# Patient Record
Sex: MALE | Race: WHITE | NOT HISPANIC OR LATINO | Employment: FULL TIME | URBAN - METROPOLITAN AREA
[De-identification: names, ages, dates, MRNs, and addresses within clinical notes are randomized per-mention and may not be internally consistent; named-entity substitution may affect disease eponyms.]

---

## 2024-08-07 ENCOUNTER — HOSPITAL ENCOUNTER (INPATIENT)
Facility: HOSPITAL | Age: 69
LOS: 8 days | Discharge: HOME | End: 2024-08-15
Attending: INTERNAL MEDICINE | Admitting: INTERNAL MEDICINE
Payer: COMMERCIAL

## 2024-08-07 DIAGNOSIS — I21.4 NSTEMI (NON-ST ELEVATED MYOCARDIAL INFARCTION): Primary | ICD-10-CM

## 2024-08-07 DIAGNOSIS — I25.10 CORONARY ARTERY DISEASE INVOLVING NATIVE HEART, UNSPECIFIED VESSEL OR LESION TYPE, UNSPECIFIED WHETHER ANGINA PRESENT: ICD-10-CM

## 2024-08-07 LAB
APTT PPP: 145.1 SECONDS (ref 60–90)
BASOPHILS # BLD AUTO: 0.04 10*3/MM3 (ref 0–0.2)
BASOPHILS NFR BLD AUTO: 0.5 % (ref 0–1.5)
CATH EF ESTIMATED: 30 %
DEPRECATED RDW RBC AUTO: 39.9 FL (ref 37–54)
EOSINOPHIL # BLD AUTO: 0.27 10*3/MM3 (ref 0–0.4)
EOSINOPHIL NFR BLD AUTO: 3.3 % (ref 0.3–6.2)
ERYTHROCYTE [DISTWIDTH] IN BLOOD BY AUTOMATED COUNT: 12.2 % (ref 12.3–15.4)
HCT VFR BLD AUTO: 38.1 % (ref 37.5–51)
HGB BLD-MCNC: 12.7 G/DL (ref 13–17.7)
IMM GRANULOCYTES # BLD AUTO: 0.03 10*3/MM3 (ref 0–0.05)
IMM GRANULOCYTES NFR BLD AUTO: 0.4 % (ref 0–0.5)
INR PPP: 1.28 (ref 0.89–1.12)
LYMPHOCYTES # BLD AUTO: 2.68 10*3/MM3 (ref 0.7–3.1)
LYMPHOCYTES NFR BLD AUTO: 32.4 % (ref 19.6–45.3)
MCH RBC QN AUTO: 30 PG (ref 26.6–33)
MCHC RBC AUTO-ENTMCNC: 33.3 G/DL (ref 31.5–35.7)
MCV RBC AUTO: 89.9 FL (ref 79–97)
MONOCYTES # BLD AUTO: 0.94 10*3/MM3 (ref 0.1–0.9)
MONOCYTES NFR BLD AUTO: 11.4 % (ref 5–12)
NEUTROPHILS NFR BLD AUTO: 4.32 10*3/MM3 (ref 1.7–7)
NEUTROPHILS NFR BLD AUTO: 52 % (ref 42.7–76)
NRBC BLD AUTO-RTO: 0 /100 WBC (ref 0–0.2)
PLATELET # BLD AUTO: 222 10*3/MM3 (ref 140–450)
PMV BLD AUTO: 11.6 FL (ref 6–12)
PROTHROMBIN TIME: 16.1 SECONDS (ref 12.2–14.5)
RBC # BLD AUTO: 4.24 10*6/MM3 (ref 4.14–5.8)
UFH PPP CHRO-ACNC: >1.1 IU/ML (ref 0.3–0.7)
WBC NRBC COR # BLD AUTO: 8.28 10*3/MM3 (ref 3.4–10.8)

## 2024-08-07 PROCEDURE — 85520 HEPARIN ASSAY: CPT | Performed by: INTERNAL MEDICINE

## 2024-08-07 PROCEDURE — 4A023N7 MEASUREMENT OF CARDIAC SAMPLING AND PRESSURE, LEFT HEART, PERCUTANEOUS APPROACH: ICD-10-PCS | Performed by: INTERNAL MEDICINE

## 2024-08-07 PROCEDURE — 25010000002 HEPARIN (PORCINE) PER 1000 UNITS: Performed by: INTERNAL MEDICINE

## 2024-08-07 PROCEDURE — 25010000002 NICARDIPINE 2.5 MG/ML SOLUTION: Performed by: INTERNAL MEDICINE

## 2024-08-07 PROCEDURE — 25010000002 MIDAZOLAM PER 1 MG: Performed by: INTERNAL MEDICINE

## 2024-08-07 PROCEDURE — 85730 THROMBOPLASTIN TIME PARTIAL: CPT | Performed by: INTERNAL MEDICINE

## 2024-08-07 PROCEDURE — 25010000002 ONDANSETRON PER 1 MG: Performed by: INTERNAL MEDICINE

## 2024-08-07 PROCEDURE — 85610 PROTHROMBIN TIME: CPT | Performed by: INTERNAL MEDICINE

## 2024-08-07 PROCEDURE — B2111ZZ FLUOROSCOPY OF MULTIPLE CORONARY ARTERIES USING LOW OSMOLAR CONTRAST: ICD-10-PCS | Performed by: INTERNAL MEDICINE

## 2024-08-07 PROCEDURE — C1769 GUIDE WIRE: HCPCS | Performed by: INTERNAL MEDICINE

## 2024-08-07 PROCEDURE — 85025 COMPLETE CBC W/AUTO DIFF WBC: CPT | Performed by: INTERNAL MEDICINE

## 2024-08-07 PROCEDURE — 25510000001 IOPAMIDOL PER 1 ML: Performed by: INTERNAL MEDICINE

## 2024-08-07 PROCEDURE — 25010000002 FENTANYL CITRATE (PF) 50 MCG/ML SOLUTION: Performed by: INTERNAL MEDICINE

## 2024-08-07 PROCEDURE — 93005 ELECTROCARDIOGRAM TRACING: CPT | Performed by: INTERNAL MEDICINE

## 2024-08-07 PROCEDURE — 99222 1ST HOSP IP/OBS MODERATE 55: CPT | Performed by: INTERNAL MEDICINE

## 2024-08-07 PROCEDURE — B2151ZZ FLUOROSCOPY OF LEFT HEART USING LOW OSMOLAR CONTRAST: ICD-10-PCS | Performed by: INTERNAL MEDICINE

## 2024-08-07 PROCEDURE — 93458 L HRT ARTERY/VENTRICLE ANGIO: CPT | Performed by: INTERNAL MEDICINE

## 2024-08-07 PROCEDURE — 25810000003 SODIUM CHLORIDE 0.9 % SOLUTION: Performed by: INTERNAL MEDICINE

## 2024-08-07 PROCEDURE — 25010000002 PHENYLEPHRINE 10 MG/ML SOLUTION: Performed by: INTERNAL MEDICINE

## 2024-08-07 PROCEDURE — 25010000002 HEPARIN (PORCINE) 25000-0.45 UT/250ML-% SOLUTION: Performed by: INTERNAL MEDICINE

## 2024-08-07 RX ORDER — HEPARIN SODIUM 10000 [USP'U]/100ML
17 INJECTION, SOLUTION INTRAVENOUS
Status: DISCONTINUED | OUTPATIENT
Start: 2024-08-07 | End: 2024-08-09

## 2024-08-07 RX ORDER — ALPRAZOLAM 0.25 MG
0.25 TABLET ORAL 3 TIMES DAILY PRN
Status: DISCONTINUED | OUTPATIENT
Start: 2024-08-07 | End: 2024-08-15 | Stop reason: HOSPADM

## 2024-08-07 RX ORDER — POLYETHYLENE GLYCOL 3350 17 G/17G
17 POWDER, FOR SOLUTION ORAL DAILY PRN
Status: DISCONTINUED | OUTPATIENT
Start: 2024-08-07 | End: 2024-08-15 | Stop reason: HOSPADM

## 2024-08-07 RX ORDER — NITROGLYCERIN 0.4 MG/1
0.4 TABLET SUBLINGUAL
Status: DISCONTINUED | OUTPATIENT
Start: 2024-08-07 | End: 2024-08-09

## 2024-08-07 RX ORDER — HEPARIN SODIUM 1000 [USP'U]/ML
25 INJECTION, SOLUTION INTRAVENOUS; SUBCUTANEOUS AS NEEDED
Status: DISCONTINUED | OUTPATIENT
Start: 2024-08-07 | End: 2024-08-07 | Stop reason: DRUGHIGH

## 2024-08-07 RX ORDER — SODIUM CHLORIDE 0.9 % (FLUSH) 0.9 %
10 SYRINGE (ML) INJECTION EVERY 12 HOURS SCHEDULED
Status: DISCONTINUED | OUTPATIENT
Start: 2024-08-07 | End: 2024-08-15 | Stop reason: HOSPADM

## 2024-08-07 RX ORDER — ONDANSETRON 2 MG/ML
INJECTION INTRAMUSCULAR; INTRAVENOUS
Status: DISCONTINUED | OUTPATIENT
Start: 2024-08-07 | End: 2024-08-07 | Stop reason: HOSPADM

## 2024-08-07 RX ORDER — BISACODYL 10 MG
10 SUPPOSITORY, RECTAL RECTAL DAILY PRN
Status: DISCONTINUED | OUTPATIENT
Start: 2024-08-07 | End: 2024-08-15 | Stop reason: HOSPADM

## 2024-08-07 RX ORDER — MIDAZOLAM HYDROCHLORIDE 1 MG/ML
INJECTION INTRAMUSCULAR; INTRAVENOUS
Status: DISCONTINUED | OUTPATIENT
Start: 2024-08-07 | End: 2024-08-07 | Stop reason: HOSPADM

## 2024-08-07 RX ORDER — HEPARIN SODIUM 1000 [USP'U]/ML
25 INJECTION, SOLUTION INTRAVENOUS; SUBCUTANEOUS AS NEEDED
Status: DISCONTINUED | OUTPATIENT
Start: 2024-08-07 | End: 2024-08-07 | Stop reason: SDUPTHER

## 2024-08-07 RX ORDER — BISACODYL 5 MG/1
5 TABLET, DELAYED RELEASE ORAL DAILY PRN
Status: DISCONTINUED | OUTPATIENT
Start: 2024-08-07 | End: 2024-08-15 | Stop reason: HOSPADM

## 2024-08-07 RX ORDER — DIPHENHYDRAMINE HYDROCHLORIDE 50 MG/ML
25 INJECTION INTRAMUSCULAR; INTRAVENOUS EVERY 6 HOURS PRN
Status: DISCONTINUED | OUTPATIENT
Start: 2024-08-07 | End: 2024-08-15 | Stop reason: HOSPADM

## 2024-08-07 RX ORDER — FENTANYL CITRATE 50 UG/ML
INJECTION, SOLUTION INTRAMUSCULAR; INTRAVENOUS
Status: DISCONTINUED | OUTPATIENT
Start: 2024-08-07 | End: 2024-08-07 | Stop reason: HOSPADM

## 2024-08-07 RX ORDER — NICARDIPINE HCL-0.9% SOD CHLOR 1 MG/10 ML
SYRINGE (ML) INTRAVENOUS
Status: DISCONTINUED | OUTPATIENT
Start: 2024-08-07 | End: 2024-08-07 | Stop reason: HOSPADM

## 2024-08-07 RX ORDER — IOPAMIDOL 755 MG/ML
INJECTION, SOLUTION INTRAVASCULAR
Status: DISCONTINUED | OUTPATIENT
Start: 2024-08-07 | End: 2024-08-07 | Stop reason: HOSPADM

## 2024-08-07 RX ORDER — AMOXICILLIN 250 MG
2 CAPSULE ORAL 2 TIMES DAILY
Status: DISCONTINUED | OUTPATIENT
Start: 2024-08-07 | End: 2024-08-09

## 2024-08-07 RX ORDER — SODIUM CHLORIDE 0.9 % (FLUSH) 0.9 %
10 SYRINGE (ML) INJECTION AS NEEDED
Status: DISCONTINUED | OUTPATIENT
Start: 2024-08-07 | End: 2024-08-15 | Stop reason: HOSPADM

## 2024-08-07 RX ORDER — CARVEDILOL 3.12 MG/1
3.12 TABLET ORAL 2 TIMES DAILY WITH MEALS
Status: DISCONTINUED | OUTPATIENT
Start: 2024-08-07 | End: 2024-08-12

## 2024-08-07 RX ORDER — ACETAMINOPHEN 325 MG/1
650 TABLET ORAL EVERY 4 HOURS PRN
Status: DISCONTINUED | OUTPATIENT
Start: 2024-08-07 | End: 2024-08-15 | Stop reason: HOSPADM

## 2024-08-07 RX ORDER — HEPARIN SODIUM 1000 [USP'U]/ML
50 INJECTION, SOLUTION INTRAVENOUS; SUBCUTANEOUS AS NEEDED
Status: DISCONTINUED | OUTPATIENT
Start: 2024-08-07 | End: 2024-08-07 | Stop reason: SDUPTHER

## 2024-08-07 RX ORDER — PHENYLEPHRINE HYDROCHLORIDE 10 MG/ML
INJECTION INTRAVENOUS
Status: DISCONTINUED | OUTPATIENT
Start: 2024-08-07 | End: 2024-08-07 | Stop reason: HOSPADM

## 2024-08-07 RX ORDER — SODIUM CHLORIDE 9 MG/ML
40 INJECTION, SOLUTION INTRAVENOUS AS NEEDED
Status: DISCONTINUED | OUTPATIENT
Start: 2024-08-07 | End: 2024-08-15 | Stop reason: HOSPADM

## 2024-08-07 RX ORDER — FAMOTIDINE 10 MG/ML
INJECTION, SOLUTION INTRAVENOUS
Status: DISCONTINUED | OUTPATIENT
Start: 2024-08-07 | End: 2024-08-07 | Stop reason: HOSPADM

## 2024-08-07 RX ORDER — LIDOCAINE HYDROCHLORIDE 10 MG/ML
INJECTION, SOLUTION EPIDURAL; INFILTRATION; INTRACAUDAL; PERINEURAL
Status: DISCONTINUED | OUTPATIENT
Start: 2024-08-07 | End: 2024-08-07 | Stop reason: HOSPADM

## 2024-08-07 RX ORDER — ASPIRIN 81 MG/1
81 TABLET ORAL DAILY
Status: DISCONTINUED | OUTPATIENT
Start: 2024-08-08 | End: 2024-08-10

## 2024-08-07 RX ORDER — HEPARIN SODIUM 10000 [USP'U]/100ML
12 INJECTION, SOLUTION INTRAVENOUS
Status: DISCONTINUED | OUTPATIENT
Start: 2024-08-07 | End: 2024-08-07 | Stop reason: SDUPTHER

## 2024-08-07 RX ORDER — ROSUVASTATIN CALCIUM 20 MG/1
20 TABLET, COATED ORAL NIGHTLY
Status: DISCONTINUED | OUTPATIENT
Start: 2024-08-07 | End: 2024-08-15 | Stop reason: HOSPADM

## 2024-08-07 RX ORDER — HEPARIN SODIUM 1000 [USP'U]/ML
50 INJECTION, SOLUTION INTRAVENOUS; SUBCUTANEOUS AS NEEDED
Status: DISCONTINUED | OUTPATIENT
Start: 2024-08-07 | End: 2024-08-07 | Stop reason: DRUGHIGH

## 2024-08-07 RX ORDER — HEPARIN SODIUM 1000 [USP'U]/ML
INJECTION, SOLUTION INTRAVENOUS; SUBCUTANEOUS
Status: DISCONTINUED | OUTPATIENT
Start: 2024-08-07 | End: 2024-08-07 | Stop reason: HOSPADM

## 2024-08-07 RX ADMIN — CARVEDILOL 3.12 MG: 3.12 TABLET, FILM COATED ORAL at 21:42

## 2024-08-07 RX ADMIN — Medication 10 ML: at 21:47

## 2024-08-07 RX ADMIN — HEPARIN SODIUM 12 UNITS/KG/HR: 10000 INJECTION, SOLUTION INTRAVENOUS at 21:43

## 2024-08-07 RX ADMIN — ROSUVASTATIN CALCIUM 20 MG: 20 TABLET, COATED ORAL at 21:42

## 2024-08-07 NOTE — H&P
[facility] at [facility]   Consult Note    Referring Provider: [facility] emergency room  Edgard Romero MD    Reason for Consultation: Field STEMI    Patient Care Team:  Edgard Romero MD as PCP - General (Family Medicine)    No past medical history on file.    No past surgical history on file.    Problem list:  Coronary artery disease.    COPD.  Remote tobacco use  Not on File        Current Facility-Administered Medications:     famotidine (PEPCID) injection, , , Code / Trauma / Sedation Medication, Logan Calix MD, 20 mg at 08/07/24 1946    fentaNYL citrate (PF) (SUBLIMAZE) injection, , , Code / Trauma / Sedation Medication, Logan Calix MD, 50 mcg at 08/07/24 1947    heparin (porcine) injection, , , Code / Trauma / Sedation Medication, Logan Calix MD, 5,000 Units at 08/07/24 1948    iopamidol (ISOVUE-370) 76 % injection, , , Code / Trauma / Sedation Medication, Logan Calix MD, 75 mL at 08/1957    lidocaine PF 1% (XYLOCAINE) injection, , , Code / Trauma / Sedation Medication, Logan Calix MD, 5 mL at 08/07/24 1947    midazolam (VERSED) injection, , , Code / Trauma / Sedation Medication, Logan Calix MD, 2 mg at 08/07/24 1947    niCARdipine HCl in NaCl (CARDENE) 1-0.9 MG/10ML-% syringe solution prefilled syringe, , , Code / Trauma / Sedation Medication, Logan Calix MD, 200 mcg at 08/07/24 1948    nitroglycerin 100 mcg/mL in D5W syringe, , , Code / Trauma / Sedation Medication, Logan Calix MD, 200 mcg at 08/07/24 1948    ondansetron (ZOFRAN) injection, , , Code / Trauma / Sedation Medication, Logan Calix MD, 4 mg at 08/07/24 1946    phenylephrine (DIANA-SYNEPHRINE) injection, , , Code / Trauma / Sedation Medication, Logan Calix MD, 200 mcg at 08/07/24 1951         No medications prior to admission.     Medicines prior to mission: Inhaler for COPD.  Otherwise nothing    Subjective .   History of present illness: 68-year-old gentleman  "history of remote tobacco use positive family coronary disease but otherwise no significant history.  In usual state of health until proxy 1 week ago.  He had a prolonged episode of \"feeling bad\", last Friday.  Since that time he notes progressive dyspnea on exertion.  Being short of breath with normal daily activities.  Functional class III level, 1 baseline 1 month ago was functional class I.  Due to this he saw his primary care physician, was diagnosed with a STEMI.  Troponins were positive, and he was sent for urgent cath/PCI.  He arrives chest pain-free.  He did receive Plavix and heparin en route.      Social History     Socioeconomic History    Marital status:    Social history.  Works as a .  .  Quit smoking 14 years ago.  No family history on file.    Family history positive for premature coronary disease in brother.    Review of Systems:  Review of Systems   Constitutional: Negative.   Eyes:  Positive for blurred vision.   Cardiovascular:  Positive for chest pain and dyspnea on exertion. Negative for leg swelling, palpitations and syncope.   Respiratory: Negative.  Negative for shortness of breath.    Hematologic/Lymphatic: Negative for bleeding problem. Does not bruise/bleed easily.   Skin:  Negative for rash.   Musculoskeletal:  Positive for back pain. Negative for muscle weakness and myalgias.   Gastrointestinal:  Negative for heartburn, nausea and vomiting.   Neurological:  Negative for dizziness, light-headedness, loss of balance and numbness.              Objective   Vitals:  /85   Pulse 98   Resp 16   Ht 172.7 cm (67.99\")   Wt 82.5 kg (181 lb 14.1 oz)   SpO2 96%   BMI 27.66 kg/m²    No intake or output data in the 24 hours ending 08/07/24 1958    Vitals reviewed.   Constitutional:       Appearance: Well-developed and not in distress.   Neck:      Thyroid: No thyromegaly.      Vascular: No carotid bruit or JVD.   Pulmonary:      Breath sounds: Normal breath " "sounds.   Cardiovascular:      Regular rhythm.      Murmurs: There is a grade 2/6 systolic murmur at the URSB and ULSB.      No gallop. No S3 and S4 gallop.   Pulses:     Intact distal pulses.      Carotid: 2+ bilaterally.     Radial: 2+ bilaterally.  Edema:     Peripheral edema absent.   Abdominal:      General: Bowel sounds are normal.      Palpations: Abdomen is soft. There is no abdominal mass.      Tenderness: There is no abdominal tenderness.   Musculoskeletal:         General: No deformity.      Extremities: No clubbing present.Skin:     General: Skin is warm and dry.      Findings: No rash.   Neurological:      Mental Status: Alert and oriented to person, place, and time.              Results Review:  I reviewed the patient's new clinical results.            Invalid input(s): \"LABALBU\", \"PROT\"          No results found for: \"TROPONINT\"                    Tele: Sinus rhythm    EKG: Sinus rhythm.  1 to 2 mm of ST elevation in lead II only.  1 to 2 mm ST depression in the anterior precordial leads.      Assessment & Plan     ACS.  Dynamic EKG changes.  New functional class III-4 angina.  Positive troponin consistent with non-STEMI.  Remote tobacco use resolved 14 years ago  COPD  Unknown lipid status      Plan:    Urgent left heart catheterization plus minus PCI.  2.  Initiate aspirin, high intensity statin.  Emergent left heart cath showed multivessel disease, decreased LV function and evidence of at least moderate if not severe aortic stenosis.  Will get CT surgical consult for AVR/three-vessel CABG.    Logan Calix MD        Dictated utilizing Dragon dictation  "

## 2024-08-08 ENCOUNTER — APPOINTMENT (OUTPATIENT)
Dept: CARDIOLOGY | Facility: HOSPITAL | Age: 69
End: 2024-08-08
Payer: COMMERCIAL

## 2024-08-08 ENCOUNTER — APPOINTMENT (OUTPATIENT)
Dept: PULMONOLOGY | Facility: HOSPITAL | Age: 69
End: 2024-08-08
Payer: COMMERCIAL

## 2024-08-08 ENCOUNTER — ANESTHESIA EVENT (OUTPATIENT)
Dept: PERIOP | Facility: HOSPITAL | Age: 69
End: 2024-08-08
Payer: COMMERCIAL

## 2024-08-08 PROBLEM — I25.10 CORONARY ARTERY DISEASE INVOLVING NATIVE HEART: Status: ACTIVE | Noted: 2024-08-07

## 2024-08-08 LAB
ABO GROUP BLD: NORMAL
ABO GROUP BLD: NORMAL
ANION GAP SERPL CALCULATED.3IONS-SCNC: 10 MMOL/L (ref 5–15)
BASOPHILS # BLD AUTO: 0.05 10*3/MM3 (ref 0–0.2)
BASOPHILS NFR BLD AUTO: 0.7 % (ref 0–1.5)
BH CV ECHO MEAS - AO MAX PG: 67.4 MMHG
BH CV ECHO MEAS - AO MEAN PG: 41.5 MMHG
BH CV ECHO MEAS - AO ROOT DIAM: 3.5 CM
BH CV ECHO MEAS - AO V2 MAX: 410.5 CM/SEC
BH CV ECHO MEAS - AO V2 VTI: 86.3 CM
BH CV ECHO MEAS - AVA(I,D): 0.47 CM2
BH CV ECHO MEAS - EDV(CUBED): 157.5 ML
BH CV ECHO MEAS - EDV(MOD-SP2): 144 ML
BH CV ECHO MEAS - EDV(MOD-SP4): 144 ML
BH CV ECHO MEAS - EF(MOD-BP): 31.8 %
BH CV ECHO MEAS - EF(MOD-SP2): 33.1 %
BH CV ECHO MEAS - EF(MOD-SP4): 27.1 %
BH CV ECHO MEAS - ESV(CUBED): 54.9 ML
BH CV ECHO MEAS - ESV(MOD-SP2): 96.3 ML
BH CV ECHO MEAS - ESV(MOD-SP4): 105 ML
BH CV ECHO MEAS - FS: 29.6 %
BH CV ECHO MEAS - IVS/LVPW: 1 CM
BH CV ECHO MEAS - IVSD: 1.1 CM
BH CV ECHO MEAS - LA DIMENSION: 3.9 CM
BH CV ECHO MEAS - LAT PEAK E' VEL: 8.5 CM/SEC
BH CV ECHO MEAS - LV DIASTOLIC VOL/BSA (35-75): 75.9 CM2
BH CV ECHO MEAS - LV MASS(C)D: 234.8 GRAMS
BH CV ECHO MEAS - LV MAX PG: 1.8 MMHG
BH CV ECHO MEAS - LV MEAN PG: 1 MMHG
BH CV ECHO MEAS - LV SYSTOLIC VOL/BSA (12-30): 55.3 CM2
BH CV ECHO MEAS - LV V1 MAX: 67.2 CM/SEC
BH CV ECHO MEAS - LV V1 VTI: 12.9 CM
BH CV ECHO MEAS - LVIDD: 5.4 CM
BH CV ECHO MEAS - LVIDS: 3.8 CM
BH CV ECHO MEAS - LVOT AREA: 3.1 CM2
BH CV ECHO MEAS - LVOT DIAM: 2 CM
BH CV ECHO MEAS - LVPWD: 1.1 CM
BH CV ECHO MEAS - MED PEAK E' VEL: 7.7 CM/SEC
BH CV ECHO MEAS - MV A MAX VEL: 87 CM/SEC
BH CV ECHO MEAS - MV DEC SLOPE: 477 CM/SEC2
BH CV ECHO MEAS - MV DEC TIME: 0.21 SEC
BH CV ECHO MEAS - MV E MAX VEL: 81.2 CM/SEC
BH CV ECHO MEAS - MV E/A: 0.93
BH CV ECHO MEAS - MV MAX PG: 4.3 MMHG
BH CV ECHO MEAS - MV MEAN PG: 3 MMHG
BH CV ECHO MEAS - MV P1/2T: 60.5 MSEC
BH CV ECHO MEAS - MV V2 VTI: 22 CM
BH CV ECHO MEAS - MVA(P1/2T): 3.6 CM2
BH CV ECHO MEAS - MVA(VTI): 1.83 CM2
BH CV ECHO MEAS - PA ACC TIME: 0.08 SEC
BH CV ECHO MEAS - PI END-D VEL: 126 CM/SEC
BH CV ECHO MEAS - RAP SYSTOLE: 3 MMHG
BH CV ECHO MEAS - RVSP: 21 MMHG
BH CV ECHO MEAS - SV(LVOT): 40.4 ML
BH CV ECHO MEAS - SV(MOD-SP2): 47.7 ML
BH CV ECHO MEAS - SV(MOD-SP4): 39 ML
BH CV ECHO MEAS - SVI(LVOT): 21.3 ML/M2
BH CV ECHO MEAS - SVI(MOD-SP2): 25.1 ML/M2
BH CV ECHO MEAS - SVI(MOD-SP4): 20.5 ML/M2
BH CV ECHO MEAS - TAPSE (>1.6): 2.19 CM
BH CV ECHO MEAS - TR MAX PG: 17.6 MMHG
BH CV ECHO MEAS - TR MAX VEL: 208.5 CM/SEC
BH CV ECHO MEASUREMENTS AVERAGE E/E' RATIO: 10.02
BH CV VAS BP RIGHT ARM: NORMAL MMHG
BH CV VAS PRELIMINARY FINDINGS SCRIPTING: 1
BH CV XLRA - RV BASE: 3.7 CM
BH CV XLRA - RV LENGTH: 8.5 CM
BH CV XLRA - RV MID: 2.6 CM
BH CV XLRA - TDI S': 11.9 CM/SEC
BH CV XLRA MEAS LEFT DIST CCA EDV: 19 CM/SEC
BH CV XLRA MEAS LEFT DIST CCA PSV: 59.3 CM/SEC
BH CV XLRA MEAS LEFT DIST ICA EDV: 27 CM/SEC
BH CV XLRA MEAS LEFT DIST ICA PSV: 89.4 CM/SEC
BH CV XLRA MEAS LEFT ICA/CCA RATIO: 6.13
BH CV XLRA MEAS LEFT MID CCA EDV: 19 CM/SEC
BH CV XLRA MEAS LEFT MID CCA PSV: 64.8 CM/SEC
BH CV XLRA MEAS LEFT MID ICA EDV: 116.3 CM/SEC
BH CV XLRA MEAS LEFT MID ICA PSV: 269.3 CM/SEC
BH CV XLRA MEAS LEFT PROX CCA EDV: 26.3 CM/SEC
BH CV XLRA MEAS LEFT PROX CCA PSV: 79.2 CM/SEC
BH CV XLRA MEAS LEFT PROX ECA PSV: 171.4 CM/SEC
BH CV XLRA MEAS LEFT PROX ICA EDV: 176.1 CM/SEC
BH CV XLRA MEAS LEFT PROX ICA PSV: 397.5 CM/SEC
BH CV XLRA MEAS LEFT PROX SCLA PSV: 154.23 CM/SEC
BH CV XLRA MEAS LEFT VERTEBRAL A EDV: 28 CM/SEC
BH CV XLRA MEAS LEFT VERTEBRAL A PSV: 80.7 CM/SEC
BH CV XLRA MEAS RIGHT DIST CCA EDV: 14.7 CM/SEC
BH CV XLRA MEAS RIGHT DIST CCA PSV: 41.7 CM/SEC
BH CV XLRA MEAS RIGHT DIST ICA EDV: 34.2 CM/SEC
BH CV XLRA MEAS RIGHT DIST ICA PSV: 83.4 CM/SEC
BH CV XLRA MEAS RIGHT ICA/CCA RATIO: 10.81
BH CV XLRA MEAS RIGHT MID CCA EDV: 17.2 CM/SEC
BH CV XLRA MEAS RIGHT MID CCA PSV: 49.2 CM/SEC
BH CV XLRA MEAS RIGHT MID ICA EDV: 163.8 CM/SEC
BH CV XLRA MEAS RIGHT MID ICA PSV: 390.3 CM/SEC
BH CV XLRA MEAS RIGHT PROX CCA EDV: 17.9 CM/SEC
BH CV XLRA MEAS RIGHT PROX CCA PSV: 63.2 CM/SEC
BH CV XLRA MEAS RIGHT PROX ECA PSV: 105.5 CM/SEC
BH CV XLRA MEAS RIGHT PROX ICA EDV: 265.8 CM/SEC
BH CV XLRA MEAS RIGHT PROX ICA PSV: 531.7 CM/SEC
BH CV XLRA MEAS RIGHT PROX SCLA PSV: 82.51 CM/SEC
BH CV XLRA MEAS RIGHT VERTEBRAL A EDV: 28.6 CM/SEC
BH CV XLRA MEAS RIGHT VERTEBRAL A PSV: 93.4 CM/SEC
BLD GP AB SCN SERPL QL: NEGATIVE
BUN SERPL-MCNC: 28 MG/DL (ref 8–23)
BUN/CREAT SERPL: 23.5 (ref 7–25)
CALCIUM SPEC-SCNC: 8.8 MG/DL (ref 8.6–10.5)
CHLORIDE SERPL-SCNC: 105 MMOL/L (ref 98–107)
CHOLEST SERPL-MCNC: 205 MG/DL (ref 0–200)
CO2 SERPL-SCNC: 22 MMOL/L (ref 22–29)
CREAT SERPL-MCNC: 1.19 MG/DL (ref 0.76–1.27)
DEPRECATED RDW RBC AUTO: 39.8 FL (ref 37–54)
DEPRECATED RDW RBC AUTO: 40.1 FL (ref 37–54)
EGFRCR SERPLBLD CKD-EPI 2021: 67.4 ML/MIN/1.73
EOSINOPHIL # BLD AUTO: 0.33 10*3/MM3 (ref 0–0.4)
EOSINOPHIL NFR BLD AUTO: 4.8 % (ref 0.3–6.2)
ERYTHROCYTE [DISTWIDTH] IN BLOOD BY AUTOMATED COUNT: 12.1 % (ref 12.3–15.4)
ERYTHROCYTE [DISTWIDTH] IN BLOOD BY AUTOMATED COUNT: 12.1 % (ref 12.3–15.4)
GEN 5 2HR TROPONIN T REFLEX: 827 NG/L
GLUCOSE BLDC GLUCOMTR-MCNC: 104 MG/DL (ref 70–130)
GLUCOSE BLDC GLUCOMTR-MCNC: 98 MG/DL (ref 70–130)
GLUCOSE SERPL-MCNC: 102 MG/DL (ref 65–99)
HBA1C MFR BLD: 5.6 % (ref 4.8–5.6)
HCT VFR BLD AUTO: 37.5 % (ref 37.5–51)
HCT VFR BLD AUTO: 39.4 % (ref 37.5–51)
HDLC SERPL-MCNC: 33 MG/DL (ref 40–60)
HGB BLD-MCNC: 12.6 G/DL (ref 13–17.7)
HGB BLD-MCNC: 13.2 G/DL (ref 13–17.7)
IMM GRANULOCYTES # BLD AUTO: 0.02 10*3/MM3 (ref 0–0.05)
IMM GRANULOCYTES NFR BLD AUTO: 0.3 % (ref 0–0.5)
LDLC SERPL CALC-MCNC: 142 MG/DL (ref 0–100)
LDLC/HDLC SERPL: 4.22 {RATIO}
LEFT ARM BP: NORMAL MMHG
LEFT ATRIUM VOLUME INDEX: 30.2 ML/M2
LV EF 2D ECHO EST: 35 %
LYMPHOCYTES # BLD AUTO: 2.12 10*3/MM3 (ref 0.7–3.1)
LYMPHOCYTES NFR BLD AUTO: 30.9 % (ref 19.6–45.3)
MCH RBC QN AUTO: 30.1 PG (ref 26.6–33)
MCH RBC QN AUTO: 30.1 PG (ref 26.6–33)
MCHC RBC AUTO-ENTMCNC: 33.5 G/DL (ref 31.5–35.7)
MCHC RBC AUTO-ENTMCNC: 33.6 G/DL (ref 31.5–35.7)
MCV RBC AUTO: 89.7 FL (ref 79–97)
MCV RBC AUTO: 90 FL (ref 79–97)
MONOCYTES # BLD AUTO: 0.83 10*3/MM3 (ref 0.1–0.9)
MONOCYTES NFR BLD AUTO: 12.1 % (ref 5–12)
NEUTROPHILS NFR BLD AUTO: 3.5 10*3/MM3 (ref 1.7–7)
NEUTROPHILS NFR BLD AUTO: 51.2 % (ref 42.7–76)
NRBC BLD AUTO-RTO: 0 /100 WBC (ref 0–0.2)
PA ADP PRP-ACNC: 247 PRU
PLATELET # BLD AUTO: 195 10*3/MM3 (ref 140–450)
PLATELET # BLD AUTO: 252 10*3/MM3 (ref 140–450)
PMV BLD AUTO: 11.4 FL (ref 6–12)
PMV BLD AUTO: 11.7 FL (ref 6–12)
POTASSIUM SERPL-SCNC: 4 MMOL/L (ref 3.5–5.2)
RBC # BLD AUTO: 4.18 10*6/MM3 (ref 4.14–5.8)
RBC # BLD AUTO: 4.38 10*6/MM3 (ref 4.14–5.8)
RH BLD: POSITIVE
RH BLD: POSITIVE
SODIUM SERPL-SCNC: 137 MMOL/L (ref 136–145)
T&S EXPIRATION DATE: NORMAL
T4 FREE SERPL-MCNC: 1.64 NG/DL (ref 0.92–1.68)
TRIGL SERPL-MCNC: 163 MG/DL (ref 0–150)
TROPONIN T DELTA: -23 NG/L
TROPONIN T SERPL HS-MCNC: 850 NG/L
TSH SERPL DL<=0.05 MIU/L-ACNC: 4.23 UIU/ML (ref 0.27–4.2)
UFH PPP CHRO-ACNC: 0.1 IU/ML (ref 0.3–0.7)
UFH PPP CHRO-ACNC: 0.19 IU/ML (ref 0.3–0.7)
UFH PPP CHRO-ACNC: 0.31 IU/ML (ref 0.3–0.7)
UFH PPP CHRO-ACNC: 0.4 IU/ML (ref 0.3–0.7)
VLDLC SERPL-MCNC: 30 MG/DL (ref 5–40)
WBC NRBC COR # BLD AUTO: 6.85 10*3/MM3 (ref 3.4–10.8)
WBC NRBC COR # BLD AUTO: 7.94 10*3/MM3 (ref 3.4–10.8)

## 2024-08-08 PROCEDURE — 86901 BLOOD TYPING SEROLOGIC RH(D): CPT

## 2024-08-08 PROCEDURE — 84439 ASSAY OF FREE THYROXINE: CPT | Performed by: NURSE PRACTITIONER

## 2024-08-08 PROCEDURE — 93306 TTE W/DOPPLER COMPLETE: CPT

## 2024-08-08 PROCEDURE — 93880 EXTRACRANIAL BILAT STUDY: CPT | Performed by: INTERNAL MEDICINE

## 2024-08-08 PROCEDURE — 84484 ASSAY OF TROPONIN QUANT: CPT | Performed by: INTERNAL MEDICINE

## 2024-08-08 PROCEDURE — 25010000002 FUROSEMIDE PER 20 MG: Performed by: NURSE PRACTITIONER

## 2024-08-08 PROCEDURE — 25010000002 SULFUR HEXAFLUORIDE MICROSPH 60.7-25 MG RECONSTITUTED SUSPENSION: Performed by: INTERNAL MEDICINE

## 2024-08-08 PROCEDURE — 86900 BLOOD TYPING SEROLOGIC ABO: CPT

## 2024-08-08 PROCEDURE — 85520 HEPARIN ASSAY: CPT

## 2024-08-08 PROCEDURE — 99232 SBSQ HOSP IP/OBS MODERATE 35: CPT | Performed by: NURSE PRACTITIONER

## 2024-08-08 PROCEDURE — 80048 BASIC METABOLIC PNL TOTAL CA: CPT | Performed by: INTERNAL MEDICINE

## 2024-08-08 PROCEDURE — 83036 HEMOGLOBIN GLYCOSYLATED A1C: CPT | Performed by: INTERNAL MEDICINE

## 2024-08-08 PROCEDURE — 94010 BREATHING CAPACITY TEST: CPT

## 2024-08-08 PROCEDURE — 93880 EXTRACRANIAL BILAT STUDY: CPT

## 2024-08-08 PROCEDURE — 25010000002 HEPARIN (PORCINE) 25000-0.45 UT/250ML-% SOLUTION

## 2024-08-08 PROCEDURE — 86923 COMPATIBILITY TEST ELECTRIC: CPT

## 2024-08-08 PROCEDURE — 85027 COMPLETE CBC AUTOMATED: CPT | Performed by: INTERNAL MEDICINE

## 2024-08-08 PROCEDURE — 85025 COMPLETE CBC W/AUTO DIFF WBC: CPT | Performed by: INTERNAL MEDICINE

## 2024-08-08 PROCEDURE — 80061 LIPID PANEL: CPT | Performed by: INTERNAL MEDICINE

## 2024-08-08 PROCEDURE — 85576 BLOOD PLATELET AGGREGATION: CPT | Performed by: PHYSICIAN ASSISTANT

## 2024-08-08 PROCEDURE — 25010000002 HEPARIN (PORCINE) PER 1000 UNITS

## 2024-08-08 PROCEDURE — 93306 TTE W/DOPPLER COMPLETE: CPT | Performed by: INTERNAL MEDICINE

## 2024-08-08 PROCEDURE — 82948 REAGENT STRIP/BLOOD GLUCOSE: CPT

## 2024-08-08 PROCEDURE — 84443 ASSAY THYROID STIM HORMONE: CPT | Performed by: INTERNAL MEDICINE

## 2024-08-08 PROCEDURE — 86850 RBC ANTIBODY SCREEN: CPT

## 2024-08-08 PROCEDURE — 93005 ELECTROCARDIOGRAM TRACING: CPT | Performed by: INTERNAL MEDICINE

## 2024-08-08 RX ORDER — SODIUM CHLORIDE 9 MG/ML
40 INJECTION, SOLUTION INTRAVENOUS AS NEEDED
Status: CANCELLED | OUTPATIENT
Start: 2024-08-08

## 2024-08-08 RX ORDER — FAMOTIDINE 10 MG/ML
20 INJECTION, SOLUTION INTRAVENOUS ONCE
Status: DISCONTINUED | OUTPATIENT
Start: 2024-08-08 | End: 2024-08-08

## 2024-08-08 RX ORDER — CHLORHEXIDINE GLUCONATE ORAL RINSE 1.2 MG/ML
15 SOLUTION DENTAL EVERY 12 HOURS
Status: COMPLETED | OUTPATIENT
Start: 2024-08-08 | End: 2024-08-09

## 2024-08-08 RX ORDER — HEPARIN SODIUM 1000 [USP'U]/ML
1900 INJECTION, SOLUTION INTRAVENOUS; SUBCUTANEOUS ONCE
Status: COMPLETED | OUTPATIENT
Start: 2024-08-08 | End: 2024-08-08

## 2024-08-08 RX ORDER — SODIUM CHLORIDE, SODIUM LACTATE, POTASSIUM CHLORIDE, CALCIUM CHLORIDE 600; 310; 30; 20 MG/100ML; MG/100ML; MG/100ML; MG/100ML
9 INJECTION, SOLUTION INTRAVENOUS CONTINUOUS
Status: CANCELLED | OUTPATIENT
Start: 2024-08-08

## 2024-08-08 RX ORDER — CHLORHEXIDINE GLUCONATE 500 MG/1
CLOTH TOPICAL EVERY 12 HOURS PRN
Status: DISCONTINUED | OUTPATIENT
Start: 2024-08-08 | End: 2024-08-13

## 2024-08-08 RX ORDER — FAMOTIDINE 20 MG/1
20 TABLET, FILM COATED ORAL ONCE
Status: DISCONTINUED | OUTPATIENT
Start: 2024-08-08 | End: 2024-08-08

## 2024-08-08 RX ORDER — FUROSEMIDE 10 MG/ML
20 INJECTION INTRAMUSCULAR; INTRAVENOUS ONCE
Status: COMPLETED | OUTPATIENT
Start: 2024-08-08 | End: 2024-08-08

## 2024-08-08 RX ORDER — IPRATROPIUM BROMIDE AND ALBUTEROL SULFATE 2.5; .5 MG/3ML; MG/3ML
3 SOLUTION RESPIRATORY (INHALATION) EVERY 4 HOURS PRN
Status: DISCONTINUED | OUTPATIENT
Start: 2024-08-08 | End: 2024-08-11 | Stop reason: SDUPTHER

## 2024-08-08 RX ORDER — FAMOTIDINE 10 MG/ML
20 INJECTION, SOLUTION INTRAVENOUS ONCE
Status: CANCELLED | OUTPATIENT
Start: 2024-08-08

## 2024-08-08 RX ORDER — HEPARIN SODIUM 1000 [USP'U]/ML
3800 INJECTION, SOLUTION INTRAVENOUS; SUBCUTANEOUS ONCE
Status: COMPLETED | OUTPATIENT
Start: 2024-08-08 | End: 2024-08-08

## 2024-08-08 RX ORDER — SODIUM CHLORIDE 0.9 % (FLUSH) 0.9 %
10 SYRINGE (ML) INJECTION AS NEEDED
Status: CANCELLED | OUTPATIENT
Start: 2024-08-08

## 2024-08-08 RX ADMIN — ACETAMINOPHEN 650 MG: 325 TABLET ORAL at 12:01

## 2024-08-08 RX ADMIN — ROSUVASTATIN CALCIUM 20 MG: 20 TABLET, COATED ORAL at 21:13

## 2024-08-08 RX ADMIN — FUROSEMIDE 20 MG: 10 INJECTION, SOLUTION INTRAMUSCULAR; INTRAVENOUS at 12:01

## 2024-08-08 RX ADMIN — 0.12% CHLORHEXIDINE GLUCONATE 15 ML: 1.2 RINSE ORAL at 17:36

## 2024-08-08 RX ADMIN — ASPIRIN 81 MG: 81 TABLET, COATED ORAL at 08:09

## 2024-08-08 RX ADMIN — Medication 10 ML: at 21:14

## 2024-08-08 RX ADMIN — MUPIROCIN 1 APPLICATION: 20 OINTMENT TOPICAL at 18:23

## 2024-08-08 RX ADMIN — Medication 10 ML: at 08:10

## 2024-08-08 RX ADMIN — SULFUR HEXAFLUORIDE 2 ML: KIT at 15:19

## 2024-08-08 RX ADMIN — HEPARIN SODIUM 17 UNITS/KG/HR: 10000 INJECTION, SOLUTION INTRAVENOUS at 19:27

## 2024-08-08 RX ADMIN — HEPARIN SODIUM 3800 UNITS: 1000 INJECTION INTRAVENOUS; SUBCUTANEOUS at 05:19

## 2024-08-08 RX ADMIN — HEPARIN SODIUM 1900 UNITS: 1000 INJECTION INTRAVENOUS; SUBCUTANEOUS at 17:38

## 2024-08-08 NOTE — PROGRESS NOTES
HEPARIN INFUSION  Preet Edwards is a  68 y.o. male receiving heparin infusion.     Therapy for (VTE/Cardiac):   cardiac  Patient Weight: 76.4 kg  Initial Bolus (Y/N):   n (5000 units in cath lab)  Any Bolus (Y/N):   y        Signs or Symptoms of Bleeding: none noted    Cardiac or Other (Not VTE)   Initial Bolus: 60 units/kg (Max 4,000 units)  Initial rate: 12 units/kg/hr (Max 1,000 units/hr)   Anti Xa Rebolus Infusion Hold time Change infusion Dose (Units/kg/hr) Next Anti Xa or aPTT Level Due   < 0.11 50 Units/kg  (4000 Units Max) None Increase by  3 Units/kg/hr 6 hours   0.11- 0.19 25 Units/kg  (2000 Units Max) None Increase by  2 Units/kg/hr 6 hours   0.2 - 0.29 0 None Increase by  1 Units/kg/hr 6 hours   0.3 - 0.5 0 None No Change 6 hours (after 2 consecutive levels in range check qAM)   0.51 - 0.6 0 None Decrease by  1 Units/kg/hr 6 hours   0.61 - 0.8 0 30 Minutes Decrease by  2 Units/kg/hr 6 hours   0.81 - 1 0 60 Minutes Decrease by  3 Units/kg/hr 6 hours   >1 0 Hold  After Anti Xa less than 0.5 decrease previous rate by  4 Units/kg/hr  Every 2 hours until Anti Xa  less than 0.5 then when infusion restarts in 6 hours     Recommend Xa every 6 hours.     Results from last 7 days   Lab Units 08/08/24  0826 08/08/24  0325 08/07/24 2034   INR   --   --  1.28*   HEMOGLOBIN g/dL 13.2 12.6* 12.7*   HEMATOCRIT % 39.4 37.5 38.1   PLATELETS 10*3/mm3 252 195 222          Date   Time   Anti-Xa Current Rate (Unit/kg/hr) Bolus   (Units) Rate Change   (Unit/kg/hr) New Rate (Unit/kg/hr) Next   Anti-Xa Comments  Pump Check Daily   8/7 2100 STAT -- 5000 in cath lab 12 12 0400 D/w RN   8/8 0515 0.1 12 3800 +3 15 1200 D/w RN, confirmed drip has been running   8/8 0951 0.4 15 -- -- 15 1600 Nik 3245                                                                                                                                                                                                           Anthony Parham,  RPH  8/8/2024  11:48 EDT

## 2024-08-08 NOTE — CONSULTS
CTS Consult    Patient Care Team:  Edgard Romero MD as PCP - General (Family Medicine)      Reason for Consult:  STEMI, Aoortic stenosis    HPI  Patient is a 68 y.o. male presents with angina.. Onset of symptoms was abrupt starting 10 minutes ago.  Symptoms are associated with n/a .  Symptoms are aggravated by n/a.   Symptoms improve with n/a. Severity 10/10 Context n/a Quality sharp    Workup has revealed none. Cardiac catheterization demonstrated three vessel disease and aortic stenosis with a valve area of not calculated centimeters squared and a gradient of 40 mm Hg. Cardiac risk factors include smoking/ tobacco exposure.      Review of Systems    A comprehensive review of systems was negative except for:   Constitutional: positive for nl  Respiratory: positive for nl  Cardiovascular: positive for  nl  Gastrointestinal: positive for nl  Hematologic/lymphatic: positive for  nl  Musculoskeletal: positive for  nl  Neurological: positive for  nl  Allergic/Immunologic: positive for  nl    History  Past Medical History:   Diagnosis Date    COPD (chronic obstructive pulmonary disease)     GERD (gastroesophageal reflux disease)      Past Surgical History:   Procedure Laterality Date    CATARACT EXTRACTION       History reviewed. No pertinent family history.  Social History     Tobacco Use    Smoking status: Former     Current packs/day: 0.00     Types: Cigarettes     Quit date:      Years since quittin.6    Smokeless tobacco: Never   Vaping Use    Vaping status: Former   Substance Use Topics    Alcohol use: Never    Drug use: Not Currently     No medications prior to admission.     Allergies:  Patient has no known allergies.    Objective    Vital Signs  Temp:  [97.5 °F (36.4 °C)-98.3 °F (36.8 °C)] 98.3 °F (36.8 °C)  Heart Rate:  [88-98] 88  Resp:  [16-18] 18  BP: ()/(62-85) 99/62    Physical Exam:  General Appearance:   Head: normocephalic, without obvious abnormality and atraumatic  Throat: no  "oral lesions, no thrush, and oral mucosa moist  Neck: no adenopathy, supple, trachea midline, no thyromegaly, no carotid bruit, and no JVD  Lungs: clear to auscultation, respirations regular, respirations even, and respirations unlabored  Heart: regular rhythm & normal rate, normal S1, S2, no murmur, no gallop, no rub, and no click  Abdomen: normal bowel sounds, no masses, no hepatomegaly, no splenomegaly, soft non-tender, no guarding, and no rebound tenderness  Extremities: moves extremities well, no edema, no cyanosis, and no redness  Pulses:   Skin: no bleeding, bruising or rash  Neurologic: Mental Status orientated to person, place, time and situation          Data Review:  Results from last 7 days   Lab Units 08/08/24  0325   WBC 10*3/mm3 6.85   HEMOGLOBIN g/dL 12.6*   HEMATOCRIT % 37.5   PLATELETS 10*3/mm3 195     Results from last 7 days   Lab Units 08/08/24  0325   SODIUM mmol/L 137   POTASSIUM mmol/L 4.0   CHLORIDE mmol/L 105   CO2 mmol/L 22.0   BUN mg/dL 28*   CREATININE mg/dL 1.19   GLUCOSE mg/dL 102*   CALCIUM mg/dL 8.8     Coagulation:   INR   Date Value Ref Range Status   08/07/2024 1.28 (H) 0.89 - 1.12 Final     Cardiac markers:     ABGs:       Invalid input(s): \"PO2\"        Cardiac catheterization demonstrated multivessel disease and aortic stenosis with a valve area of not calculated centimeters squared and a gradient of 40mm Hg.  Chest X-Ray: air trapping/emphysema  Carotid Duplex not done yet  Echo: pending      Imaging Results (Last 72 Hours)       ** No results found for the last 72 hours. **              Assessment:        NSTEMI (non-ST elevated myocardial infarction)        Plan:  IV heparin  Check platelet function  Pre-op for CABG, AVR      Gage Gibson MD  08/08/24  07:11 EDT      "

## 2024-08-08 NOTE — CASE MANAGEMENT/SOCIAL WORK
Discharge Planning Assessment  [facility]     Patient Name: Preet Edwards  MRN: [id number]  Today's Date: 8/8/2024    Admit Date: 8/7/2024    Plan: Home   Discharge Needs Assessment       Row Name 08/08/24 1528       Living Environment    People in Home spouse    Unique Family Situation Lives with wife.    Current Living Arrangements home    Potentially Unsafe Housing Conditions none    Primary Care Provided by self    Family Caregiver if Needed none       Resource/Environmental Concerns    Resource/Environmental Concerns none       Transportation Needs    In the past 12 months, has lack of transportation kept you from medical appointments or from getting medications? no    In the past 12 months, has lack of transportation kept you from meetings, work, or from getting things needed for daily living? No       Transition Planning    Patient/Family Anticipates Transition to home with family    Patient/Family Anticipated Services at Transition none       Discharge Needs Assessment    Equipment Currently Used at Home none    Equipment Needed After Discharge none                   Discharge Plan       Row Name 08/08/24 1528       Plan    Plan Home    Patient/Family in Agreement with Plan yes    Plan Comments Spoke with patient and family. Patient lives with Wife in [place]. He is independent with ADLs. He doesn't use any DME. His not current with home health services. PCP is Gonzalez Romero MD. Insurance is [employer]. Patient gets his medication filled at [facility]. Patient discharge plan is home with family to transport.. CM will follow.    Final Discharge Disposition Code 01 - home or self-care                  Continued Care and Services - Admitted Since 8/7/2024    No active coordination exists for this encounter.          Demographic Summary       Row Name 08/08/24 1527       General Information    Admission Type inpatient    Preferred Language English      Row Name  08/08/24 1240       General Information    Admission Type inpatient                   Functional Status       Row Name 08/08/24 1527       Functional Status    Usual Activity Tolerance good    Current Activity Tolerance good       Functional Status, IADL    Medications independent    Meal Preparation independent    Housekeeping independent    Laundry independent    Shopping independent                   Psychosocial    No documentation.                  Abuse/Neglect    No documentation.                  Legal    No documentation.                  Substance Abuse    No documentation.                  Patient Forms    No documentation.                     Nelda Goldman RN

## 2024-08-08 NOTE — PROGRESS NOTES
Pt. Identified as qualifier for Phase II Cardiac Rehab. Staff discussed benefits of exercise, program protocol, and educational material provided. Teach back verified. Staff will follow up with Patient after surgery to send referral for Phase II Cardiac Rehab to either, [facility], [facility] in [place], or [facility] in  [place]. Teach back verified.

## 2024-08-08 NOTE — PROGRESS NOTES
"  [facility] at [facility]   Inpatient Progress Note       LOS: 1 day   Patient Care Team:  Edgard Romero MD as PCP - General (Family Medicine)    Chief Complaint: Follow-up for NSTEMI    Subjective     Interval History:     Patient in bed. No current CV complaints. Heparin infusing.     Review of Systems:   Pertinent positives noted in history, exam, and assessment. Otherwise reviewed and negative.      Objective     Vitals:  Blood pressure 97/70, pulse 87, temperature 98.2 °F (36.8 °C), temperature source Oral, resp. rate 16, height 172.7 cm (67.99\"), weight 76.3 kg (168 lb 4.8 oz), SpO2 96%.     Intake/Output Summary (Last 24 hours) at 8/8/2024 1006  Last data filed at 8/8/2024 0800  Gross per 24 hour   Intake 240 ml   Output --   Net 240 ml     Vitals reviewed.   Constitutional:       Appearance: Well-developed and not in distress.   Neck:      Vascular: No JVD.      Trachea: No tracheal deviation.   Pulmonary:      Effort: Pulmonary effort is normal.      Breath sounds: Bibasilar Rales present.   Cardiovascular:      Normal rate. Regular rhythm.      Murmurs: There is a grade 2/6 systolic murmur.      Comments: Right radial access site benign  Pulses:     Intact distal pulses.   Edema:     Peripheral edema absent.   Musculoskeletal:         General: No deformity. Skin:     General: Skin is warm and dry.   Neurological:      Mental Status: Alert and oriented to person, place, and time.            Results Review:     I reviewed the patient's new clinical results.    Results from last 7 days   Lab Units 08/08/24  0826   WBC 10*3/mm3 7.94   HEMOGLOBIN g/dL 13.2   HEMATOCRIT % 39.4   PLATELETS 10*3/mm3 252     Results from last 7 days   Lab Units 08/08/24  0325   SODIUM mmol/L 137   POTASSIUM mmol/L 4.0   CHLORIDE mmol/L 105   CO2 mmol/L 22.0   BUN mg/dL 28*   CREATININE mg/dL 1.19   CALCIUM mg/dL 8.8   GLUCOSE mg/dL 102*     Results from last 7 days   Lab Units 08/08/24  0325   SODIUM mmol/L " 137   POTASSIUM mmol/L 4.0   CHLORIDE mmol/L 105   CO2 mmol/L 22.0   BUN mg/dL 28*   CREATININE mg/dL 1.19   GLUCOSE mg/dL 102*   CALCIUM mg/dL 8.8     Results from last 7 days   Lab Units 08/07/24 2034   INR  1.28*     Lab Results   Lab Value Date/Time    TROPONINT 850 (C) 08/08/2024 0325     Results from last 7 days   Lab Units 08/08/24  0325   TSH uIU/mL 4.230*     Results from last 7 days   Lab Units 08/08/24  0325   CHOLESTEROL mg/dL 205*   TRIGLYCERIDES mg/dL 163*   HDL CHOL mg/dL 33*   LDL CHOL mg/dL 142*         Results from last 7 days   Lab Units 08/08/24  0325   HSTROP T ng/L 850*     LHC:    Multivessel coronary artery disease.    90% ulcerated proximal LAD stenosis, involves origin of moderate-sized diagonal    80% proximal left circumflex stenosis.    Small subtotally occluded OM1    Chronically occluded proximal RCA with 3+ collaterals to the large right PDA    LVEF 30%    Likely critical aortic stenosis with peak to peak gradient of 46 mmHg.  In the setting of severe LV dysfunction this is likely critical aortic stenosis    Elevated LVEDP of 38    Tele:  SR    Echo ordered and pending.      Assessment:      NSTEMI (non-ST elevated myocardial infarction)      Non-STEMI.  [facility] 8/7/2024 MV CAD  Ischemic cardiomyopathy  EF 30% by LV gram  BB held due to marginal BP  Critical aortic stenosis  Peak gradient 46 mmHg in setting of severe LV dysfunction.  Dyslipidemia  Acute HFrEF  Remote tobacco use resolved 14 years ago  COPD  Elevated TSH    Plan:  Start GDMT as tolerated.   Currently no ACE/ARB/ARni due to marginal BP  Will give Lasix 20 mg IV today  Check free T4 due to elevated TSH.   Await surgical timing per CTS.    DESEAN Ferguson   Dictated utilizing Dragon dictation

## 2024-08-08 NOTE — PLAN OF CARE
Goal Outcome Evaluation:   New PIV placed. Pt continues to be on Hep gtt. Pt denies chest pain, SOB, nausea, vomiting. Tolerating PO intake well. Pt instructed to remain NPO after midnight. Pt verbalizes understanding. Family at the bedside. Pt denies any needs at this time.

## 2024-08-08 NOTE — PLAN OF CARE
Problem: Adult Inpatient Plan of Care  Goal: Plan of Care Review  Outcome: Ongoing, Progressing  Goal: Patient-Specific Goal (Individualized)  Outcome: Ongoing, Progressing  Goal: Absence of Hospital-Acquired Illness or Injury  Outcome: Ongoing, Progressing  Intervention: Identify and Manage Fall Risk  Recent Flowsheet Documentation  Taken 8/8/2024 0400 by Demetrio Chakraborty RN  Safety Promotion/Fall Prevention:   safety round/check completed   activity supervised   assistive device/personal items within reach   clutter free environment maintained   fall prevention program maintained  Taken 8/8/2024 0200 by Demetrio Chakraborty RN  Safety Promotion/Fall Prevention:   activity supervised   assistive device/personal items within reach   safety round/check completed   clutter free environment maintained   nonskid shoes/slippers when out of bed  Taken 8/8/2024 0000 by Demetrio Chakraborty RN  Safety Promotion/Fall Prevention:   safety round/check completed   activity supervised   assistive device/personal items within reach   clutter free environment maintained   fall prevention program maintained  Taken 8/7/2024 2200 by Demetrio Chakraborty RN  Safety Promotion/Fall Prevention:   activity supervised   assistive device/personal items within reach   safety round/check completed   clutter free environment maintained   nonskid shoes/slippers when out of bed  Taken 8/7/2024 2038 by Demetrio Chakraborty RN  Safety Promotion/Fall Prevention:   safety round/check completed   activity supervised   assistive device/personal items within reach   clutter free environment maintained   fall prevention program maintained  Intervention: Prevent Skin Injury  Recent Flowsheet Documentation  Taken 8/8/2024 0400 by Demetrio Chakraborty RN  Body Position: position changed independently  Skin Protection: adhesive use limited  Taken 8/8/2024 0200 by Demetrio Chakraborty RN  Body Position: position changed independently  Skin Protection:  adhesive use limited  Taken 8/8/2024 0000 by Demetrio Chakraborty RN  Body Position: position changed independently  Taken 8/7/2024 2200 by Demetrio Chakraborty RN  Body Position: position changed independently  Taken 8/7/2024 2038 by Demetrio Chakraborty RN  Body Position: position changed independently  Skin Protection: adhesive use limited  Intervention: Prevent and Manage VTE (Venous Thromboembolism) Risk  Recent Flowsheet Documentation  Taken 8/8/2024 0400 by Demetrio Chakraborty RN  Activity Management: activity minimized  Taken 8/8/2024 0200 by Demetrio Chakraborty RN  Activity Management: activity minimized  Taken 8/8/2024 0000 by Demetrio Chakraborty RN  Activity Management: activity minimized  Taken 8/7/2024 2200 by Demetrio Chakraborty RN  Activity Management: activity minimized  Taken 8/7/2024 2038 by Demetrio Chakraborty RN  Activity Management: activity encouraged  VTE Prevention/Management: (hep gtt) other (see comments)  Range of Motion: active ROM (range of motion) encouraged  Intervention: Prevent Infection  Recent Flowsheet Documentation  Taken 8/8/2024 0400 by Demetrio Chakraborty RN  Infection Prevention:   hand hygiene promoted   rest/sleep promoted  Taken 8/8/2024 0200 by Demetrio Chakraborty RN  Infection Prevention:   environmental surveillance performed   rest/sleep promoted  Taken 8/8/2024 0000 by Demetrio Chakraborty RN  Infection Prevention:   hand hygiene promoted   rest/sleep promoted  Taken 8/7/2024 2200 by Demetrio Chakraborty RN  Infection Prevention:   environmental surveillance performed   rest/sleep promoted  Taken 8/7/2024 2038 by Demetrio Chakraborty RN  Infection Prevention:   cohorting utilized   hand hygiene promoted   rest/sleep promoted  Goal: Optimal Comfort and Wellbeing  Outcome: Ongoing, Progressing  Intervention: Provide Person-Centered Care  Recent Flowsheet Documentation  Taken 8/7/2024 2038 by Demetrio Chakraborty RN  Trust Relationship/Rapport:   care explained   emotional  support provided   questions answered   questions encouraged   thoughts/feelings acknowledged  Goal: Readiness for Transition of Care  Outcome: Ongoing, Progressing  Intervention: Mutually Develop Transition Plan  Recent Flowsheet Documentation  Taken 8/7/2024 2044 by Demetrio Chakraborty RN  Transportation Anticipated:   car, drives self   family or friend will provide  Patient/Family Anticipated Services at Transition: none  Patient/Family Anticipates Transition to:   home with family   home  Taken 8/7/2024 2037 by Demetrio Chakraborty RN  Equipment Currently Used at Home: none     Problem: COPD (Chronic Obstructive Pulmonary Disease) Comorbidity  Goal: Maintenance of COPD Symptom Control  Outcome: Ongoing, Progressing     Problem: Heart Failure Comorbidity  Goal: Maintenance of Heart Failure Symptom Control  Outcome: Ongoing, Progressing     Problem: Pain Chronic (Persistent) (Comorbidity Management)  Goal: Acceptable Pain Control and Functional Ability  Outcome: Ongoing, Progressing  Intervention: Optimize Psychosocial Wellbeing  Recent Flowsheet Documentation  Taken 8/7/2024 2038 by Demetrio Chakraborty RN  Diversional Activities: television  Family/Support System Care: support provided     Problem: Fall Injury Risk  Goal: Absence of Fall and Fall-Related Injury  Outcome: Ongoing, Progressing  Intervention: Identify and Manage Contributors  Recent Flowsheet Documentation  Taken 8/8/2024 0200 by Demetrio Chakraborty RN  Self-Care Promotion: independence encouraged  Intervention: Promote Injury-Free Environment  Recent Flowsheet Documentation  Taken 8/8/2024 0400 by Demetrio Chakraborty RN  Safety Promotion/Fall Prevention:   safety round/check completed   activity supervised   assistive device/personal items within reach   clutter free environment maintained   fall prevention program maintained  Taken 8/8/2024 0200 by Demetrio Chakraborty RN  Safety Promotion/Fall Prevention:   activity supervised   assistive  device/personal items within reach   safety round/check completed   clutter free environment maintained   nonskid shoes/slippers when out of bed  Taken 8/8/2024 0000 by Demetrio Chakraborty, RN  Safety Promotion/Fall Prevention:   safety round/check completed   activity supervised   assistive device/personal items within reach   clutter free environment maintained   fall prevention program maintained  Taken 8/7/2024 2200 by Demetrio Chakraborty, RN  Safety Promotion/Fall Prevention:   activity supervised   assistive device/personal items within reach   safety round/check completed   clutter free environment maintained   nonskid shoes/slippers when out of bed  Taken 8/7/2024 2038 by Demetrio Chakraborty, RN  Safety Promotion/Fall Prevention:   safety round/check completed   activity supervised   assistive device/personal items within reach   clutter free environment maintained   fall prevention program maintained     Problem: Adjustment to Illness (Heart Failure)  Goal: Optimal Coping  Outcome: Ongoing, Progressing  Intervention: Support Psychosocial Response  Recent Flowsheet Documentation  Taken 8/7/2024 2038 by Demetrio Chakraborty, RN  Family/Support System Care: support provided     Problem: Cardiac Output Decreased (Heart Failure)  Goal: Optimal Cardiac Output  Outcome: Ongoing, Progressing     Problem: Dysrhythmia (Heart Failure)  Goal: Stable Heart Rate and Rhythm  Outcome: Ongoing, Progressing     Problem: Functional Ability Impaired (Heart Failure)  Goal: Optimal Functional Ability  Outcome: Ongoing, Progressing  Intervention: Optimize Functional Ability  Recent Flowsheet Documentation  Taken 8/8/2024 0400 by Demetrio Chakraborty, RN  Activity Management: activity minimized  Taken 8/8/2024 0200 by Demetrio Chakraborty, RN  Activity Management: activity minimized  Self-Care Promotion: independence encouraged  Taken 8/8/2024 0000 by Demetrio Chakraborty, RN  Activity Management: activity minimized  Taken 8/7/2024 2200 by  Demetrio Chakraborty, RN  Activity Management: activity minimized  Taken 8/7/2024 2038 by Demetrio Chakraborty, RN  Activity Management: activity encouraged   Goal Outcome Evaluation:            Pt has no new nursing issues at this time. Pt is a&ox4, NSR, VSS, RA, wife @ bedside. Hep gtt at 15 units/kg/hr. Pt had 2 runs of Vtach, provider notified and no new orders received. Pt is asymptomatic, no chest pain. Pt has no complaints at this time.

## 2024-08-09 ENCOUNTER — ANESTHESIA EVENT CONVERTED (OUTPATIENT)
Dept: ANESTHESIOLOGY | Facility: HOSPITAL | Age: 69
End: 2024-08-09
Payer: COMMERCIAL

## 2024-08-09 ENCOUNTER — ANESTHESIA (OUTPATIENT)
Dept: PERIOP | Facility: HOSPITAL | Age: 69
End: 2024-08-09
Payer: COMMERCIAL

## 2024-08-09 ENCOUNTER — ANCILLARY PROCEDURE (OUTPATIENT)
Dept: PERIOP | Facility: HOSPITAL | Age: 69
End: 2024-08-09
Payer: COMMERCIAL

## 2024-08-09 ENCOUNTER — APPOINTMENT (OUTPATIENT)
Dept: GENERAL RADIOLOGY | Facility: HOSPITAL | Age: 69
End: 2024-08-09
Payer: COMMERCIAL

## 2024-08-09 PROBLEM — I65.23 BILATERAL CAROTID ARTERY STENOSIS: Status: ACTIVE | Noted: 2024-08-09

## 2024-08-09 PROBLEM — E78.5 DYSLIPIDEMIA: Chronic | Status: ACTIVE | Noted: 2024-08-09

## 2024-08-09 PROBLEM — I35.0 SEVERE AORTIC STENOSIS: Status: ACTIVE | Noted: 2024-08-09

## 2024-08-09 PROBLEM — Z87.891 FORMER SMOKER: Status: ACTIVE | Noted: 2024-08-09

## 2024-08-09 PROBLEM — I25.5 ISCHEMIC CARDIOMYOPATHY: Status: ACTIVE | Noted: 2024-08-09

## 2024-08-09 PROBLEM — E78.5 DYSLIPIDEMIA: Status: ACTIVE | Noted: 2024-08-09

## 2024-08-09 PROBLEM — Z87.891 FORMER SMOKER: Chronic | Status: ACTIVE | Noted: 2024-08-09

## 2024-08-09 LAB
ALBUMIN SERPL-MCNC: 4.1 G/DL (ref 3.5–5.2)
AMPHET+METHAMPHET UR QL: NEGATIVE
AMPHETAMINES UR QL: NEGATIVE
ANION GAP SERPL CALCULATED.3IONS-SCNC: 11 MMOL/L (ref 5–15)
ANION GAP SERPL CALCULATED.3IONS-SCNC: 14 MMOL/L (ref 5–15)
ARTERIAL PATENCY WRIST A: ABNORMAL
ATMOSPHERIC PRESS: ABNORMAL MM[HG]
BARBITURATES UR QL SCN: NEGATIVE
BASE EXCESS BLDA CALC-SCNC: -1 MMOL/L (ref -5–5)
BASE EXCESS BLDA CALC-SCNC: -2 MMOL/L (ref -5–5)
BASE EXCESS BLDA CALC-SCNC: -4 MMOL/L (ref -5–5)
BASE EXCESS BLDA CALC-SCNC: -4.3 MMOL/L (ref 0–2)
BASE EXCESS BLDA CALC-SCNC: -5 MMOL/L (ref -5–5)
BASE EXCESS BLDA CALC-SCNC: -5 MMOL/L (ref -5–5)
BDY SITE: ABNORMAL
BENZODIAZ UR QL SCN: NEGATIVE
BODY TEMPERATURE: 37
BUN SERPL-MCNC: 22 MG/DL (ref 8–23)
BUN SERPL-MCNC: 24 MG/DL (ref 8–23)
BUN/CREAT SERPL: 15.7 (ref 7–25)
BUN/CREAT SERPL: 17.1 (ref 7–25)
BUPRENORPHINE SERPL-MCNC: NEGATIVE NG/ML
CA-I BLDA-SCNC: 0.91 MMOL/L (ref 1.2–1.32)
CA-I BLDA-SCNC: 0.93 MMOL/L (ref 1.2–1.32)
CA-I BLDA-SCNC: 0.97 MMOL/L (ref 1.2–1.32)
CA-I BLDA-SCNC: 0.99 MMOL/L (ref 1.2–1.32)
CA-I BLDA-SCNC: 1 MMOL/L (ref 1.2–1.32)
CA-I BLDA-SCNC: 1.08 MMOL/L (ref 1.2–1.32)
CA-I BLDA-SCNC: 1.12 MMOL/L (ref 1.2–1.32)
CA-I BLDA-SCNC: 1.18 MMOL/L (ref 1.2–1.32)
CA-I BLDA-SCNC: 1.21 MMOL/L (ref 1.2–1.32)
CA-I BLDA-SCNC: 1.22 MMOL/L (ref 1.2–1.32)
CA-I BLDA-SCNC: 1.38 MMOL/L (ref 1.2–1.32)
CALCIUM SPEC-SCNC: 8.4 MG/DL (ref 8.6–10.5)
CALCIUM SPEC-SCNC: 8.9 MG/DL (ref 8.6–10.5)
CANNABINOIDS SERPL QL: NEGATIVE
CHLORIDE SERPL-SCNC: 104 MMOL/L (ref 98–107)
CHLORIDE SERPL-SCNC: 110 MMOL/L (ref 98–107)
CHOLEST SERPL-MCNC: 80 MG/DL (ref 0–200)
CO2 BLDA-SCNC: 21.1 MMOL/L (ref 22–33)
CO2 BLDA-SCNC: 23 MMOL/L (ref 24–29)
CO2 BLDA-SCNC: 24 MMOL/L (ref 24–29)
CO2 BLDA-SCNC: 24 MMOL/L (ref 24–29)
CO2 BLDA-SCNC: 25 MMOL/L (ref 24–29)
CO2 BLDA-SCNC: 26 MMOL/L (ref 24–29)
CO2 BLDA-SCNC: 26 MMOL/L (ref 24–29)
CO2 SERPL-SCNC: 20 MMOL/L (ref 22–29)
CO2 SERPL-SCNC: 26 MMOL/L (ref 22–29)
COCAINE UR QL: NEGATIVE
COHGB MFR BLD: 1.2 % (ref 0–2)
CREAT SERPL-MCNC: 1.4 MG/DL (ref 0.76–1.27)
CREAT SERPL-MCNC: 1.4 MG/DL (ref 0.76–1.27)
DEPRECATED RDW RBC AUTO: 40.4 FL (ref 37–54)
DEPRECATED RDW RBC AUTO: 41.7 FL (ref 37–54)
EGFRCR SERPLBLD CKD-EPI 2021: 55.4 ML/MIN/1.73
EGFRCR SERPLBLD CKD-EPI 2021: 55.4 ML/MIN/1.73
EPAP: 0
ERYTHROCYTE [DISTWIDTH] IN BLOOD BY AUTOMATED COUNT: 12.3 % (ref 12.3–15.4)
ERYTHROCYTE [DISTWIDTH] IN BLOOD BY AUTOMATED COUNT: 12.6 % (ref 12.3–15.4)
FENTANYL UR-MCNC: POSITIVE NG/ML
GLUCOSE BLDC GLUCOMTR-MCNC: 106 MG/DL (ref 70–130)
GLUCOSE BLDC GLUCOMTR-MCNC: 107 MG/DL (ref 70–130)
GLUCOSE BLDC GLUCOMTR-MCNC: 109 MG/DL (ref 70–130)
GLUCOSE BLDC GLUCOMTR-MCNC: 117 MG/DL (ref 70–130)
GLUCOSE BLDC GLUCOMTR-MCNC: 117 MG/DL (ref 70–130)
GLUCOSE BLDC GLUCOMTR-MCNC: 132 MG/DL (ref 70–130)
GLUCOSE BLDC GLUCOMTR-MCNC: 134 MG/DL (ref 70–130)
GLUCOSE BLDC GLUCOMTR-MCNC: 138 MG/DL (ref 70–130)
GLUCOSE BLDC GLUCOMTR-MCNC: 154 MG/DL (ref 70–130)
GLUCOSE BLDC GLUCOMTR-MCNC: 168 MG/DL (ref 70–130)
GLUCOSE BLDC GLUCOMTR-MCNC: 169 MG/DL (ref 70–130)
GLUCOSE BLDC GLUCOMTR-MCNC: 172 MG/DL (ref 70–130)
GLUCOSE BLDC GLUCOMTR-MCNC: 175 MG/DL (ref 70–130)
GLUCOSE BLDC GLUCOMTR-MCNC: 182 MG/DL (ref 70–130)
GLUCOSE BLDC GLUCOMTR-MCNC: 200 MG/DL (ref 70–130)
GLUCOSE BLDC GLUCOMTR-MCNC: 214 MG/DL (ref 70–130)
GLUCOSE BLDC GLUCOMTR-MCNC: 218 MG/DL (ref 70–130)
GLUCOSE SERPL-MCNC: 119 MG/DL (ref 65–99)
GLUCOSE SERPL-MCNC: 182 MG/DL (ref 65–99)
HCO3 BLDA-SCNC: 20.1 MMOL/L (ref 20–26)
HCO3 BLDA-SCNC: 21.6 MMOL/L (ref 22–26)
HCO3 BLDA-SCNC: 21.8 MMOL/L (ref 22–26)
HCO3 BLDA-SCNC: 22.1 MMOL/L (ref 22–26)
HCO3 BLDA-SCNC: 22.7 MMOL/L (ref 22–26)
HCO3 BLDA-SCNC: 22.9 MMOL/L (ref 22–26)
HCO3 BLDA-SCNC: 23.6 MMOL/L (ref 22–26)
HCO3 BLDA-SCNC: 23.6 MMOL/L (ref 22–26)
HCO3 BLDA-SCNC: 24 MMOL/L (ref 22–26)
HCO3 BLDA-SCNC: 24.1 MMOL/L (ref 22–26)
HCO3 BLDA-SCNC: 24.2 MMOL/L (ref 22–26)
HCO3 BLDA-SCNC: 24.9 MMOL/L (ref 22–26)
HCT VFR BLD AUTO: 32.2 % (ref 37.5–51)
HCT VFR BLD AUTO: 38 % (ref 37.5–51)
HCT VFR BLD CALC: 33.2 % (ref 38–51)
HCT VFR BLDA CALC: 23 % (ref 38–51)
HCT VFR BLDA CALC: 23 % (ref 38–51)
HCT VFR BLDA CALC: 24 % (ref 38–51)
HCT VFR BLDA CALC: 25 % (ref 38–51)
HCT VFR BLDA CALC: 26 % (ref 38–51)
HCT VFR BLDA CALC: 26 % (ref 38–51)
HCT VFR BLDA CALC: 29 % (ref 38–51)
HCT VFR BLDA CALC: 35 % (ref 38–51)
HCT VFR BLDA CALC: 36 % (ref 38–51)
HDLC SERPL-MCNC: 15 MG/DL (ref 40–60)
HGB BLD-MCNC: 10.5 G/DL (ref 13–17.7)
HGB BLD-MCNC: 12.7 G/DL (ref 13–17.7)
HGB BLDA-MCNC: 10.8 G/DL (ref 13.5–17.5)
HGB BLDA-MCNC: 11.9 G/DL (ref 12–17)
HGB BLDA-MCNC: 12.2 G/DL (ref 12–17)
HGB BLDA-MCNC: 7.8 G/DL (ref 12–17)
HGB BLDA-MCNC: 7.8 G/DL (ref 12–17)
HGB BLDA-MCNC: 8.2 G/DL (ref 12–17)
HGB BLDA-MCNC: 8.5 G/DL (ref 12–17)
HGB BLDA-MCNC: 8.8 G/DL (ref 12–17)
HGB BLDA-MCNC: 8.8 G/DL (ref 12–17)
HGB BLDA-MCNC: 9.9 G/DL (ref 12–17)
INHALED O2 CONCENTRATION: 100 %
INR PPP: 1.79 (ref 0.89–1.12)
IPAP: 0
LDLC SERPL CALC-MCNC: 49 MG/DL (ref 0–100)
LDLC/HDLC SERPL: 3.37 {RATIO}
MCH RBC QN AUTO: 29.2 PG (ref 26.6–33)
MCH RBC QN AUTO: 30.2 PG (ref 26.6–33)
MCHC RBC AUTO-ENTMCNC: 32.6 G/DL (ref 31.5–35.7)
MCHC RBC AUTO-ENTMCNC: 33.4 G/DL (ref 31.5–35.7)
MCV RBC AUTO: 89.7 FL (ref 79–97)
MCV RBC AUTO: 90.3 FL (ref 79–97)
METHADONE UR QL SCN: NEGATIVE
METHGB BLD QL: 0.2 % (ref 0–1.5)
MODALITY: ABNORMAL
OPIATES UR QL: NEGATIVE
OXYCODONE UR QL SCN: NEGATIVE
OXYHGB MFR BLDV: ABNORMAL %
PA ADP PRP-ACNC: 232 PRU
PAW @ PEAK INSP FLOW SETTING VENT: 0 CMH2O
PCO2 BLDA: 33.5 MM HG (ref 35–45)
PCO2 BLDA: 35.4 MM HG (ref 35–45)
PCO2 BLDA: 37.8 MM HG (ref 35–45)
PCO2 BLDA: 39 MM HG (ref 35–45)
PCO2 BLDA: 40 MM HG (ref 35–45)
PCO2 BLDA: 40.1 MM HG (ref 35–45)
PCO2 BLDA: 42.7 MM HG (ref 35–45)
PCO2 BLDA: 43.1 MM HG (ref 35–45)
PCO2 BLDA: 43.5 MM HG (ref 35–45)
PCO2 BLDA: 46.9 MM HG (ref 35–45)
PCO2 BLDA: 47.5 MM HG (ref 35–45)
PCO2 BLDA: 49 MM HG (ref 35–45)
PCO2 TEMP ADJ BLD: 33.5 MM HG (ref 35–48)
PCP UR QL SCN: NEGATIVE
PEEP RESPIRATORY: 5 CM[H2O]
PH BLDA: 7.27 PH UNITS (ref 7.35–7.6)
PH BLDA: 7.28 PH UNITS (ref 7.35–7.6)
PH BLDA: 7.32 PH UNITS (ref 7.35–7.6)
PH BLDA: 7.34 PH UNITS (ref 7.35–7.6)
PH BLDA: 7.34 PH UNITS (ref 7.35–7.6)
PH BLDA: 7.36 PH UNITS (ref 7.35–7.6)
PH BLDA: 7.36 PH UNITS (ref 7.35–7.6)
PH BLDA: 7.39 PH UNITS (ref 7.35–7.45)
PH BLDA: 7.39 PH UNITS (ref 7.35–7.6)
PH BLDA: 7.42 PH UNITS (ref 7.35–7.6)
PH, TEMP CORRECTED: 7.39 PH UNITS
PHOSPHATE SERPL-MCNC: 3 MG/DL (ref 2.5–4.5)
PLATELET # BLD AUTO: 230 10*3/MM3 (ref 140–450)
PLATELET # BLD AUTO: 82 10*3/MM3 (ref 140–450)
PMV BLD AUTO: 11.4 FL (ref 6–12)
PMV BLD AUTO: 11.5 FL (ref 6–12)
PO2 BLDA: 289 MMHG (ref 80–105)
PO2 BLDA: 310 MMHG (ref 80–105)
PO2 BLDA: 347 MMHG (ref 80–105)
PO2 BLDA: 367 MMHG (ref 80–105)
PO2 BLDA: 395 MM HG (ref 83–108)
PO2 BLDA: 41 MMHG (ref 80–105)
PO2 BLDA: 413 MMHG (ref 80–105)
PO2 BLDA: 417 MMHG (ref 80–105)
PO2 BLDA: 436 MMHG (ref 80–105)
PO2 BLDA: 436 MMHG (ref 80–105)
PO2 BLDA: 510 MMHG (ref 80–105)
PO2 BLDA: 77 MMHG (ref 80–105)
PO2 TEMP ADJ BLD: 395 MM HG (ref 83–108)
POTASSIUM BLDA-SCNC: 4.2 MMOL/L (ref 3.5–4.9)
POTASSIUM BLDA-SCNC: 4.2 MMOL/L (ref 3.5–4.9)
POTASSIUM BLDA-SCNC: 4.3 MMOL/L (ref 3.5–4.9)
POTASSIUM BLDA-SCNC: 4.8 MMOL/L (ref 3.5–4.9)
POTASSIUM BLDA-SCNC: 5 MMOL/L (ref 3.5–4.9)
POTASSIUM BLDA-SCNC: 5.8 MMOL/L (ref 3.5–4.9)
POTASSIUM BLDA-SCNC: 6 MMOL/L (ref 3.5–4.9)
POTASSIUM BLDA-SCNC: 6.1 MMOL/L (ref 3.5–4.9)
POTASSIUM BLDA-SCNC: 6.3 MMOL/L (ref 3.5–4.9)
POTASSIUM BLDA-SCNC: 6.5 MMOL/L (ref 3.5–4.9)
POTASSIUM BLDA-SCNC: 6.8 MMOL/L (ref 3.5–4.9)
POTASSIUM SERPL-SCNC: 4.3 MMOL/L (ref 3.5–5.2)
POTASSIUM SERPL-SCNC: 4.4 MMOL/L (ref 3.5–5.2)
PROTHROMBIN TIME: 20.9 SECONDS (ref 12.2–14.5)
PSV: 10 CMH2O
QT INTERVAL: 356 MS
QT INTERVAL: 374 MS
QTC INTERVAL: 426 MS
QTC INTERVAL: 465 MS
RBC # BLD AUTO: 3.59 10*6/MM3 (ref 4.14–5.8)
RBC # BLD AUTO: 4.21 10*6/MM3 (ref 4.14–5.8)
SAO2 % BLDA: 100 % (ref 95–98)
SAO2 % BLDA: 75 % (ref 95–98)
SAO2 % BLDA: 95 % (ref 95–98)
SODIUM BLD-SCNC: 135 MMOL/L (ref 138–146)
SODIUM BLD-SCNC: 136 MMOL/L (ref 138–146)
SODIUM BLD-SCNC: 138 MMOL/L (ref 138–146)
SODIUM BLD-SCNC: 139 MMOL/L (ref 138–146)
SODIUM BLD-SCNC: 140 MMOL/L (ref 138–146)
SODIUM BLD-SCNC: 141 MMOL/L (ref 138–146)
SODIUM BLD-SCNC: 145 MMOL/L (ref 138–146)
SODIUM SERPL-SCNC: 141 MMOL/L (ref 136–145)
SODIUM SERPL-SCNC: 144 MMOL/L (ref 136–145)
TOTAL RATE: 14 BREATHS/MINUTE
TRICYCLICS UR QL SCN: NEGATIVE
TRIGL SERPL-MCNC: 72 MG/DL (ref 0–150)
UFH PPP CHRO-ACNC: 0.35 IU/ML (ref 0.3–0.7)
VENTILATOR MODE: ABNORMAL
VLDLC SERPL-MCNC: 16 MG/DL (ref 5–40)
VT ON VENT VENT: 0.67 ML
WBC NRBC COR # BLD AUTO: 11.03 10*3/MM3 (ref 3.4–10.8)
WBC NRBC COR # BLD AUTO: 7.67 10*3/MM3 (ref 3.4–10.8)

## 2024-08-09 PROCEDURE — 25810000003 SODIUM CHLORIDE 0.9 % SOLUTION 250 ML FLEX CONT: Performed by: ANESTHESIOLOGY

## 2024-08-09 PROCEDURE — 82947 ASSAY GLUCOSE BLOOD QUANT: CPT

## 2024-08-09 PROCEDURE — 88305 TISSUE EXAM BY PATHOLOGIST: CPT | Performed by: THORACIC SURGERY (CARDIOTHORACIC VASCULAR SURGERY)

## 2024-08-09 PROCEDURE — P9047 ALBUMIN (HUMAN), 25%, 50ML: HCPCS | Performed by: PHYSICIAN ASSISTANT

## 2024-08-09 PROCEDURE — 82330 ASSAY OF CALCIUM: CPT

## 2024-08-09 PROCEDURE — 85027 COMPLETE CBC AUTOMATED: CPT | Performed by: THORACIC SURGERY (CARDIOTHORACIC VASCULAR SURGERY)

## 2024-08-09 PROCEDURE — 80061 LIPID PANEL: CPT | Performed by: INTERNAL MEDICINE

## 2024-08-09 PROCEDURE — 93318 ECHO TRANSESOPHAGEAL INTRAOP: CPT

## 2024-08-09 PROCEDURE — 82805 BLOOD GASES W/O2 SATURATION: CPT

## 2024-08-09 PROCEDURE — 25010000002 ALBUMIN HUMAN 5% PER 50 ML: Performed by: ANESTHESIOLOGY

## 2024-08-09 PROCEDURE — 80307 DRUG TEST PRSMV CHEM ANLYZR: CPT

## 2024-08-09 PROCEDURE — 25010000002 ALBUMIN HUMAN 25% PER 50 ML: Performed by: PHYSICIAN ASSISTANT

## 2024-08-09 PROCEDURE — 25010000002 HEPARIN (PORCINE) PER 1000 UNITS: Performed by: THORACIC SURGERY (CARDIOTHORACIC VASCULAR SURGERY)

## 2024-08-09 PROCEDURE — 85347 COAGULATION TIME ACTIVATED: CPT

## 2024-08-09 PROCEDURE — 86900 BLOOD TYPING SEROLOGIC ABO: CPT

## 2024-08-09 PROCEDURE — 25810000003 SODIUM CHLORIDE PER 500 ML: Performed by: THORACIC SURGERY (CARDIOTHORACIC VASCULAR SURGERY)

## 2024-08-09 PROCEDURE — 84295 ASSAY OF SERUM SODIUM: CPT

## 2024-08-09 PROCEDURE — 82803 BLOOD GASES ANY COMBINATION: CPT

## 2024-08-09 PROCEDURE — C1894 INTRO/SHEATH, NON-LASER: HCPCS | Performed by: THORACIC SURGERY (CARDIOTHORACIC VASCULAR SURGERY)

## 2024-08-09 PROCEDURE — 85610 PROTHROMBIN TIME: CPT | Performed by: THORACIC SURGERY (CARDIOTHORACIC VASCULAR SURGERY)

## 2024-08-09 PROCEDURE — C1889 IMPLANT/INSERT DEVICE, NOC: HCPCS | Performed by: THORACIC SURGERY (CARDIOTHORACIC VASCULAR SURGERY)

## 2024-08-09 PROCEDURE — 94002 VENT MGMT INPAT INIT DAY: CPT

## 2024-08-09 PROCEDURE — 33405 REPLACEMENT AORTIC VALVE OPN: CPT | Performed by: THORACIC SURGERY (CARDIOTHORACIC VASCULAR SURGERY)

## 2024-08-09 PROCEDURE — 84132 ASSAY OF SERUM POTASSIUM: CPT

## 2024-08-09 PROCEDURE — 25010000002 FENTANYL CITRATE (PF) 1000 MCG/20ML SOLUTION: Performed by: ANESTHESIOLOGY

## 2024-08-09 PROCEDURE — 25010000002 CEFAZOLIN PER 500 MG

## 2024-08-09 PROCEDURE — 25010000002 MIDAZOLAM PER 1 MG: Performed by: ANESTHESIOLOGY

## 2024-08-09 PROCEDURE — 85576 BLOOD PLATELET AGGREGATION: CPT | Performed by: PHYSICIAN ASSISTANT

## 2024-08-09 PROCEDURE — 25010000002 HEPARIN (PORCINE) PER 1000 UNITS: Performed by: ANESTHESIOLOGY

## 2024-08-09 PROCEDURE — 33533 CABG ARTERIAL SINGLE: CPT | Performed by: THORACIC SURGERY (CARDIOTHORACIC VASCULAR SURGERY)

## 2024-08-09 PROCEDURE — 71045 X-RAY EXAM CHEST 1 VIEW: CPT

## 2024-08-09 PROCEDURE — 25010000002 EPINEPHRINE PER 0.1 MG: Performed by: ANESTHESIOLOGY

## 2024-08-09 PROCEDURE — 25010000002 MILRINONE LACTATE 10 MG/10ML SOLUTION: Performed by: ANESTHESIOLOGY

## 2024-08-09 PROCEDURE — 85014 HEMATOCRIT: CPT

## 2024-08-09 PROCEDURE — 25810000003 DEXTROSE 5 % WITH KCL 20 MEQ 20 MEQ/L SOLUTION: Performed by: THORACIC SURGERY (CARDIOTHORACIC VASCULAR SURGERY)

## 2024-08-09 PROCEDURE — 84100 ASSAY OF PHOSPHORUS: CPT | Performed by: THORACIC SURGERY (CARDIOTHORACIC VASCULAR SURGERY)

## 2024-08-09 PROCEDURE — 02100Z9 BYPASS CORONARY ARTERY, ONE ARTERY FROM LEFT INTERNAL MAMMARY, OPEN APPROACH: ICD-10-PCS | Performed by: THORACIC SURGERY (CARDIOTHORACIC VASCULAR SURGERY)

## 2024-08-09 PROCEDURE — 25010000002 CEFAZOLIN PER 500 MG: Performed by: ANESTHESIOLOGY

## 2024-08-09 PROCEDURE — 33518 CABG ARTERY-VEIN TWO: CPT | Performed by: THORACIC SURGERY (CARDIOTHORACIC VASCULAR SURGERY)

## 2024-08-09 PROCEDURE — P9041 ALBUMIN (HUMAN),5%, 50ML: HCPCS

## 2024-08-09 PROCEDURE — 36430 TRANSFUSION BLD/BLD COMPNT: CPT

## 2024-08-09 PROCEDURE — B24BZZ4 ULTRASONOGRAPHY OF HEART WITH AORTA, TRANSESOPHAGEAL: ICD-10-PCS | Performed by: ANESTHESIOLOGY

## 2024-08-09 PROCEDURE — P9016 RBC LEUKOCYTES REDUCED: HCPCS

## 2024-08-09 PROCEDURE — C1751 CATH, INF, PER/CENT/MIDLINE: HCPCS | Performed by: ANESTHESIOLOGY

## 2024-08-09 PROCEDURE — 02RF08Z REPLACEMENT OF AORTIC VALVE WITH ZOOPLASTIC TISSUE, OPEN APPROACH: ICD-10-PCS | Performed by: THORACIC SURGERY (CARDIOTHORACIC VASCULAR SURGERY)

## 2024-08-09 PROCEDURE — 94799 UNLISTED PULMONARY SVC/PX: CPT

## 2024-08-09 PROCEDURE — P9041 ALBUMIN (HUMAN),5%, 50ML: HCPCS | Performed by: ANESTHESIOLOGY

## 2024-08-09 PROCEDURE — 25010000002 PAPAVERINE PER 60 MG: Performed by: THORACIC SURGERY (CARDIOTHORACIC VASCULAR SURGERY)

## 2024-08-09 PROCEDURE — 06BP4ZZ EXCISION OF RIGHT SAPHENOUS VEIN, PERCUTANEOUS ENDOSCOPIC APPROACH: ICD-10-PCS | Performed by: THORACIC SURGERY (CARDIOTHORACIC VASCULAR SURGERY)

## 2024-08-09 PROCEDURE — 99233 SBSQ HOSP IP/OBS HIGH 50: CPT | Performed by: INTERNAL MEDICINE

## 2024-08-09 PROCEDURE — 85520 HEPARIN ASSAY: CPT

## 2024-08-09 PROCEDURE — 25810000003 SODIUM CHLORIDE 0.9 % SOLUTION: Performed by: ANESTHESIOLOGY

## 2024-08-09 PROCEDURE — 25010000002 PROTAMINE SULFATE PER 10 MG: Performed by: ANESTHESIOLOGY

## 2024-08-09 PROCEDURE — 33405 REPLACEMENT AORTIC VALVE OPN: CPT | Performed by: PHYSICIAN ASSISTANT

## 2024-08-09 PROCEDURE — 0211093 BYPASS CORONARY ARTERY, TWO ARTERIES FROM CORONARY ARTERY WITH AUTOLOGOUS VENOUS TISSUE, OPEN APPROACH: ICD-10-PCS | Performed by: THORACIC SURGERY (CARDIOTHORACIC VASCULAR SURGERY)

## 2024-08-09 PROCEDURE — 33518 CABG ARTERY-VEIN TWO: CPT | Performed by: PHYSICIAN ASSISTANT

## 2024-08-09 PROCEDURE — 83735 ASSAY OF MAGNESIUM: CPT | Performed by: THORACIC SURGERY (CARDIOTHORACIC VASCULAR SURGERY)

## 2024-08-09 PROCEDURE — 25010000002 PROPOFOL 10 MG/ML EMULSION: Performed by: THORACIC SURGERY (CARDIOTHORACIC VASCULAR SURGERY)

## 2024-08-09 PROCEDURE — 33508 ENDOSCOPIC VEIN HARVEST: CPT | Performed by: PHYSICIAN ASSISTANT

## 2024-08-09 PROCEDURE — 82375 ASSAY CARBOXYHB QUANT: CPT

## 2024-08-09 PROCEDURE — 33533 CABG ARTERIAL SINGLE: CPT | Performed by: PHYSICIAN ASSISTANT

## 2024-08-09 PROCEDURE — 5A1221Z PERFORMANCE OF CARDIAC OUTPUT, CONTINUOUS: ICD-10-PCS | Performed by: THORACIC SURGERY (CARDIOTHORACIC VASCULAR SURGERY)

## 2024-08-09 PROCEDURE — 85027 COMPLETE CBC AUTOMATED: CPT

## 2024-08-09 PROCEDURE — 93005 ELECTROCARDIOGRAM TRACING: CPT | Performed by: THORACIC SURGERY (CARDIOTHORACIC VASCULAR SURGERY)

## 2024-08-09 PROCEDURE — 83050 HGB METHEMOGLOBIN QUAN: CPT

## 2024-08-09 PROCEDURE — 25010000002 ALBUMIN HUMAN 5% PER 50 ML

## 2024-08-09 PROCEDURE — 80053 COMPREHEN METABOLIC PANEL: CPT | Performed by: THORACIC SURGERY (CARDIOTHORACIC VASCULAR SURGERY)

## 2024-08-09 DEVICE — SEALANT HEMO SURG COSEAL PREMIX 4ML: Type: IMPLANTABLE DEVICE | Site: CHEST | Status: FUNCTIONAL

## 2024-08-09 DEVICE — VLV AORTA INSPRIS RESILIA 23MM: Type: IMPLANTABLE DEVICE | Site: HEART | Status: FUNCTIONAL

## 2024-08-09 DEVICE — LIGACLIP MCA MULTIPLE CLIP APPLIERS, 20 SMALL CLIPS
Type: IMPLANTABLE DEVICE | Site: CHEST | Status: FUNCTIONAL
Brand: LIGACLIP

## 2024-08-09 DEVICE — LIGACLIP MCA MULTIPLE CLIP APPLIERS, 20 LARGE CLIPS
Type: IMPLANTABLE DEVICE | Site: CHEST | Status: FUNCTIONAL
Brand: LIGACLIP

## 2024-08-09 DEVICE — ABSORBABLE HEMOSTAT (OXIDIZED REGENERATED CELLULOSE)
Type: IMPLANTABLE DEVICE | Site: CHEST | Status: FUNCTIONAL
Brand: SURGICEL

## 2024-08-09 DEVICE — SUT PACE TEMP/CARDIOL 2/0 SH SKS/3 DBL/ARM SS 24IN DK/BLU: Type: IMPLANTABLE DEVICE | Site: CHEST | Status: FUNCTIONAL

## 2024-08-09 DEVICE — PLEDGET INCISIONLINE REINF TFE SFT PTFE 1.5X4.8X9.5MM WHT: Type: IMPLANTABLE DEVICE | Site: CHEST | Status: FUNCTIONAL

## 2024-08-09 RX ORDER — DEXMEDETOMIDINE HYDROCHLORIDE 4 UG/ML
INJECTION, SOLUTION INTRAVENOUS CONTINUOUS PRN
Status: DISCONTINUED | OUTPATIENT
Start: 2024-08-09 | End: 2024-08-09 | Stop reason: SURG

## 2024-08-09 RX ORDER — ETOMIDATE 2 MG/ML
INJECTION INTRAVENOUS AS NEEDED
Status: DISCONTINUED | OUTPATIENT
Start: 2024-08-09 | End: 2024-08-09 | Stop reason: SURG

## 2024-08-09 RX ORDER — ALBUMIN (HUMAN) 12.5 G/50ML
12.5 SOLUTION INTRAVENOUS AS NEEDED
Status: DISCONTINUED | OUTPATIENT
Start: 2024-08-09 | End: 2024-08-15 | Stop reason: HOSPADM

## 2024-08-09 RX ORDER — ALBUMIN, HUMAN INJ 5% 5 %
SOLUTION INTRAVENOUS CONTINUOUS PRN
Status: DISCONTINUED | OUTPATIENT
Start: 2024-08-09 | End: 2024-08-09 | Stop reason: SURG

## 2024-08-09 RX ORDER — SODIUM CHLORIDE 9 MG/ML
INJECTION, SOLUTION INTRAVENOUS AS NEEDED
Status: DISCONTINUED | OUTPATIENT
Start: 2024-08-09 | End: 2024-08-09 | Stop reason: HOSPADM

## 2024-08-09 RX ORDER — ACETAMINOPHEN 325 MG/1
650 TABLET ORAL EVERY 8 HOURS
Status: DISPENSED | OUTPATIENT
Start: 2024-08-09 | End: 2024-08-11

## 2024-08-09 RX ORDER — POTASSIUM CHLORIDE, DEXTROSE MONOHYDRATE 150; 5 MG/100ML; G/100ML
30 INJECTION, SOLUTION INTRAVENOUS CONTINUOUS
Status: DISCONTINUED | OUTPATIENT
Start: 2024-08-09 | End: 2024-08-09

## 2024-08-09 RX ORDER — FENTANYL CITRATE 0.05 MG/ML
INJECTION, SOLUTION INTRAMUSCULAR; INTRAVENOUS AS NEEDED
Status: DISCONTINUED | OUTPATIENT
Start: 2024-08-09 | End: 2024-08-09 | Stop reason: SURG

## 2024-08-09 RX ORDER — MILRINONE LACTATE 1 MG/ML
INJECTION, SOLUTION INTRAVENOUS AS NEEDED
Status: DISCONTINUED | OUTPATIENT
Start: 2024-08-09 | End: 2024-08-09 | Stop reason: SURG

## 2024-08-09 RX ORDER — FENTANYL CITRATE 50 UG/ML
25 INJECTION, SOLUTION INTRAMUSCULAR; INTRAVENOUS
Status: DISCONTINUED | OUTPATIENT
Start: 2024-08-09 | End: 2024-08-10

## 2024-08-09 RX ORDER — ACETAMINOPHEN 325 MG/1
650 TABLET ORAL EVERY 4 HOURS PRN
Status: DISCONTINUED | OUTPATIENT
Start: 2024-08-10 | End: 2024-08-15 | Stop reason: HOSPADM

## 2024-08-09 RX ORDER — CEFAZOLIN SODIUM 1 G/3ML
INJECTION, POWDER, FOR SOLUTION INTRAMUSCULAR; INTRAVENOUS AS NEEDED
Status: DISCONTINUED | OUTPATIENT
Start: 2024-08-09 | End: 2024-08-09 | Stop reason: SURG

## 2024-08-09 RX ORDER — POTASSIUM CHLORIDE, DEXTROSE MONOHYDRATE 150; 5 MG/100ML; G/100ML
50 INJECTION, SOLUTION INTRAVENOUS CONTINUOUS
Status: DISCONTINUED | OUTPATIENT
Start: 2024-08-09 | End: 2024-08-13

## 2024-08-09 RX ORDER — ALBUTEROL SULFATE 0.83 MG/ML
2.5 SOLUTION RESPIRATORY (INHALATION) EVERY 4 HOURS PRN
Status: ACTIVE | OUTPATIENT
Start: 2024-08-09 | End: 2024-08-10

## 2024-08-09 RX ORDER — LIDOCAINE HYDROCHLORIDE 10 MG/ML
INJECTION, SOLUTION EPIDURAL; INFILTRATION; INTRACAUDAL; PERINEURAL AS NEEDED
Status: DISCONTINUED | OUTPATIENT
Start: 2024-08-09 | End: 2024-08-09 | Stop reason: SURG

## 2024-08-09 RX ORDER — DEXTROSE MONOHYDRATE 25 G/50ML
10-50 INJECTION, SOLUTION INTRAVENOUS
Status: DISCONTINUED | OUTPATIENT
Start: 2024-08-09 | End: 2024-08-11

## 2024-08-09 RX ORDER — ACETAMINOPHEN 650 MG/1
650 SUPPOSITORY RECTAL EVERY 8 HOURS
Status: DISPENSED | OUTPATIENT
Start: 2024-08-09 | End: 2024-08-11

## 2024-08-09 RX ORDER — ACETAMINOPHEN 160 MG/5ML
650 SOLUTION ORAL EVERY 8 HOURS
Status: DISPENSED | OUTPATIENT
Start: 2024-08-09 | End: 2024-08-11

## 2024-08-09 RX ORDER — ACETAMINOPHEN 160 MG/5ML
650 SOLUTION ORAL EVERY 4 HOURS PRN
Status: DISCONTINUED | OUTPATIENT
Start: 2024-08-10 | End: 2024-08-15 | Stop reason: HOSPADM

## 2024-08-09 RX ORDER — IBUPROFEN 600 MG/1
1 TABLET ORAL
Status: DISCONTINUED | OUTPATIENT
Start: 2024-08-09 | End: 2024-08-11

## 2024-08-09 RX ORDER — CALCIUM CHLORIDE 100 MG/ML
INJECTION INTRAVENOUS; INTRAVENTRICULAR AS NEEDED
Status: DISCONTINUED | OUTPATIENT
Start: 2024-08-09 | End: 2024-08-09 | Stop reason: SURG

## 2024-08-09 RX ORDER — NOREPINEPHRINE BITARTRATE 0.03 MG/ML
.02-.3 INJECTION, SOLUTION INTRAVENOUS
Status: DISCONTINUED | OUTPATIENT
Start: 2024-08-09 | End: 2024-08-13

## 2024-08-09 RX ORDER — MIDAZOLAM HYDROCHLORIDE 1 MG/ML
0.5 INJECTION INTRAMUSCULAR; INTRAVENOUS
Status: DISCONTINUED | OUTPATIENT
Start: 2024-08-09 | End: 2024-08-09 | Stop reason: HOSPADM

## 2024-08-09 RX ORDER — NITROGLYCERIN 0.4 MG/1
0.4 TABLET SUBLINGUAL
Status: DISCONTINUED | OUTPATIENT
Start: 2024-08-09 | End: 2024-08-15 | Stop reason: HOSPADM

## 2024-08-09 RX ORDER — LIDOCAINE HYDROCHLORIDE 10 MG/ML
0.5 INJECTION, SOLUTION EPIDURAL; INFILTRATION; INTRACAUDAL; PERINEURAL ONCE AS NEEDED
Status: DISCONTINUED | OUTPATIENT
Start: 2024-08-09 | End: 2024-08-09 | Stop reason: HOSPADM

## 2024-08-09 RX ORDER — EPINEPHRINE 1 MG/ML
INJECTION, SOLUTION, CONCENTRATE INTRAVENOUS AS NEEDED
Status: DISCONTINUED | OUTPATIENT
Start: 2024-08-09 | End: 2024-08-09

## 2024-08-09 RX ORDER — HEPARIN SODIUM 1000 [USP'U]/ML
INJECTION, SOLUTION INTRAVENOUS; SUBCUTANEOUS AS NEEDED
Status: DISCONTINUED | OUTPATIENT
Start: 2024-08-09 | End: 2024-08-09 | Stop reason: SURG

## 2024-08-09 RX ORDER — AMINOCAPROIC ACID 250 MG/ML
INJECTION, SOLUTION INTRAVENOUS AS NEEDED
Status: DISCONTINUED | OUTPATIENT
Start: 2024-08-09 | End: 2024-08-09 | Stop reason: SURG

## 2024-08-09 RX ORDER — ALBUMIN, HUMAN INJ 5% 5 %
SOLUTION INTRAVENOUS
Status: COMPLETED
Start: 2024-08-09 | End: 2024-08-09

## 2024-08-09 RX ORDER — SODIUM CHLORIDE 9 MG/ML
9 INJECTION, SOLUTION INTRAVENOUS CONTINUOUS PRN
Status: DISCONTINUED | OUTPATIENT
Start: 2024-08-09 | End: 2024-08-09 | Stop reason: HOSPADM

## 2024-08-09 RX ORDER — MORPHINE SULFATE 2 MG/ML
2 INJECTION, SOLUTION INTRAMUSCULAR; INTRAVENOUS
Status: DISCONTINUED | OUTPATIENT
Start: 2024-08-09 | End: 2024-08-14

## 2024-08-09 RX ORDER — OXYCODONE HYDROCHLORIDE 5 MG/1
5 TABLET ORAL EVERY 4 HOURS PRN
Status: DISCONTINUED | OUTPATIENT
Start: 2024-08-09 | End: 2024-08-15 | Stop reason: HOSPADM

## 2024-08-09 RX ORDER — ACETAMINOPHEN 650 MG/1
650 SUPPOSITORY RECTAL EVERY 4 HOURS PRN
Status: DISCONTINUED | OUTPATIENT
Start: 2024-08-10 | End: 2024-08-15 | Stop reason: HOSPADM

## 2024-08-09 RX ORDER — GABAPENTIN 300 MG/1
300 CAPSULE ORAL ONCE
Status: COMPLETED | OUTPATIENT
Start: 2024-08-09 | End: 2024-08-09

## 2024-08-09 RX ORDER — MIDAZOLAM HYDROCHLORIDE 1 MG/ML
INJECTION INTRAMUSCULAR; INTRAVENOUS AS NEEDED
Status: DISCONTINUED | OUTPATIENT
Start: 2024-08-09 | End: 2024-08-09 | Stop reason: SURG

## 2024-08-09 RX ORDER — ONDANSETRON 2 MG/ML
4 INJECTION INTRAMUSCULAR; INTRAVENOUS EVERY 6 HOURS PRN
Status: DISCONTINUED | OUTPATIENT
Start: 2024-08-09 | End: 2024-08-15 | Stop reason: HOSPADM

## 2024-08-09 RX ORDER — SODIUM CHLORIDE 0.9 % (FLUSH) 0.9 %
10 SYRINGE (ML) INJECTION EVERY 12 HOURS SCHEDULED
Status: DISCONTINUED | OUTPATIENT
Start: 2024-08-09 | End: 2024-08-09 | Stop reason: HOSPADM

## 2024-08-09 RX ORDER — ROCURONIUM BROMIDE 10 MG/ML
INJECTION, SOLUTION INTRAVENOUS AS NEEDED
Status: DISCONTINUED | OUTPATIENT
Start: 2024-08-09 | End: 2024-08-09 | Stop reason: SURG

## 2024-08-09 RX ORDER — SODIUM CHLORIDE 9 MG/ML
INJECTION, SOLUTION INTRAVENOUS CONTINUOUS PRN
Status: DISCONTINUED | OUTPATIENT
Start: 2024-08-09 | End: 2024-08-09 | Stop reason: SURG

## 2024-08-09 RX ORDER — FAMOTIDINE 20 MG/1
20 TABLET, FILM COATED ORAL ONCE
Status: COMPLETED | OUTPATIENT
Start: 2024-08-09 | End: 2024-08-09

## 2024-08-09 RX ORDER — NOREPINEPHRINE BITARTRATE 0.03 MG/ML
INJECTION, SOLUTION INTRAVENOUS CONTINUOUS PRN
Status: DISCONTINUED | OUTPATIENT
Start: 2024-08-09 | End: 2024-08-09 | Stop reason: SURG

## 2024-08-09 RX ORDER — AMOXICILLIN 250 MG
2 CAPSULE ORAL NIGHTLY
Status: DISCONTINUED | OUTPATIENT
Start: 2024-08-10 | End: 2024-08-15 | Stop reason: HOSPADM

## 2024-08-09 RX ORDER — PAPAVERINE HYDROCHLORIDE 30 MG/ML
INJECTION INTRAMUSCULAR; INTRAVENOUS AS NEEDED
Status: DISCONTINUED | OUTPATIENT
Start: 2024-08-09 | End: 2024-08-09 | Stop reason: HOSPADM

## 2024-08-09 RX ORDER — PROTAMINE SULFATE 10 MG/ML
INJECTION, SOLUTION INTRAVENOUS AS NEEDED
Status: DISCONTINUED | OUTPATIENT
Start: 2024-08-09 | End: 2024-08-09 | Stop reason: SURG

## 2024-08-09 RX ORDER — NALOXONE HCL 0.4 MG/ML
0.4 VIAL (ML) INJECTION
Status: DISCONTINUED | OUTPATIENT
Start: 2024-08-09 | End: 2024-08-10

## 2024-08-09 RX ORDER — NICOTINE POLACRILEX 4 MG
15 LOZENGE BUCCAL
Status: DISCONTINUED | OUTPATIENT
Start: 2024-08-09 | End: 2024-08-11

## 2024-08-09 RX ORDER — CHLORHEXIDINE GLUCONATE ORAL RINSE 1.2 MG/ML
15 SOLUTION DENTAL EVERY 12 HOURS SCHEDULED
Status: COMPLETED | OUTPATIENT
Start: 2024-08-09 | End: 2024-08-11

## 2024-08-09 RX ADMIN — PROPOFOL 25 MCG/KG/MIN: 10 INJECTION, EMULSION INTRAVENOUS at 18:42

## 2024-08-09 RX ADMIN — NOREPINEPHRINE BITARTRATE 0.01 MCG/KG/MIN: 0.03 INJECTION, SOLUTION INTRAVENOUS at 12:28

## 2024-08-09 RX ADMIN — FENTANYL CITRATE 250 MCG: 0.05 INJECTION, SOLUTION INTRAMUSCULAR; INTRAVENOUS at 13:15

## 2024-08-09 RX ADMIN — FENTANYL CITRATE 500 MCG: 0.05 INJECTION, SOLUTION INTRAMUSCULAR; INTRAVENOUS at 11:59

## 2024-08-09 RX ADMIN — CEFAZOLIN 2 G: 1 INJECTION, POWDER, FOR SOLUTION INTRAMUSCULAR; INTRAVENOUS at 16:16

## 2024-08-09 RX ADMIN — GABAPENTIN 300 MG: 300 CAPSULE ORAL at 10:18

## 2024-08-09 RX ADMIN — MIDAZOLAM 2 MG: 1 INJECTION INTRAMUSCULAR; INTRAVENOUS at 11:56

## 2024-08-09 RX ADMIN — 0.12% CHLORHEXIDINE GLUCONATE 15 ML: 1.2 RINSE ORAL at 08:09

## 2024-08-09 RX ADMIN — ROCURONIUM BROMIDE 30 MG: 10 INJECTION INTRAVENOUS at 15:48

## 2024-08-09 RX ADMIN — ALBUMIN (HUMAN): 12.5 INJECTION, SOLUTION INTRAVENOUS at 17:14

## 2024-08-09 RX ADMIN — SODIUM CHLORIDE 2000 MG: 900 INJECTION INTRAVENOUS at 12:05

## 2024-08-09 RX ADMIN — OXYCODONE HYDROCHLORIDE 5 MG: 10 TABLET ORAL at 21:37

## 2024-08-09 RX ADMIN — INSULIN HUMAN 2.2 UNITS/HR: 1 INJECTION, SOLUTION INTRAVENOUS at 18:36

## 2024-08-09 RX ADMIN — SODIUM BICARBONATE 50 MEQ: 84 INJECTION INTRAVENOUS at 19:04

## 2024-08-09 RX ADMIN — NOREPINEPHRINE BITARTRATE 0.01 MCG/KG/MIN: 0.03 INJECTION, SOLUTION INTRAVENOUS at 12:24

## 2024-08-09 RX ADMIN — ETOMIDATE 20 MG: 40 INJECTION, SOLUTION INTRAVENOUS at 11:59

## 2024-08-09 RX ADMIN — CHLORHEXIDINE GLUCONATE, 0.12% ORAL RINSE 15 ML: 1.2 SOLUTION DENTAL at 21:37

## 2024-08-09 RX ADMIN — MILRINONE LACTATE 2.5 MG: 1 INJECTION, SOLUTION INTRAVENOUS at 15:36

## 2024-08-09 RX ADMIN — SODIUM CHLORIDE: 9 INJECTION, SOLUTION INTRAVENOUS at 12:10

## 2024-08-09 RX ADMIN — FENTANYL CITRATE 250 MCG: 0.05 INJECTION, SOLUTION INTRAMUSCULAR; INTRAVENOUS at 12:19

## 2024-08-09 RX ADMIN — ROCURONIUM BROMIDE 30 MG: 10 INJECTION INTRAVENOUS at 13:09

## 2024-08-09 RX ADMIN — AMINOCAPROIC ACID 10 G: 250 INJECTION, SOLUTION INTRAVENOUS at 16:22

## 2024-08-09 RX ADMIN — PROTAMINE SULFATE 300 MG: 10 INJECTION, SOLUTION INTRAVENOUS at 16:22

## 2024-08-09 RX ADMIN — CARVEDILOL 3.12 MG: 3.12 TABLET, FILM COATED ORAL at 08:09

## 2024-08-09 RX ADMIN — FAMOTIDINE 20 MG: 20 TABLET, FILM COATED ORAL at 10:18

## 2024-08-09 RX ADMIN — CALCIUM CHLORIDE 0.6 G: 100 INJECTION INTRAVENOUS; INTRAVENTRICULAR at 16:22

## 2024-08-09 RX ADMIN — Medication 10 ML: at 08:08

## 2024-08-09 RX ADMIN — ALBUMIN (HUMAN) 12.5 G: 0.25 INJECTION, SOLUTION INTRAVENOUS at 22:52

## 2024-08-09 RX ADMIN — MIDAZOLAM 3 MG: 1 INJECTION INTRAMUSCULAR; INTRAVENOUS at 13:09

## 2024-08-09 RX ADMIN — HEPARIN SODIUM 30000 UNITS: 1000 INJECTION INTRAVENOUS; SUBCUTANEOUS at 13:09

## 2024-08-09 RX ADMIN — AMINOCAPROIC ACID 10 G: 250 INJECTION, SOLUTION INTRAVENOUS at 13:09

## 2024-08-09 RX ADMIN — ALBUMIN (HUMAN): 12.5 INJECTION, SOLUTION INTRAVENOUS at 16:49

## 2024-08-09 RX ADMIN — EPINEPHRINE 0.01 MCG/KG/MIN: 1 INJECTION, SOLUTION, CONCENTRATE INTRAVENOUS at 15:56

## 2024-08-09 RX ADMIN — SODIUM BICARBONATE 25 MEQ: 84 INJECTION INTRAVENOUS at 17:14

## 2024-08-09 RX ADMIN — POTASSIUM CHLORIDE AND DEXTROSE MONOHYDRATE 50 ML/HR: 150; 5 INJECTION, SOLUTION INTRAVENOUS at 18:37

## 2024-08-09 RX ADMIN — DEXMEDETOMIDINE HYDROCHLORIDE 0.2 MCG/KG/HR: 4 INJECTION, SOLUTION INTRAVENOUS at 12:30

## 2024-08-09 RX ADMIN — ALBUMIN (HUMAN): 12.5 INJECTION, SOLUTION INTRAVENOUS at 17:01

## 2024-08-09 RX ADMIN — ALBUMIN (HUMAN) 12.5 G: 12.5 INJECTION, SOLUTION INTRAVENOUS at 19:25

## 2024-08-09 RX ADMIN — MUPIROCIN 1 APPLICATION: 20 OINTMENT TOPICAL at 08:09

## 2024-08-09 RX ADMIN — LIDOCAINE HYDROCHLORIDE 70 MG: 10 INJECTION, SOLUTION EPIDURAL; INFILTRATION; INTRACAUDAL; PERINEURAL at 11:59

## 2024-08-09 NOTE — STS RISK SCORE
Procedure Type: CABG + AVR  Perioperative Outcome Estimate %  Operative Mortality 2.55%  Morbidity & Mortality 14.8%  Stroke 2.09%  Renal Failure 1.81%  Reoperation 5.67%  Prolonged Ventilation 9.48%  Deep Sternal Wound Infection 0.13%  Long Hospital Stay (>14 days) 9.59%  Short Hospital Stay (<6 days) 30.9%

## 2024-08-09 NOTE — PROGRESS NOTES
"  [facility] at [facility]   Inpatient Progress Note       LOS: 2 days   Patient Care Team:  Edgard Romero MD as PCP - General (Family Medicine)    Chief Complaint: Follow-up for NSTEMI    Subjective     Interval History:     Patient in bed. Notes typical postoperative discomfort.  Feels swollen and distended in his abdomen.    Review of Systems:   Pertinent positives noted in history, exam, and assessment. Otherwise reviewed and negative.      Objective     Vitals:  Blood pressure 112/70, pulse 92, temperature 98.1 °F (36.7 °C), temperature source Oral, resp. rate 18, height 172.7 cm (67.99\"), weight 75.7 kg (166 lb 14.4 oz), SpO2 95%.     Intake/Output Summary (Last 24 hours) at 8/9/2024 0829  Last data filed at 8/9/2024 0121  Gross per 24 hour   Intake --   Output 450 ml   Net -450 ml     Vitals reviewed.   Constitutional:       Appearance: Well-developed and not in distress.   Neck:      Vascular: No JVD.      Trachea: No tracheal deviation.   Pulmonary:      Effort: Pulmonary effort is normal.      Breath sounds: No rales.      Comments: Decreased bilaterally  Cardiovascular:      Normal rate. Regular rhythm.      Murmurs: There is no murmur.      Comments: Right radial access site benign  Pulses:     Intact distal pulses.   Edema:     Peripheral edema present.     Pretibial: 1+ edema of the left pretibial area and trace edema of the right pretibial area.     Ankle: 1+ edema of the left ankle and trace edema of the right ankle.  Musculoskeletal:         General: No deformity. Skin:     General: Skin is warm and dry.   Neurological:      Mental Status: Alert and oriented to person, place, and time.            Results Review:     I reviewed the patient's new clinical results.    Results from last 7 days   Lab Units 08/09/24  0817   WBC 10*3/mm3 7.67   HEMOGLOBIN g/dL 12.7*   HEMATOCRIT % 38.0   PLATELETS 10*3/mm3 230     Results from last 7 days   Lab Units 08/08/24  0325   SODIUM mmol/L 137 "   POTASSIUM mmol/L 4.0   CHLORIDE mmol/L 105   CO2 mmol/L 22.0   BUN mg/dL 28*   CREATININE mg/dL 1.19   CALCIUM mg/dL 8.8   GLUCOSE mg/dL 102*     Results from last 7 days   Lab Units 08/08/24  0325   SODIUM mmol/L 137   POTASSIUM mmol/L 4.0   CHLORIDE mmol/L 105   CO2 mmol/L 22.0   BUN mg/dL 28*   CREATININE mg/dL 1.19   GLUCOSE mg/dL 102*   CALCIUM mg/dL 8.8     Results from last 7 days   Lab Units 08/07/24  2034   INR  1.28*     Lab Results   Lab Value Date/Time    TROPONINT 827 (C) 08/08/2024 0826    TROPONINT 850 (C) 08/08/2024 0325     Results from last 7 days   Lab Units 08/08/24  0826 08/08/24  0325   TSH uIU/mL  --  4.230*   FREE T4 ng/dL 1.64  --      Results from last 7 days   Lab Units 08/08/24  0325   CHOLESTEROL mg/dL 205*   TRIGLYCERIDES mg/dL 163*   HDL CHOL mg/dL 33*   LDL CHOL mg/dL 142*         Results from last 7 days   Lab Units 08/08/24  0826 08/08/24  0325   HSTROP T ng/L 827* 850*     LHC:    Multivessel coronary artery disease.    90% ulcerated proximal LAD stenosis, involves origin of moderate-sized diagonal    80% proximal left circumflex stenosis.    Small subtotally occluded OM1    Chronically occluded proximal RCA with 3+ collaterals to the large right PDA    LVEF 30%    Likely critical aortic stenosis with peak to peak gradient of 46 mmHg.  In the setting of severe LV dysfunction this is likely critical aortic stenosis    Elevated LVEDP of 38    Tele:  SR        Assessment:      NSTEMI (non-ST elevated myocardial infarction)    MV CAD    Ischemic cardiomyopathy    Severe aortic stenosis    Bilateral carotid artery stenosis (>70%)    Former smoker    Dyslipidemia      Non-STEMI.  LHC 8/7/2024 MV CAD  CABG 8/9/24 Dr. Paige LOCKHART to LAD and VG to diag and OM sequentially  Ischemic cardiomyopathy  EF 30% by LV gram  BB held due to marginal BP  Critical aortic stenosis  Peak gradient 46 mmHg in setting of severe LV dysfunction.  AVR with 23 mm Bovine Tapia  Dyslipidemia  Carotid artery  disease  Bilateral >70% ICA disease  Acute HFrEF  Remote tobacco use resolved 14 years ago  COPD  Elevated TSH    Plan:  Patient overall CV stable.  Will give Bumex 2 mg IV x 1 today.    Continue other current cardiac medications.  Increase Coreg to 6.25 mg twice daily.  If blood pressure tolerates consider adding Entresto in the near term.  Continue to monitor rhythm.  Cardiology will continue to follow.    DESEAN Ferguson   Dictated utilizing Dragon dictation

## 2024-08-09 NOTE — ANESTHESIA POSTPROCEDURE EVALUATION
Patient: Preet Edwards    Procedure Summary       Date: 08/09/24 Room / Location:  [facility]  [facility]    Anesthesia Start: 1150 Anesthesia Stop: 1747    Procedure: CORONARY ARTERY BYPASS X 3 WITH INTERNAL MAMMARY ARTERY GRAFT AND AORTIC VALVE REPAIR/REPLACEMENT, SINDY, EVH (Chest) Diagnosis:       NSTEMI (non-ST elevated myocardial infarction)      Coronary artery disease involving native heart, unspecified vessel or lesion type, unspecified whether angina present      (NSTEMI (non-ST elevated myocardial infarction) [I21.4])      (Coronary artery disease involving native heart, unspecified vessel or lesion type, unspecified whether angina present [I25.10])    Surgeons: Gage Gibson MD Provider: Minoo Martinez DO    Anesthesia Type: general, [place], CVL ASA Status: 4            Anesthesia Type: general, [place], CVL    Vitals  Vitals Value Taken Time   /53 08/09/24 1740   Temp     Pulse 84 08/09/24 1747   Resp     SpO2 100 % 08/09/24 1747   Vitals shown include unfiled device data.        Post Anesthesia Care and Evaluation    Patient location during evaluation: ICU  Patient participation: complete - patient cannot participate  Level of consciousness: obtunded/minimal responses  Pain management: adequate    Airway patency: patent  Anesthetic complications: No anesthetic complications  PONV Status: none  Cardiovascular status: acceptable and hemodynamically stable  Respiratory status: acceptable, ETT, ventilator, intubated and airway suctioned  Hydration status: acceptable    Comments: Transported to ICU intubated and sedated on BMV and full monitors, pt remained hemodynamically stable throughout.  Report given to ICU team at bedside.  Epi, norepi, precedex gtt continued.

## 2024-08-09 NOTE — CASE MANAGEMENT/SOCIAL WORK
Continued Stay Note   Kashif     Patient Name: Preet Edwards  MRN: [id number]  Today's Date: 8/9/2024    Admit Date: 8/7/2024    Plan: Home   Discharge Plan       Row Name 08/09/24 1208       Plan    Plan Home    Patient/Family in Agreement with Plan yes    Plan Comments Patient having CABG today and will tranfer to the ICU.  Patient discharge goal is home with family. CM will follow for any d/c needs.    Final Discharge Disposition Code 01 - home or self-care                   Discharge Codes    No documentation.                       Nelda Goldman RN

## 2024-08-09 NOTE — OP NOTE
CORONARY ARTERY BYPASS WITH INTERNAL MAMMARY ARTERY GRAFT AND AORTIC VALVE REPAIR/REPLACEMENT  Procedure Report    Patient Name:  Preet Edwards  YOB: 1955    Date of Surgery:  8/9/2024     Indications:  NSTEMI, Severe Aortic Stenosis    Pre-op Diagnosis:   NSTEMI (non-ST elevated myocardial infarction) [I21.4]  Coronary artery disease involving native heart, unspecified vessel or lesion type, unspecified whether angina present [I25.10]       Post-Op Diagnosis Codes:     * NSTEMI (non-ST elevated myocardial infarction) [I21.4]     * Coronary artery disease involving native heart, unspecified vessel or lesion type, unspecified whether angina present [I25.10]    Procedure/CPT® Codes:      Procedure(s):  CORONARY ARTERY BYPASS X 3 WITH INTERNAL MAMMARY ARTERY GRAFT  to Left Anterior Descending Artery, SVG to Diagonal and Obtuse Marginal AND AORTIC VALVE REPAIR/REPLACEMENT, 23mm Bovine Tapia valve SINDY, EVH              Staff:  Surgeon(s):  Gage Gibson MD PA Lori [place]el[place] 1st Assisted with EVH, and retracting and suctioning    Anesthesia: General    Estimated Blood Loss: 200ml    Implants:    Implant Name Type Inv. Item Serial No.  Lot No. LRB No. Used Action   CLIPAPPLR M/ ENDO LIGACLIP 9 3/8IN SM - [id number] Implant CLIPAPPLR M/ ENDO LIGACLIP 9 3/8IN SM  [employer] END[employer]  DIV OF J AND J  N/A 1 Implanted   CLIPAPPLR M/ ENDO LIGACLIP 13IN LG - [id number] Implant CLIPAPPLR M/ ENDO LIGACLIP 13IN LG  [employer] END[employer]  DIV OF J AND J [id number] N/A 1 Implanted   VLV AORTA INSPRIS RESILIA 23MM - [id number] - [id number] Implant VLV AORTA INSPRIS RESILIA 23MM [id number] Audible [employer] SIE[employer]  N/A 1 Implanted   SEALANT HEMO SURG COSEAL PREMIX 4ML - [id number] Implant SEALANT HEMO SURG COSEAL PREMIX 4ML  [facility] MEDICAL [id number] N/A 1 Implanted   SUT PACE TEMP/CARDIOL 2/0 SH SKS/3 DBL/ARM SS 24IN DK/AD - [id number] Implant SUT PACE TEMP/CARDIOL 2/0 SH SKS/3 DBL/ARM SS 24IN DK/AD   [employer]  DIV OF FREDDY AND J [id number] N/A 2 Implanted   PLEDGET INCISIONLINE REINF TFE SFT PTFE 1.5X4.8X9.5MM WHT - [id number] Implant PLEDGET INCISIONLINE REINF TFE SFT PTFE 1.5X4.8X9.5MM WHT  [employer]  DIV OF J AND J [id number] N/A 1 Implanted   HEMOST ABS SURGICEL ORIG 4X8IN STRL - [id number] Implant HEMOST ABS SURGICEL ORIG 4X8IN STRL  [employer]  DIV OF J AND J [id number] N/A 1 Implanted       Specimen:          Specimens       ID Source Type Tests Collected By Collected At Frozen?    A Aortic valve Tissue TISSUE PATHOLOGY EXAM   Gage Gibson MD 8/9/24 4786                 Findings: Trileaflet aortic valve stenosis, severe emphysema, PDL calcified not suitable for bypass, SHOSHANA good conduit but adherent to upper chest probably related to prior infection -it had good flow and the anastomosis went well    Complications: none    Description of Procedure: After gen anesthesia and SINDY perfomed the PA performed EVH and I performed a median sternotomy. Shoshana was harvested and the findings were as mentioned. After heparinization the cannulas were placed and CPB begun. After the heart was arrested with [place] solution the SVG was anastomosed to the OM using 7-0 proline suture, then the diagonal. A tangential aortotomy was performed, the aortic valve removed, and sized for a 23mm valve. 2-0 pledgeted ethibond suture used circumferentially then place through the washed valve, then seated nicely and tied in. The aortotomy was closed with 4-0 proline, then the SHOSHANA was sutured to the mid-portion of the LAD, a 1.5mm vessel. Two proximals were performed as we rewarmed, using 4-0 proline and 4.0mm punch. The heart was de-aired and NSR maintained. We were thern weaned off CPB the cannulas were removed and protamine given. Sternum closed with wires and wound in three layers.                      Gage Gibson MD     Date: 8/9/2024  Time: 17:51 EDT

## 2024-08-09 NOTE — PROGRESS NOTES
HEPARIN INFUSION  Preet Edwards is a  68 y.o. male receiving heparin infusion.     Therapy for (VTE/Cardiac):   cardiac  Patient Weight: 76.4 kg  Initial Bolus (Y/N):   n (5000 units in cath lab)  Any Bolus (Y/N):   y        Signs or Symptoms of Bleeding: none noted    Cardiac or Other (Not VTE)   Initial Bolus: 60 units/kg (Max 4,000 units)  Initial rate: 12 units/kg/hr (Max 1,000 units/hr)   Anti Xa Rebolus Infusion Hold time Change infusion Dose (Units/kg/hr) Next Anti Xa or aPTT Level Due   < 0.11 50 Units/kg  (4000 Units Max) None Increase by  3 Units/kg/hr 6 hours   0.11- 0.19 25 Units/kg  (2000 Units Max) None Increase by  2 Units/kg/hr 6 hours   0.2 - 0.29 0 None Increase by  1 Units/kg/hr 6 hours   0.3 - 0.5 0 None No Change 6 hours (after 2 consecutive levels in range check qAM)   0.51 - 0.6 0 None Decrease by  1 Units/kg/hr 6 hours   0.61 - 0.8 0 30 Minutes Decrease by  2 Units/kg/hr 6 hours   0.81 - 1 0 60 Minutes Decrease by  3 Units/kg/hr 6 hours   >1 0 Hold  After Anti Xa less than 0.5 decrease previous rate by  4 Units/kg/hr  Every 2 hours until Anti Xa  less than 0.5 then when infusion restarts in 6 hours     Recommend Xa every 6 hours.     Results from last 7 days   Lab Units 08/09/24  0817 08/08/24  0826 08/08/24  0325 08/07/24 2034   INR   --   --   --  1.28*   HEMOGLOBIN g/dL 12.7* 13.2 12.6* 12.7*   HEMATOCRIT % 38.0 39.4 37.5 38.1   PLATELETS 10*3/mm3 230 252 195 222          Date   Time   Anti-Xa Current Rate (Unit/kg/hr) Bolus   (Units) Rate Change   (Unit/kg/hr) New Rate (Unit/kg/hr) Next   Anti-Xa Comments  Pump Check Daily   8/7 2100 STAT -- 5000 in cath lab 12 12 0400 D/w RN   8/8 0515 0.1 12 3800 +3 15 1200 D/w RN, confirmed drip has been running   8/8 0951 0.4 15 -- -- 15 1600 [place] 3245   8/8 1527 0.19 15 1900 +2 17 2200 DW RN   8/8 2237 0.31 17 -- -- 17 0600 Eliana 3243 -acb   8/8 0818 0.35 17 -- -- 17 0600 [address] 3243                                                                                                                                                                         Anthony Pahram, LT[facility], located within [facility]  8/9/2024  09:50 EDT

## 2024-08-09 NOTE — ANESTHESIA PROCEDURE NOTES
Central Line      Patient reassessed immediately prior to procedure    Patient location during procedure: OR  Indications: vascular access  Staff  Anesthesiologist: Minoo Martinez DO  Preanesthetic Checklist  Completed: patient identified, IV checked, site marked, risks and benefits discussed, surgical consent, monitors and equipment checked, pre-op evaluation and timeout performed  Central Line Prep  Sterile Tech:cap, gloves, gown, mask and sterile barriers  Prep: chloraprep  Patient monitoring: blood pressure monitoring, continuous pulse oximetry and EKG  Central Line Procedure  Laterality:right  Location:internal jugular  Catheter Type:MAC  Catheter Size:9 Fr  Guidance:ultrasound guided  PROCEDURE NOTE/ULTRASOUND INTERPRETATION.  Using ultrasound guidance the potential vascular sites for insertion of the catheter were visualized to determine the patency of the vessel to be used for vascular access.  After selecting the appropriate site for insertion, the needle was visualized under ultrasound being inserted into the internal jugular vein, followed by ultrasound confirmation of wire and catheter placement. There were no abnormalities seen on ultrasound; an image was taken; and the patient tolerated the procedure with no complications. Images: still images not obtained  Assessment  Post procedure:biopatch applied, line sutured, occlusive dressing applied and secured with tape  Assessement:blood return through all ports, free fluid flow, chest x-ray ordered and Nic Test  Complications:no  Patient Tolerance:patient tolerated the procedure well with no apparent complications  Additional Notes  Smooth first pass US guided RIJMAC placement using sterile technique.  Negative nic x2. CXR to follow in ICU.

## 2024-08-09 NOTE — ANESTHESIA PREPROCEDURE EVALUATION
Anesthesia Evaluation     Patient summary reviewed and Nursing notes reviewed   no history of anesthetic complications:   NPO Solid Status: > 8 hours  NPO Liquid Status: > 2 hours           Airway   Mallampati: II  TM distance: >3 FB  Neck ROM: full  Dental    (+) edentulous    Pulmonary    (+) a smoker Former, COPD,decreased breath sounds  (-) sleep apnea, rhonchi, wheezes  Cardiovascular   Exercise tolerance: poor (<4 METS)    ECG reviewed  Patient on routine beta blocker and Beta blocker given within 24 hours of surgery  Rhythm: regular  Rate: normal    (+) valvular problems/murmurs AS, past MI , CAD, angina,  carotid artery disease  (-) dysrhythmias, cardiac stents    ROS comment: 8/9/24-  SALVADOR greater than 70% stenosis but less than near occlusion.  ·  LICA greater than 70% stenosis but less than near occlusion.    8/7/24-LVEF  EF = 35%   HoK --  mid anterior, apical anterior, apical lateral, apical septal and apex hypokinetic.   Nl RVSF/Sz.  3LL AV with trace AI, severe ASA,  VERNON  0.47 cm2. Pk flow distal 410.5 cm/s. Max pg 67.4 mmHg, mean 41.5 mmHg.  Mitral Valve -- no ms/trace to mild mr.      Mild tr.  Rvsp nl.  Mild pi. No dilation of the aortic root nor SoV.  pericardium is normal. There is no evidence of pericardial effusion.     8/7/24- ·  90% ulcerated proximal LAD stenosis, involves origin of moderate-sized diagonal.  ·  80% proximal left circumflex stenosis.   ·  Small subtotally occluded OM1  ·  Chronically occluded proximal RCA with 3+ collaterals to the large right PDA  ·  LVEF 30%.  ·  Likely critical aortic stenosis with peak to peak gradient of 46 mmHg.  In the setting of severe LV dysfunction this is likely critical aortic stenosis  ·  Elevated LVEDP of 38          Neuro/Psych  (-) seizures, CVA  GI/Hepatic/Renal/Endo    (+) GERD well controlled  (-) liver disease, no renal disease, diabetes, no thyroid disorder    Musculoskeletal     Abdominal    Substance History - negative use     OB/GYN           Other        ROS/Med Hx Other: Hgb 12.7 k 4.0 inr 1.28     No h/o pain or difficulty swallowing. No h/o esophageal nor gastric pathology nor procedures.       Phys Exam Other: beard            Anesthesia Plan    ASA 4     general, Wendy and CVL     (Risks and benefits of general anesthesia discussed with patient (including MI, CVA, death, recall, aspiration, oropharyngeal/dental damage), questions answered, agreeable to proceed.   Risks of SINDY discussed with patient (Op/dental damage, dysphagia, perforation,etc); questions answered, agreeable to proceed.     Discussed high risk related to AS/CAD/carotid stenosis of MACE including death; patient acknowledged this risk and desires to proceed.  )  intravenous induction   Postoperative Plan: Expected vent after surgery  Anesthetic plan, risks, benefits, and alternatives have been provided, discussed and informed consent has been obtained with: patient.    Use of blood products discussed with patient  Consented to blood products.      CODE STATUS:    Code Status (Patient has no pulse and is not breathing): CPR (Attempt to Resuscitate)  Medical Interventions (Patient has pulse or is breathing): Full Support

## 2024-08-09 NOTE — ANESTHESIA PROCEDURE NOTES
Intra-Op Anesthesia SINDY    Procedure Performed: Intra-Op Anesthesia SINDY       Start Time:        End Time:      Preanesthesia Checklist:  Patient identified, IV assessed, risks and benefits discussed, monitors and equipment assessed, procedure being performed at surgeon's request and anesthesia consent obtained.    General Procedure Information  Diagnostic Indications for Echo:  assessment of surgical repair and hemodynamic monitoring  Physician Requesting Echo: Gage Gibson MD  Location performed:  OR  Intubated  Bite block placed  Heart visualized  Probe Insertion:  Easy  Probe Type:  Multiplane  Modalities:  2D only, color flow mapping, continuous wave Doppler and pulse wave Doppler        Anesthesia Information  Performed Personally      Echocardiogram Comments:       Dx SINDY performed by Jovon GUZMAN in OR for CABG, AVR  Surgeon: Dr. Gibson    Preoperative informed consent obtained.  SINDY probe passed without difficulty.      Baseline exam: LVEF 25% by Simpsons method, global hypokinesis with LV smoke noted, Ant/AL/Lat hypokinesis; septal dyskinesis (TG view), distal inf/IS/Apical Akinesis.   RV normal sized with mild dysfunction .  No SAUL clot; +L to R shunt by PFO (0.46cm ).  Moderate central MR (VC 0.4), annulus 30mm.  Mild TR  Mild PI.  Trileaflet calcified AV with mild central AI, severe stenosis by DI (0.17); annulus 26mm,  mean gradient 24mmhg, Vmax 3.1 m/s.  No dissection noted in imaged aorta regions; nonmobile plaquing of descending aorta present (0.7 cm) and distal arch.   No significant effusions. Findings reviewed with surgeon in real time.    Post CPB:  S/p mvCABG +  23mm bAVR ;  Biventricular function stable on epi, norepi inotropic support; EF improved to 35-40% by visualization, still with some inferior and septal hypokinesis.   AV prosthesis appears well seated without rocking motion, leaflets opening well, small PVL near 3oclock position on final imaging, mean gradient 5mmhg;  no new  significant valvular abnormalities.  Aorta intact in visualized portions without evidence of dissection.  SINDY probe gently removed without blood present on probe.

## 2024-08-09 NOTE — ANESTHESIA PROCEDURE NOTES
Arterial Line      Patient reassessed immediately prior to procedure    Patient location during procedure: pre-op   Line placed for hemodynamic monitoring.  Performed By   Anesthesiologist: Minoo Martinez DO   Preanesthetic Checklist  Completed: patient identified, IV checked, site marked, risks and benefits discussed, surgical consent, monitors and equipment checked, pre-op evaluation and timeout performed  Arterial Line Prep    Sterile Tech: cap, gloves and sterile barriers  Prep: ChloraPrep  Patient monitoring: blood pressure monitoring, continuous pulse oximetry and EKG  Arterial Line Procedure   Laterality:right  Location:  radial artery  Catheter size: 20 G   Guidance: ultrasound guided and palpation technique  Number of attempts: 1  Successful placement: yes Images: still images not obtained  Post Assessment   Dressing Type: line sutured, occlusive dressing applied, secured with tape and wrist guard applied.   Complications no  Circ/Move/Sens Assessment: normal and unchanged.   Patient Tolerance: patient tolerated the procedure well with no apparent complications

## 2024-08-09 NOTE — PROGRESS NOTES
INTENSIVIST   PROGRESS NOTE     Hospital:  LOS: 2 days     Mr. Preet Edwards, 68 y.o. male is followed for a Chief Complaint of: Glycemic, Electrolyte, Respiratory, and Medical management        Subjective   S     Interval History:  Preet Edwards is a 68 y.o. male former smoker admitted to [facility] on 8/7/24 as a field STEMI from [facility] ED taken to the cath lab by Dr. Calix noted to have MV CAD, therefore surgical revascularization was recommended.     LHC displayed a 90% ulcerated proximal LAD stenosis, 80% proximal circumflex stenosis, subtotally occluded OM1,  of the RCA with collaterals, and a reduced EF of 30%.     Echo demonstrated an EF 35%, multi-segment hypokinesis, and severe aortic stenosis with max gradient 67. CTS was consulted and recommended surgical revascularization.    Carotid duplex observes >70% stenosis bilaterally.     Pre-op spirometry is pending at the time of dictation.     Time spent: 10 minutes  Electronically signed by DESEAN Hillman, 08/09/24, 7:23 AM EDT.       The patient's relevant past medical, surgical and social history were reviewed and updated in Epic as appropriate.      ROS: Unable to obtain secondary to sedation and mechanical ventilation.     Objective   O     Vitals:  Temp  Min: 97.4 °F (36.3 °C)  Max: 98.7 °F (37.1 °C)  BP  Min: 92/46  Max: 116/76  Pulse  Min: 87  Max: 97  Resp  Min: 18  Max: 18  SpO2  Min: 95 %  Max: 98 % No data recorded    Intake/Ouptut 24 hrs (7:00AM - 6:59 AM)  Intake & Output (last 3 days)         08/06 0701  08/07 0700 08/07 0701  08/08 0700 08/08 0701  08/09 0700 08/09 0701  08/10 0700    P.O.   240     Total Intake(mL/kg)   240 (3.2)     Urine (mL/kg/hr)   450 (0.2)     Total Output   450     Net   -210                     Medications (drips):  dextrose 5 % with KCl 20 mEq  EPINEPHrine  heparin, Last Rate: Stopped (08/09/24 0805)  insulin  norepinephrine  Pharmacy to Dose Heparin  propofol        Mechanical Ventilator Settings:          Resp  Rate (Set): 14  Pressure Support (cm H2O): 10 cm H20  FiO2 (%): 100 %  PEEP/CPAP (cm H2O): 5 cm H20    Minute Ventilation (L/min) (Obs): 9.94 L/min  Resp Rate (Observed) Vent: 14  I:E Ratio (Set): 1:2.64  I:E Ratio (Obs): 1:2.6    PIP Observed (cm H2O): 23 cm H2O  Plateau Pressure (cm H2O): 17 cm H2O    Physical Examination  Telemetry:  Normal sinus rhythm.    Constitutional:  No acute distress.  ETT in place on mechanical ventilation.    Cardiovascular: Normal rate, regular and rhythm. Normal heart sounds.  No murmurs, gallop or rub.   Respiratory: No respiratory distress. Normal respiratory effort.  Diminished.  Chest tubes with bloody drainage.    Abdominal:  Soft. No masses. Non-tender. No distension. No HSM.   Extremities: No digital cyanosis. No clubbing.  No peripheral edema.   Neurological:   Sedated.              Results from last 7 days   Lab Units 08/09/24  1723 08/09/24  1656 08/09/24  1626 08/09/24  1156 08/09/24  0817 08/08/24  0826 08/08/24  0325   WBC 10*3/mm3  --   --   --   --  7.67 7.94 6.85   HEMOGLOBIN g/dL  --   --   --   --  12.7* 13.2 12.6*   HEMOGLOBIN, POC g/dL 9.9* 8.5* 7.8*   < >  --   --   --    MCV fL  --   --   --   --  90.3 90.0 89.7   PLATELETS 10*3/mm3  --   --   --   --  230 252 195    < > = values in this interval not displayed.     Results from last 7 days   Lab Units 08/09/24  0817 08/08/24  0325   SODIUM mmol/L 141 137   POTASSIUM mmol/L 4.3 4.0   CO2 mmol/L 26.0 22.0   CREATININE mg/dL 1.40* 1.19   GLUCOSE mg/dL 119* 102*     Estimated Creatinine Clearance: 55.6 mL/min (A) (by C-G formula based on SCr of 1.4 mg/dL (H)).        Results from last 7 days   Lab Units 08/09/24  1813 08/09/24  1723 08/09/24  1656 08/09/24  1626 08/09/24  1559   PH, ARTERIAL pH units 7.386 7.36 7.27* 7.28* 7.32*   PCO2, ARTERIAL mm Hg 33.5*  --   --   --   --    PO2 ART mm Hg 395.0*  --   --   --   --    FIO2 % 100  --   --   --   --        Images:  Imaging Results (Last 24 Hours)       Procedure  Component Value Units Date/Time    XR Chest 1 View [[id number]] Resulted: 08/09/24 1831     Updated: 08/09/24 1831               Results: Reviewed.  I reviewed the patient's new laboratory and imaging results.  I independently reviewed the patient's new images.    Medications: Reviewed.    Assessment & Plan   A / P     Mr. Edwards is a 67yo M former smoker who was admitted to [facility] on 8/7/24 as a field STEMI and taken to the cath lab by Dr. Calix. He was found to have multi-vessel CAD and a CABG was recommended. Echocardiogram revealed severe aortic stenosis.     He underwent CABG x 3 and AVR with a bovine Tapia valve by Dr. Gibson on 8/9/24. He was admitted to the ICU on mechanical ventilation.     Nutrition:   Diet: Cardiac; Healthy Heart (2-3 Na+); Fluid Consistency: Thin (IDDSI 0)  Advance Directives:   Code Status and Medical Interventions: CPR (Attempt to Resuscitate); Full Support   Ordered at: 08/09/24 1804     Level Of Support Discussed With:    Patient     Code Status (Patient has no pulse and is not breathing):    CPR (Attempt to Resuscitate)     Medical Interventions (Patient has pulse or is breathing):    Full Support       Active Hospital Problems    Diagnosis     **NSTEMI (non-ST elevated myocardial infarction)     Ischemic cardiomyopathy     Severe aortic stenosis     Bilateral carotid artery stenosis (>70%)     Former smoker     Dyslipidemia     MV CAD        Assessment / Plan:    Continue mechanical ventilation. Will plan a delayed wean if he remains hemodynamically stable.   Titrate vasopressors.   Postoperative management per CTS  Insulin drip for hyperglycemia  Pain control/supportive care  AM labs and CXR    High risk secondary to management of mechanical ventilation and titration of vasopressors.     Plan of care and goals reviewed during interdisciplinary rounds.  I discussed the patient's findings and my recommendations with nursing staff      Greta Ruelas,     Intensive Care Medicine and  Pulmonary Medicine

## 2024-08-09 NOTE — ANESTHESIA PROCEDURE NOTES
Airway  Urgency: elective    Date/Time: 8/9/2024 12:01 PM  Airway not difficult    General Information and Staff    Patient location during procedure: OR  Anesthesiologist: Minoo Martinez DO    Indications and Patient Condition  Indications for airway management: airway protection    Preoxygenated: yes  MILS not maintained throughout  Mask difficulty assessment: 2 - vent by mask + OA or adjuvant +/- NMBA    Final Airway Details  Final airway type: endotracheal airway      Successful airway: ETT  Cuffed: yes   Successful intubation technique: direct laryngoscopy  Endotracheal tube insertion site: oral  Blade: Lety  Blade size: 4  ETT size (mm): 7.5  Cormack-Lehane Classification: grade I - full view of glottis  Placement verified by: chest auscultation and capnometry   Measured from: lips  ETT/EBT  to lips (cm): 22  Number of attempts at approach: 1  Assessment: lips, teeth, and gum same as pre-op and atraumatic intubation    Additional Comments  Negative epigastric sounds, Breath sound equal bilaterally with symmetric chest rise and fall

## 2024-08-09 NOTE — PLAN OF CARE
Goal Outcome Evaluation:  Plan of Care Reviewed With: patient        Progress: no change  Outcome Evaluation: Patient rested this shift. continues on heparin gtt per order. Denies pain. Vitals stable.

## 2024-08-10 ENCOUNTER — APPOINTMENT (OUTPATIENT)
Dept: GENERAL RADIOLOGY | Facility: HOSPITAL | Age: 69
End: 2024-08-10
Payer: COMMERCIAL

## 2024-08-10 LAB
ALBUMIN SERPL-MCNC: 4.2 G/DL (ref 3.5–5.2)
ALBUMIN SERPL-MCNC: 4.3 G/DL (ref 3.5–5.2)
ALBUMIN/GLOB SERPL: 3.1 G/DL
ALBUMIN/GLOB SERPL: 4.2 G/DL
ALP SERPL-CCNC: 20 U/L (ref 39–117)
ALP SERPL-CCNC: 22 U/L (ref 39–117)
ALT SERPL W P-5'-P-CCNC: 11 U/L (ref 1–41)
ALT SERPL W P-5'-P-CCNC: 14 U/L (ref 1–41)
ANION GAP SERPL CALCULATED.3IONS-SCNC: 10 MMOL/L (ref 5–15)
ANION GAP SERPL CALCULATED.3IONS-SCNC: 13 MMOL/L (ref 5–15)
ARTERIAL PATENCY WRIST A: ABNORMAL
AST SERPL-CCNC: 28 U/L (ref 1–40)
AST SERPL-CCNC: 36 U/L (ref 1–40)
ATMOSPHERIC PRESS: ABNORMAL MM[HG]
BASE EXCESS BLDA CALC-SCNC: -4.4 MMOL/L (ref 0–2)
BASOPHILS # BLD AUTO: 0.04 10*3/MM3 (ref 0–0.2)
BASOPHILS NFR BLD AUTO: 0.3 % (ref 0–1.5)
BDY SITE: ABNORMAL
BH BB BLOOD EXPIRATION DATE: NORMAL
BH BB BLOOD TYPE BARCODE: 6200
BH BB DISPENSE STATUS: NORMAL
BH BB PRODUCT CODE: NORMAL
BH BB UNIT NUMBER: NORMAL
BILIRUB SERPL-MCNC: 1.7 MG/DL (ref 0–1.2)
BILIRUB SERPL-MCNC: 2 MG/DL (ref 0–1.2)
BODY TEMPERATURE: 37
BUN SERPL-MCNC: 21 MG/DL (ref 8–23)
BUN SERPL-MCNC: 24 MG/DL (ref 8–23)
BUN/CREAT SERPL: 12.8 (ref 7–25)
BUN/CREAT SERPL: 13.5 (ref 7–25)
CALCIUM SPEC-SCNC: 7.9 MG/DL (ref 8.6–10.5)
CALCIUM SPEC-SCNC: 8.3 MG/DL (ref 8.6–10.5)
CHLORIDE SERPL-SCNC: 113 MMOL/L (ref 98–107)
CHLORIDE SERPL-SCNC: 113 MMOL/L (ref 98–107)
CO2 BLDA-SCNC: 22.4 MMOL/L (ref 22–33)
CO2 SERPL-SCNC: 21 MMOL/L (ref 22–29)
CO2 SERPL-SCNC: 21 MMOL/L (ref 22–29)
COHGB MFR BLD: 1.2 % (ref 0–2)
CREAT SERPL-MCNC: 1.56 MG/DL (ref 0.76–1.27)
CREAT SERPL-MCNC: 1.88 MG/DL (ref 0.76–1.27)
CROSSMATCH INTERPRETATION: NORMAL
DEPRECATED RDW RBC AUTO: 41.7 FL (ref 37–54)
DEPRECATED RDW RBC AUTO: 46.5 FL (ref 37–54)
EGFRCR SERPLBLD CKD-EPI 2021: 38.9 ML/MIN/1.73
EGFRCR SERPLBLD CKD-EPI 2021: 48.7 ML/MIN/1.73
EOSINOPHIL # BLD AUTO: 0.01 10*3/MM3 (ref 0–0.4)
EOSINOPHIL NFR BLD AUTO: 0.1 % (ref 0.3–6.2)
EPAP: 0
ERYTHROCYTE [DISTWIDTH] IN BLOOD BY AUTOMATED COUNT: 12.9 % (ref 12.3–15.4)
ERYTHROCYTE [DISTWIDTH] IN BLOOD BY AUTOMATED COUNT: 13.6 % (ref 12.3–15.4)
GLOBULIN UR ELPH-MCNC: 1 GM/DL
GLOBULIN UR ELPH-MCNC: 1.4 GM/DL
GLUCOSE BLDC GLUCOMTR-MCNC: 106 MG/DL (ref 70–130)
GLUCOSE BLDC GLUCOMTR-MCNC: 116 MG/DL (ref 70–130)
GLUCOSE BLDC GLUCOMTR-MCNC: 132 MG/DL (ref 70–130)
GLUCOSE BLDC GLUCOMTR-MCNC: 134 MG/DL (ref 70–130)
GLUCOSE BLDC GLUCOMTR-MCNC: 135 MG/DL (ref 70–130)
GLUCOSE BLDC GLUCOMTR-MCNC: 139 MG/DL (ref 70–130)
GLUCOSE BLDC GLUCOMTR-MCNC: 139 MG/DL (ref 70–130)
GLUCOSE BLDC GLUCOMTR-MCNC: 153 MG/DL (ref 70–130)
GLUCOSE BLDC GLUCOMTR-MCNC: 154 MG/DL (ref 70–130)
GLUCOSE BLDC GLUCOMTR-MCNC: 155 MG/DL (ref 70–130)
GLUCOSE BLDC GLUCOMTR-MCNC: 157 MG/DL (ref 70–130)
GLUCOSE BLDC GLUCOMTR-MCNC: 163 MG/DL (ref 70–130)
GLUCOSE BLDC GLUCOMTR-MCNC: 165 MG/DL (ref 70–130)
GLUCOSE BLDC GLUCOMTR-MCNC: 173 MG/DL (ref 70–130)
GLUCOSE BLDC GLUCOMTR-MCNC: 174 MG/DL (ref 70–130)
GLUCOSE BLDC GLUCOMTR-MCNC: 179 MG/DL (ref 70–130)
GLUCOSE BLDC GLUCOMTR-MCNC: 179 MG/DL (ref 70–130)
GLUCOSE BLDC GLUCOMTR-MCNC: 191 MG/DL (ref 70–130)
GLUCOSE BLDC GLUCOMTR-MCNC: 197 MG/DL (ref 70–130)
GLUCOSE BLDC GLUCOMTR-MCNC: 204 MG/DL (ref 70–130)
GLUCOSE BLDC GLUCOMTR-MCNC: 205 MG/DL (ref 70–130)
GLUCOSE BLDC GLUCOMTR-MCNC: 95 MG/DL (ref 70–130)
GLUCOSE SERPL-MCNC: 150 MG/DL (ref 65–99)
GLUCOSE SERPL-MCNC: 215 MG/DL (ref 65–99)
HCO3 BLDA-SCNC: 21.2 MMOL/L (ref 20–26)
HCT VFR BLD AUTO: 26.3 % (ref 37.5–51)
HCT VFR BLD AUTO: 26.8 % (ref 37.5–51)
HCT VFR BLD AUTO: 27 % (ref 37.5–51)
HCT VFR BLD CALC: 27.8 % (ref 38–51)
HGB BLD-MCNC: 8.4 G/DL (ref 13–17.7)
HGB BLD-MCNC: 8.8 G/DL (ref 13–17.7)
HGB BLD-MCNC: 9 G/DL (ref 13–17.7)
HGB BLDA-MCNC: 9.1 G/DL (ref 13.5–17.5)
IMM GRANULOCYTES # BLD AUTO: 0.06 10*3/MM3 (ref 0–0.05)
IMM GRANULOCYTES NFR BLD AUTO: 0.5 % (ref 0–0.5)
INHALED O2 CONCENTRATION: 40 %
IPAP: 0
LYMPHOCYTES # BLD AUTO: 1.03 10*3/MM3 (ref 0.7–3.1)
LYMPHOCYTES NFR BLD AUTO: 8.4 % (ref 19.6–45.3)
MAGNESIUM SERPL-MCNC: 1.9 MG/DL (ref 1.6–2.4)
MAGNESIUM SERPL-MCNC: 3.8 MG/DL (ref 1.6–2.4)
MCH RBC QN AUTO: 29.8 PG (ref 26.6–33)
MCH RBC QN AUTO: 30.2 PG (ref 26.6–33)
MCHC RBC AUTO-ENTMCNC: 32.6 G/DL (ref 31.5–35.7)
MCHC RBC AUTO-ENTMCNC: 33.6 G/DL (ref 31.5–35.7)
MCV RBC AUTO: 88.7 FL (ref 79–97)
MCV RBC AUTO: 92.8 FL (ref 79–97)
METHGB BLD QL: 0.1 % (ref 0–1.5)
MODALITY: ABNORMAL
MONOCYTES # BLD AUTO: 1.22 10*3/MM3 (ref 0.1–0.9)
MONOCYTES NFR BLD AUTO: 9.9 % (ref 5–12)
NEUTROPHILS NFR BLD AUTO: 80.8 % (ref 42.7–76)
NEUTROPHILS NFR BLD AUTO: 9.92 10*3/MM3 (ref 1.7–7)
NRBC BLD AUTO-RTO: 0 /100 WBC (ref 0–0.2)
OXYHGB MFR BLDV: 98 % (ref 94–99)
PAW @ PEAK INSP FLOW SETTING VENT: 0 CMH2O
PCO2 BLDA: 40.4 MM HG (ref 35–45)
PCO2 TEMP ADJ BLD: 40.4 MM HG (ref 35–48)
PEEP RESPIRATORY: 5 CM[H2O]
PH BLDA: 7.33 PH UNITS (ref 7.35–7.45)
PH, TEMP CORRECTED: 7.33 PH UNITS
PHOSPHATE SERPL-MCNC: 0.5 MG/DL (ref 2.5–4.5)
PHOSPHATE SERPL-MCNC: 4.7 MG/DL (ref 2.5–4.5)
PLATELET # BLD AUTO: 108 10*3/MM3 (ref 140–450)
PLATELET # BLD AUTO: 123 10*3/MM3 (ref 140–450)
PMV BLD AUTO: 11.9 FL (ref 6–12)
PMV BLD AUTO: 12.1 FL (ref 6–12)
PO2 BLDA: 121 MM HG (ref 83–108)
PO2 TEMP ADJ BLD: 121 MM HG (ref 83–108)
POTASSIUM SERPL-SCNC: 3.5 MMOL/L (ref 3.5–5.2)
POTASSIUM SERPL-SCNC: 5.4 MMOL/L (ref 3.5–5.2)
POTASSIUM SERPL-SCNC: 5.6 MMOL/L (ref 3.5–5.2)
POTASSIUM SERPL-SCNC: 6 MMOL/L (ref 3.5–5.2)
PROT SERPL-MCNC: 5.2 G/DL (ref 6–8.5)
PROT SERPL-MCNC: 5.7 G/DL (ref 6–8.5)
PSV: 10 CMH2O
RBC # BLD AUTO: 2.91 10*6/MM3 (ref 4.14–5.8)
RBC # BLD AUTO: 3.02 10*6/MM3 (ref 4.14–5.8)
SODIUM SERPL-SCNC: 144 MMOL/L (ref 136–145)
SODIUM SERPL-SCNC: 147 MMOL/L (ref 136–145)
TOTAL RATE: 0 BREATHS/MINUTE
UFH PPP CHRO-ACNC: 0.1 IU/ML (ref 0.3–0.7)
UNIT  ABO: NORMAL
UNIT  RH: NORMAL
VENTILATOR MODE: ABNORMAL
VT ON VENT VENT: 0.43 ML
WBC NRBC COR # BLD AUTO: 12.28 10*3/MM3 (ref 3.4–10.8)
WBC NRBC COR # BLD AUTO: 12.54 10*3/MM3 (ref 3.4–10.8)

## 2024-08-10 PROCEDURE — 25010000002 CEFAZOLIN PER 500 MG: Performed by: THORACIC SURGERY (CARDIOTHORACIC VASCULAR SURGERY)

## 2024-08-10 PROCEDURE — 25010000002 FENTANYL CITRATE (PF) 50 MCG/ML SOLUTION: Performed by: THORACIC SURGERY (CARDIOTHORACIC VASCULAR SURGERY)

## 2024-08-10 PROCEDURE — 84132 ASSAY OF SERUM POTASSIUM: CPT | Performed by: THORACIC SURGERY (CARDIOTHORACIC VASCULAR SURGERY)

## 2024-08-10 PROCEDURE — 84100 ASSAY OF PHOSPHORUS: CPT | Performed by: THORACIC SURGERY (CARDIOTHORACIC VASCULAR SURGERY)

## 2024-08-10 PROCEDURE — 25010000002 MORPHINE PER 10 MG: Performed by: THORACIC SURGERY (CARDIOTHORACIC VASCULAR SURGERY)

## 2024-08-10 PROCEDURE — 94799 UNLISTED PULMONARY SVC/PX: CPT

## 2024-08-10 PROCEDURE — 25010000002 ALBUMIN HUMAN 5% PER 50 ML: Performed by: INTERNAL MEDICINE

## 2024-08-10 PROCEDURE — 85025 COMPLETE CBC W/AUTO DIFF WBC: CPT | Performed by: THORACIC SURGERY (CARDIOTHORACIC VASCULAR SURGERY)

## 2024-08-10 PROCEDURE — 82375 ASSAY CARBOXYHB QUANT: CPT

## 2024-08-10 PROCEDURE — 99233 SBSQ HOSP IP/OBS HIGH 50: CPT | Performed by: INTERNAL MEDICINE

## 2024-08-10 PROCEDURE — 71045 X-RAY EXAM CHEST 1 VIEW: CPT

## 2024-08-10 PROCEDURE — 97162 PT EVAL MOD COMPLEX 30 MIN: CPT

## 2024-08-10 PROCEDURE — 83050 HGB METHEMOGLOBIN QUAN: CPT

## 2024-08-10 PROCEDURE — 85520 HEPARIN ASSAY: CPT

## 2024-08-10 PROCEDURE — 83735 ASSAY OF MAGNESIUM: CPT | Performed by: THORACIC SURGERY (CARDIOTHORACIC VASCULAR SURGERY)

## 2024-08-10 PROCEDURE — 85014 HEMATOCRIT: CPT | Performed by: THORACIC SURGERY (CARDIOTHORACIC VASCULAR SURGERY)

## 2024-08-10 PROCEDURE — 99232 SBSQ HOSP IP/OBS MODERATE 35: CPT | Performed by: INTERNAL MEDICINE

## 2024-08-10 PROCEDURE — 82948 REAGENT STRIP/BLOOD GLUCOSE: CPT

## 2024-08-10 PROCEDURE — 82805 BLOOD GASES W/O2 SATURATION: CPT

## 2024-08-10 PROCEDURE — 80053 COMPREHEN METABOLIC PANEL: CPT | Performed by: THORACIC SURGERY (CARDIOTHORACIC VASCULAR SURGERY)

## 2024-08-10 PROCEDURE — P9041 ALBUMIN (HUMAN),5%, 50ML: HCPCS | Performed by: INTERNAL MEDICINE

## 2024-08-10 PROCEDURE — 25010000002 ONDANSETRON PER 1 MG: Performed by: THORACIC SURGERY (CARDIOTHORACIC VASCULAR SURGERY)

## 2024-08-10 PROCEDURE — 93005 ELECTROCARDIOGRAM TRACING: CPT | Performed by: THORACIC SURGERY (CARDIOTHORACIC VASCULAR SURGERY)

## 2024-08-10 PROCEDURE — 94003 VENT MGMT INPAT SUBQ DAY: CPT

## 2024-08-10 PROCEDURE — 25010000002 PROPOFOL 10 MG/ML EMULSION: Performed by: THORACIC SURGERY (CARDIOTHORACIC VASCULAR SURGERY)

## 2024-08-10 PROCEDURE — 25010000002 MAGNESIUM SULFATE 4 GM/100ML SOLUTION: Performed by: THORACIC SURGERY (CARDIOTHORACIC VASCULAR SURGERY)

## 2024-08-10 PROCEDURE — 85018 HEMOGLOBIN: CPT | Performed by: THORACIC SURGERY (CARDIOTHORACIC VASCULAR SURGERY)

## 2024-08-10 RX ORDER — DEXMEDETOMIDINE HYDROCHLORIDE 4 UG/ML
.2-1.5 INJECTION, SOLUTION INTRAVENOUS
Status: DISCONTINUED | OUTPATIENT
Start: 2024-08-10 | End: 2024-08-10

## 2024-08-10 RX ORDER — HYDROCODONE BITARTRATE AND ACETAMINOPHEN 7.5; 325 MG/1; MG/1
1 TABLET ORAL EVERY 6 HOURS PRN
Status: DISPENSED | OUTPATIENT
Start: 2024-08-10 | End: 2024-08-15

## 2024-08-10 RX ORDER — METOCLOPRAMIDE HYDROCHLORIDE 5 MG/ML
5 INJECTION INTRAMUSCULAR; INTRAVENOUS EVERY 6 HOURS PRN
Status: DISCONTINUED | OUTPATIENT
Start: 2024-08-10 | End: 2024-08-15 | Stop reason: HOSPADM

## 2024-08-10 RX ORDER — ASPIRIN 81 MG/1
81 TABLET ORAL DAILY
Status: DISCONTINUED | OUTPATIENT
Start: 2024-08-10 | End: 2024-08-15 | Stop reason: HOSPADM

## 2024-08-10 RX ORDER — ALBUMIN, HUMAN INJ 5% 5 %
250 SOLUTION INTRAVENOUS ONCE
Status: COMPLETED | OUTPATIENT
Start: 2024-08-10 | End: 2024-08-10

## 2024-08-10 RX ORDER — POTASSIUM CHLORIDE 1.5 G/1.58G
40 POWDER, FOR SOLUTION ORAL EVERY 4 HOURS
Status: COMPLETED | OUTPATIENT
Start: 2024-08-10 | End: 2024-08-10

## 2024-08-10 RX ORDER — MAGNESIUM SULFATE HEPTAHYDRATE 40 MG/ML
4 INJECTION, SOLUTION INTRAVENOUS ONCE
Status: COMPLETED | OUTPATIENT
Start: 2024-08-10 | End: 2024-08-10

## 2024-08-10 RX ORDER — COLCHICINE 0.6 MG/1
0.6 TABLET ORAL DAILY
Status: DISCONTINUED | OUTPATIENT
Start: 2024-08-10 | End: 2024-08-15 | Stop reason: HOSPADM

## 2024-08-10 RX ADMIN — PROPOFOL 25 MCG/KG/MIN: 10 INJECTION, EMULSION INTRAVENOUS at 00:05

## 2024-08-10 RX ADMIN — OXYCODONE HYDROCHLORIDE 5 MG: 10 TABLET ORAL at 07:33

## 2024-08-10 RX ADMIN — CHLORHEXIDINE GLUCONATE, 0.12% ORAL RINSE 15 ML: 1.2 SOLUTION DENTAL at 20:27

## 2024-08-10 RX ADMIN — POTASSIUM PHOSPHATE, MONOBASIC POTASSIUM PHOSPHATE, DIBASIC 15 MMOL: 224; 236 INJECTION, SOLUTION, CONCENTRATE INTRAVENOUS at 00:57

## 2024-08-10 RX ADMIN — SODIUM CHLORIDE 2 G: 900 INJECTION INTRAVENOUS at 00:05

## 2024-08-10 RX ADMIN — ASPIRIN 81 MG: 81 TABLET, COATED ORAL at 11:09

## 2024-08-10 RX ADMIN — MORPHINE SULFATE 2 MG: 2 INJECTION, SOLUTION INTRAMUSCULAR; INTRAVENOUS at 13:03

## 2024-08-10 RX ADMIN — ONDANSETRON 4 MG: 2 INJECTION INTRAMUSCULAR; INTRAVENOUS at 03:03

## 2024-08-10 RX ADMIN — INSULIN HUMAN 0.5 UNITS/HR: 1 INJECTION, SOLUTION INTRAVENOUS at 20:49

## 2024-08-10 RX ADMIN — POTASSIUM PHOSPHATE, MONOBASIC POTASSIUM PHOSPHATE, DIBASIC 15 MMOL: 224; 236 INJECTION, SOLUTION, CONCENTRATE INTRAVENOUS at 01:48

## 2024-08-10 RX ADMIN — ROSUVASTATIN CALCIUM 20 MG: 20 TABLET, COATED ORAL at 20:27

## 2024-08-10 RX ADMIN — FENTANYL CITRATE 25 MCG: 50 INJECTION, SOLUTION INTRAMUSCULAR; INTRAVENOUS at 00:05

## 2024-08-10 RX ADMIN — CHLORHEXIDINE GLUCONATE, 0.12% ORAL RINSE 15 ML: 1.2 SOLUTION DENTAL at 08:14

## 2024-08-10 RX ADMIN — COLCHICINE 0.6 MG: 0.6 TABLET ORAL at 11:09

## 2024-08-10 RX ADMIN — POTASSIUM CHLORIDE 40 MEQ: 1.5 POWDER, FOR SOLUTION ORAL at 04:55

## 2024-08-10 RX ADMIN — OXYCODONE HYDROCHLORIDE 5 MG: 10 TABLET ORAL at 01:20

## 2024-08-10 RX ADMIN — POTASSIUM PHOSPHATE, MONOBASIC POTASSIUM PHOSPHATE, DIBASIC 15 MMOL: 224; 236 INJECTION, SOLUTION, CONCENTRATE INTRAVENOUS at 03:09

## 2024-08-10 RX ADMIN — NOREPINEPHRINE BITARTRATE 0.08 MCG/KG/MIN: 0.03 INJECTION, SOLUTION INTRAVENOUS at 20:49

## 2024-08-10 RX ADMIN — ALBUMIN (HUMAN) 250 ML: 12.5 INJECTION, SOLUTION INTRAVENOUS at 12:47

## 2024-08-10 RX ADMIN — ACETAMINOPHEN 650 MG: 650 SOLUTION ORAL at 04:58

## 2024-08-10 RX ADMIN — MORPHINE SULFATE 2 MG: 2 INJECTION, SOLUTION INTRAMUSCULAR; INTRAVENOUS at 03:03

## 2024-08-10 RX ADMIN — POTASSIUM CHLORIDE 40 MEQ: 1.5 POWDER, FOR SOLUTION ORAL at 00:50

## 2024-08-10 RX ADMIN — MAGNESIUM SULFATE IN WATER FOR 4 G: 40 INJECTION INTRAVENOUS at 00:49

## 2024-08-10 RX ADMIN — MORPHINE SULFATE 2 MG: 2 INJECTION, SOLUTION INTRAMUSCULAR; INTRAVENOUS at 05:41

## 2024-08-10 RX ADMIN — MORPHINE SULFATE 2 MG: 2 INJECTION, SOLUTION INTRAMUSCULAR; INTRAVENOUS at 17:07

## 2024-08-10 RX ADMIN — ACETAMINOPHEN 650 MG: 650 SOLUTION ORAL at 01:05

## 2024-08-10 RX ADMIN — SODIUM CHLORIDE 2 G: 900 INJECTION INTRAVENOUS at 15:27

## 2024-08-10 RX ADMIN — SENNOSIDES AND DOCUSATE SODIUM 2 TABLET: 50; 8.6 TABLET ORAL at 20:27

## 2024-08-10 RX ADMIN — MORPHINE SULFATE 2 MG: 2 INJECTION, SOLUTION INTRAMUSCULAR; INTRAVENOUS at 08:49

## 2024-08-10 RX ADMIN — ACETAMINOPHEN 650 MG: 325 TABLET ORAL at 11:08

## 2024-08-10 RX ADMIN — SODIUM CHLORIDE 2 G: 900 INJECTION INTRAVENOUS at 07:33

## 2024-08-10 NOTE — THERAPY EVALUATION
Patient Name: Preet Edwards  : 1955    MRN: [id number]                              Today's Date: 8/10/2024       Admit Date: 2024    Visit Dx:     ICD-10-CM ICD-9-CM   1. NSTEMI (non-ST elevated myocardial infarction)  I21.4 410.70   2. Coronary artery disease involving native heart, unspecified vessel or lesion type, unspecified whether angina present  I25.10 414.01     Patient Active Problem List   Diagnosis    NSTEMI (non-ST elevated myocardial infarction)    MV CAD    Ischemic cardiomyopathy    Severe aortic stenosis    Bilateral carotid artery stenosis (>70%)    Former smoker    Dyslipidemia     Past Medical History:   Diagnosis Date    COPD (chronic obstructive pulmonary disease)     GERD (gastroesophageal reflux disease)      Past Surgical History:   Procedure Laterality Date    CARDIAC CATHETERIZATION N/A 2024    Procedure: Left Heart Cath;  Surgeon: Logan Calix MD;  Location: [facility] CATH INVASIVE LOCATION;  Service: Cardiovascular;  Laterality: N/A;    CATARACT EXTRACTION        General Information       Row Name 08/10/24 0940          Physical Therapy Time and Intention    Document Type evaluation  -LAISHA     Mode of Treatment physical therapy  -LAISHA       Row Name 08/10/24 0940          General Information    Patient Profile Reviewed yes  -LAISHA     Prior Level of Function independent:;gait;transfer;ADL's;bed mobility  -LAISHA     Existing Precautions/Restrictions cardiac;oxygen therapy device and L/min;sternal  -LAISHA     Barriers to Rehab medically complex  -LAISHA       Row Name 08/10/24 0940          Living Environment    People in Home spouse  -LAISHA       Row Name 08/10/24 0940          Home Main Entrance    Number of Stairs, Main Entrance five  -LAISHA       Row Name 08/10/24 09          Cognition    Orientation Status (Cognition) oriented x 4  -LAISHA       Row Name 08/10/24 0940          Safety Issues, Functional Mobility    Safety Issues Affecting Function (Mobility) safety precautions  follow-through/compliance;safety precaution awareness  -LAISHA               User Key  (r) = Recorded By, (t) = Taken By, (c) = Cosigned By      Initials Name Provider Type    Shannan Rodríguez PT Physical Therapist                   Mobility       Row Name 08/10/24 0941          Bed Mobility    Comment, (Bed Mobility) patient is OOB and returns to the chair  -LAISHA       Row Name 08/10/24 0941          Bed-Chair Transfer    Bed-Chair [place] (Transfers) minimum assist (75% patient effort);2 person assist  -LAISHA     Assistive Device (Bed-Chair Transfers) walker, front-wheeled  -LAISHA       Row Name 08/10/24 0941          Sit-Stand Transfer    Sit-Stand [place] (Transfers) minimum assist (75% patient effort);2 person assist  -LAISHA     Assistive Device (Sit-Stand Transfers) walker, front-wheeled  -LAISHA       Row Name 08/10/24 0941          Gait/Stairs (Locomotion)    [place] Level (Gait) minimum assist (75% patient effort);2 person assist;1 person to manage equipment  -LAISHA     Assistive Device (Gait) walker, front-wheeled  -LAISHA     Distance in Feet (Gait) 40  -LAISHA     Deviations/Abnormal Patterns (Gait) stride length decreased;rancho decreased  -LAISHA     Bilateral Gait Deviations forward flexed posture  -LAISHA     Comment, (Gait/Stairs) patient with unsteady gait pattern chair brought behind him for safety  -LAISHA               User Key  (r) = Recorded By, (t) = Taken By, (c) = Cosigned By      Initials Name Provider Type    Shannan Rodríguez PT Physical Therapist                   Obj/Interventions       Row Name 08/10/24 0942          Range of Motion Comprehensive    General Range of Motion no range of motion deficits identified  -LAISHA       Row Name 08/10/24 0942          Strength Comprehensive (MMT)    Comment, General Manual Muscle Testing (MMT) Assessment generalized weakness 4-/5  -LAISHA       Row Name 08/10/24 0942          Balance    Balance Assessment sitting static balance;sitting dynamic balance;standing static  balance;standing dynamic balance  -LAISHA     Static Sitting Balance contact guard  -LAISHA     Dynamic Sitting Balance contact guard  -LAISHA     Position, Sitting Balance unsupported;sitting in chair  -LAISHA     Static Standing Balance minimal assist  -LAISHA     Dynamic Standing Balance minimal assist  -LAISHA     Position/Device Used, Standing Balance supported;walker, front-wheeled  -LAISHA               User Key  (r) = Recorded By, (t) = Taken By, (c) = Cosigned By      Initials Name Provider Type    LAISHA Shannan Briceno A, PT Physical Therapist                   Goals/Plan       Row Name 08/10/24 0946          Bed Mobility Goal 1 (PT)    Activity/Assistive Device (Bed Mobility Goal 1, PT) bed mobility activities, all  -LAISHA     [place] Level/Cues Needed (Bed Mobility Goal 1, PT) independent  -LAISHA     Time Frame (Bed Mobility Goal 1, PT) long term goal (LTG);10 days  -LAISHA     Progress/Outcomes (Bed Mobility Goal 1, PT) goal ongoing  -LAISHA       Row Name 08/10/24 0946          Transfer Goal 1 (PT)    Activity/Assistive Device (Transfer Goal 1, PT) sit-to-stand/stand-to-sit  -LAISHA     [place] Level/Cues Needed (Transfer Goal 1, PT) independent  -LAISHA     Time Frame (Transfer Goal 1, PT) short term goal (STG);5 days  -LAISHA     Progress/Outcome (Transfer Goal 1, PT) goal ongoing  -LAISHA       Row Name 08/10/24 0946          Gait Training Goal 1 (PT)    Activity/Assistive Device (Gait Training Goal 1, PT) gait (walking locomotion)  -LAISHA     [place] Level (Gait Training Goal 1, PT) independent  -LAISHA     Distance (Gait Training Goal 1, PT) 440  -LAISHA     Time Frame (Gait Training Goal 1, PT) long term goal (LTG);10 days  -LAISHA     Progress/Outcome (Gait Training Goal 1, PT) goal ongoing  -LAISHA       Row Name 08/10/24 0946          Stairs Goal 1 (PT)    Activity/Assistive Device (Stairs Goal 1, PT) ascending stairs;descending stairs  -LAISHA     [place] Level/Cues Needed (Stairs Goal 1, PT) independent  -LAISHA     Number of Stairs (Stairs Goal 1, PT) 5   -LAISHA     Time Frame (Stairs Goal 1, PT) long term goal (LTG);10 days  -LAISHA     Progress/Outcome (Stairs Goal 1, PT) goal ongoing  -LAISHA       Row Name 08/10/24 0946          Therapy Assessment/Plan (PT)    Planned Therapy Interventions (PT) balance training;bed mobility training;gait training;home exercise program;strengthening;stair training;transfer training  -LAISHA               User Key  (r) = Recorded By, (t) = Taken By, (c) = Cosigned By      Initials Name Provider Type    Shannan Rodríguez, PT Physical Therapist                   Clinical Impression       [facility] Name 08/10/24 0943          Pain    Pretreatment Pain Rating 5/10  -LAISHA     Posttreatment Pain Rating 7/10  -LAISHA     Pain Location incisional  -LAISHA     Pain Location - chest  -LAISHA     Pain Intervention(s) Repositioned;Ambulation/increased activity;Splinting;Medication (See MAR)  -LAISHA       Row Name 08/10/24 0943          Plan of Care Review    Plan of Care Reviewed With patient  -LAISHA     Progress improving  -LAISHA     Outcome Evaluation PT eval is completed. patient presents S/P CABG x3 and AVR. patient demonstrates impaired bed mobility transfers and gait compared to baseline status. patient was able to ambulate 40 ft with assist of 2 and chair behind for safety. patient required instruction on sternal precautions. anticipate patient to be able to go home with family assist at D/C  -[facility] Name 08/10/24 0943          Therapy Assessment/Plan (PT)    Patient/Family Therapy Goals Statement (PT) return to PLOF  -LAISHA     Rehab Potential (PT) good, to achieve stated therapy goals  -LAISHA     Criteria for Skilled Interventions Met (PT) yes;skilled treatment is necessary  -LAISHA     Therapy Frequency (PT) daily  -LAISHA     Predicted Duration of Therapy Intervention (PT) 10 days  -LAISHA       Row Name 08/10/24 0943          Vital Signs    Pre Systolic BP Rehab 106  -LAISHA     Pre Treatment Diastolic BP 56  -LAISHA     Post Systolic BP Rehab 124  -LAISHA     Post Treatment Diastolic BP 59   -LAISHA     Pretreatment Heart Rate (beats/min) 77  -LAISHA     Posttreatment Heart Rate (beats/min) 88  -LAISHA     Pre SpO2 (%) 93  -LAISHA     O2 Delivery Pre Treatment nasal cannula  -LAISHA     Post SpO2 (%) 91  -LAISHA     O2 Delivery Post Treatment nasal cannula  -LAISHA     Pre Patient Position Sitting  -LAISHA     Intra Patient Position Standing  -LAISHA     Post Patient Position Sitting  -LAISHA       Row Name 08/10/24 0943          Positioning and Restraints    Pre-Treatment Position sitting in chair/recliner  -LAISHA     Post Treatment Position chair  -LAISHA     In Chair notified nsg;reclined;sitting;call light within reach;with family/caregiver;encouraged to call for assist  -LAISHA               User Key  (r) = Recorded By, (t) = Taken By, (c) = Cosigned By      Initials Name Provider Type    Shannan Rodríguez PT Physical Therapist                   Outcome Measures       Row Name 08/10/24 0947 08/10/24 0800       How much help from another person do you currently need...    Turning from your back to your side while in flat bed without using bedrails? 2  -LAISHA 2  -TD    Moving from lying on back to sitting on the side of a flat bed without bedrails? 2  -LAISHA 2  -TD    Moving to and from a bed to a chair (including a wheelchair)? 3  -LAISHA 3  -TD    Standing up from a chair using your arms (e.g., wheelchair, bedside chair)? 3  -LAISHA 3  -TD    Climbing 3-5 steps with a railing? 2  -LAISHA 2  -TD    To walk in hospital room? 3  -LAISHA 3  -TD    AM-PAC 6 Clicks Score (PT) 15  -LAISHA 15  -TD    Highest Level of Mobility Goal 4 --> Transfer to chair/commode  -LAISHA 4 --> Transfer to chair/commode  -TD              User Key  (r) = Recorded By, (t) = Taken By, (c) = Cosigned By      Initials Name Provider Type    Shannan Rodríguez PT Physical Therapist    Bala Pillai RN Registered Nurse                                 Physical Therapy Education       Title: PT OT SLP Therapies (In Progress)       Topic: Physical Therapy (In Progress)       Point: Mobility training (In  Progress)       Learning Progress Summary             Patient Acceptance, E, NR by LAISHA at 8/10/2024 0730                         Point: Home exercise program (In Progress)       Learning Progress Summary             Patient Acceptance, E, NR by LAISHA at 8/10/2024 0730                         Point: Body mechanics (In Progress)       Learning Progress Summary             Patient Acceptance, E, NR by LAISHA at 8/10/2024 0730                         Point: Precautions (In Progress)       Learning Progress Summary             Patient Acceptance, E, NR by LAISHA at 8/10/2024 0730                                         User Key       Initials Effective Dates Name Provider Type Discipline     02/03/23 -  Shannan Briceno PT Physical Therapist PT                  PT Recommendation and Plan  Planned Therapy Interventions (PT): balance training, bed mobility training, gait training, home exercise program, strengthening, stair training, transfer training  Plan of Care Reviewed With: patient  Progress: improving  Outcome Evaluation: PT eval is completed. patient presents S/P CABG x3 and AVR. patient demonstrates impaired bed mobility transfers and gait compared to baseline status. patient was able to ambulate 40 ft with assist of 2 and chair behind for safety. patient required instruction on sternal precautions. anticipate patient to be able to go home with family assist at D/C     Time Calculation:   PT Evaluation Complexity  History, PT Evaluation Complexity: 3 or more personal factors and/or comorbidities  Examination of Body Systems (PT Eval Complexity): total of 4 or more elements  Clinical Presentation (PT Evaluation Complexity): unstable  Clinical Decision Making (PT Evaluation Complexity): moderate complexity  Overall Complexity (PT Evaluation Complexity): moderate complexity     PT Charges       Row Name 08/10/24 0948             Time Calculation    Start Time 0730  -LAISHA      PT Received On 08/10/24  -LAISHA      PT Goal Re-Cert  Due Date 08/20/24  -LAISHA         Untimed Charges    PT Eval/Re-eval Minutes 49  -LAISHA         Total Minutes    Untimed Charges Total Minutes 49  -LAISHA       Total Minutes 49  -LAISHA                User Key  (r) = Recorded By, (t) = Taken By, (c) = Cosigned By      Initials Name Provider Type    Shannan Rodríguez, PT Physical Therapist                  Therapy Charges for Today       Code Description Service Date Service Provider Modifiers Qty    20628721180 HC PT EVAL MOD COMPLEXITY 4 8/10/2024 Shannan Briceno PT GP 1            PT G-Codes  AM-PAC 6 Clicks Score (PT): 15  PT Discharge Summary  Anticipated Discharge Disposition (PT): home with assist    Shannan Briceno, PT  8/10/2024

## 2024-08-10 NOTE — PROGRESS NOTES
CTS Progress Note      POD 1 s/p AVR CABG X 3       LOS: 3 days   Patient Care Team:  Edgard Romero MD as PCP - General (Family Medicine)    Subjective  Sitting in chair    Objective    Vital Signs  Temp:  [96.8 °F (36 °C)-100 °F (37.8 °C)] 99.3 °F (37.4 °C)  Heart Rate:  [67-97] 90  Resp:  [14-26] 20  BP: ()/(23-76) 108/50  FiO2 (%):  [40 %-100 %] 40 %    Physical Exam:   General Appearance: alert, appears stated age and cooperative   Lungs: shallow   Heart: regular rhythm & normal rate, normal S1, S2, no murmur, no gallop, no rub, and no click   Skin: nl Incision c/d/i     Results     Results from last 7 days   Lab Units 08/10/24  0504   WBC 10*3/mm3 12.28*   HEMOGLOBIN g/dL 8.8*   HEMATOCRIT % 27.0*   PLATELETS 10*3/mm3 123*     Results from last 7 days   Lab Units 08/10/24  0504   SODIUM mmol/L 144   POTASSIUM mmol/L 6.0*   CHLORIDE mmol/L 113*   CO2 mmol/L 21.0*   BUN mg/dL 24*   CREATININE mg/dL 1.88*   GLUCOSE mg/dL 215*   CALCIUM mg/dL 7.9*           Imaging Results (Last 24 Hours)       Procedure Component Value Units Date/Time    XR Chest 1 View [[id number]] Resulted: 08/10/24 0340     Updated: 08/10/24 0444    XR Chest 1 View [[id number]] Collected: 08/09/24 1908     Updated: 08/09/24 1912    Narrative:      XR CHEST 1 VW    Date of Exam: 8/9/2024 6:31 PM EDT    Indication: s/p CABG    Comparison: None available    Findings:  The endotracheal tube tip is 3.3 cm above the josé. There is a gastric suction tube which terminates below the left hemidiaphragm. There is a midline chest tube and left basilar chest tube present. There is a right IJ introducer with Altmar-Binta catheter   tip terminating at the mid SVC. There are postsurgical changes of CABG. There is mild left basilar airspace opacity, favor atelectasis. There is no pneumothorax. There are degenerative changes of the thoracic spine.      Impression:      Impression:  1. Support devices appear in expected position.  2. Left basilar  airspace opacity, favor atelectasis.      Electronically Signed: Dl Epstein    8/9/2024 7:09 PM EDT    Workstation ID: [id number]            Assessment      NSTEMI (non-ST elevated myocardial infarction)    MV CAD    Ischemic cardiomyopathy    Severe aortic stenosis    Bilateral carotid artery stenosis (>70%)    Former smoker    Dyslipidemia        Plan   Check K last one was 6.0  May give more volume/ Blood    Gage FREDDY Gibson MD  08/10/24  08:29 EDT

## 2024-08-10 NOTE — PLAN OF CARE
Goal Outcome Evaluation:  Plan of Care Reviewed With: patient        Progress: improving  Outcome Evaluation: PT eval is completed. patient presents S/P CABG x3 and AVR. patient demonstrates impaired bed mobility transfers and gait compared to baseline status. patient was able to ambulate 40 ft with assist of 2 and chair behind for safety. patient required instruction on sternal precautions. anticipate patient to be able to go home with family assist at D/C      Anticipated Discharge Disposition (PT): home with assist

## 2024-08-10 NOTE — PROGRESS NOTES
"[facility] Cardiology Daily Note       LOS: 3 days   Patient Care Team:  Edgard Romero MD as PCP - General (Family Medicine)    Chief Complaint: AVR, dyslipidemia, ischemic cardiomyopathy    Subjective     Subjective: Was nauseated earlier today when he smelled his breakfast tray.  Had a vagal event.  Not hungry currently.  Has chest discomfort but does not want to take narcotics.  95% saturated on 4 L nasal cannula.  Blood pressures have been good overnight.  Heart rates ranged between 51 and 92 bpm.  Cardiac index is 3.0.    Drips: Norepinephrine and epinephrine    Review of Systems:   As above.    Medications:  acetaminophen, 650 mg, Oral, Q8H   Or  acetaminophen, 650 mg, Oral, Q8H   Or  acetaminophen, 650 mg, Rectal, Q8H  [Transfer Hold] aspirin, 81 mg, Oral, Daily  carvedilol, 3.125 mg, Oral, BID With Meals  ceFAZolin, 2 g, Intravenous, Q8H  chlorhexidine, 15 mL, Mouth/Throat, Q12H  [Transfer Hold] pharmacy consult - [facility], , Does not apply, Daily  [Transfer Hold] rosuvastatin, 20 mg, Oral, Nightly  senna-docusate sodium, 2 tablet, Oral, Nightly  [Transfer Hold] sodium chloride, 10 mL, Intravenous, Q12H        Objective     Vital Sign Min/Max for last 24 hours  Temp  Min: 96.8 °F (36 °C)  Max: 100 °F (37.8 °C)   BP  Min: 79/23  Max: 139/64   Pulse  Min: 51  Max: 97   Resp  Min: 14  Max: 26   SpO2  Min: 93 %  Max: 100 %   Flow (L/min)  Min: 2  Max: 4   No data recorded      Intake/Output Summary (Last 24 hours) at 8/10/2024 0925  Last data filed at 8/10/2024 0800  Gross per 24 hour   Intake 4652 ml   Output 1985 ml   Net 2667 ml        Flowsheet Rows      Flowsheet Row First Filed Value   Admission Height 172.7 cm (67.99\") Documented at 08/07/2024 1900   Admission Weight 82.5 kg (181 lb 14.1 oz) Documented at 08/07/2024 1900            Physical Exam:    General: Sleepy but awakens easily.  Cardiovascular: Heart has a nondisplaced focal PMI. Regular rate and rhythm without murmur, gallop or " "rub.  Lungs: Clear without rales or wheezes. Equal expansion is noted.  Decreased breath sounds bilaterally.  Abdomen: Soft, nontender.  Extremities: Show no edema.   Skin: warm and dry.     Results Review:    I reviewed the patient's new clinical results.  EKG:  Tele: Sinus rhythm    Labs:    Results from last 7 days   Lab Units 08/10/24  0836 08/10/24  0504 08/09/24  2333 08/09/24  1809   SODIUM mmol/L  --  144 147* 144   POTASSIUM mmol/L 5.4* 6.0* 3.5 4.4   CHLORIDE mmol/L  --  113* 113* 110*   CO2 mmol/L  --  21.0* 21.0* 20.0*   BUN mg/dL  --  24* 21 22   CREATININE mg/dL  --  1.88* 1.56* 1.40*   CALCIUM mg/dL  --  7.9* 8.3* 8.4*   BILIRUBIN mg/dL  --  1.7* 2.0*  --    ALK PHOS U/L  --  22* 20*  --    ALT (SGPT) U/L  --  14 11  --    AST (SGOT) U/L  --  36 28  --    GLUCOSE mg/dL  --  215* 150* 182*     Results from last 7 days   Lab Units 08/10/24  0504 08/09/24  2333 08/09/24  1809   WBC 10*3/mm3 12.28* 12.54* 11.03*   HEMOGLOBIN g/dL 8.8* 9.0* 10.5*   HEMATOCRIT % 27.0* 26.8* 32.2*   PLATELETS 10*3/mm3 123* 108* 82*     Lab Results   Component Value Date    TROPONINT 827 (C) 08/08/2024    TROPONINT 850 (C) 08/08/2024     Lab Results   Component Value Date    CHOL 80 08/09/2024    CHOL 205 (H) 08/08/2024     Lab Results   Component Value Date    TRIG 72 08/09/2024    TRIG 163 (H) 08/08/2024     Lab Results   Component Value Date    HDL 15 (L) 08/09/2024    HDL 33 (L) 08/08/2024     No components found for: \"LDLCALC\"  Lab Results   Component Value Date    INR 1.79 (H) 08/09/2024    INR 1.28 (H) 08/07/2024    PROTIME 20.9 (H) 08/09/2024    PROTIME 16.1 (H) 08/07/2024         Ejection Fraction:    Assessment   Assessment:    Coronary artery disease   Non-STEMI.  C 8/7/2024 MV CAD  CABG 8/9/2024 Dr. Gibson: LOCKHART to the LAD, SVG to diagonal and OM sequentially  Ischemic cardiomyopathy  EF 30% by LV gram  BB held due to marginal BP  Critical aortic stenosis  Peak gradient 46 mmHg in setting of severe LV " dysfunction.  Aortic valve replacement using a 23 mm bovine Tapia prosthesis.  Dyslipidemia  Acute HFrEF  Remote tobacco use resolved 14 years ago  COPD  Elevated TSH  Acute renal insufficiency      Plan:    Add Reglan 5 mg IV every 6 hours as needed nausea  Add colchicine 0.6 mg daily for pain  Continue aspirin for coronary artery disease  Continue to wean drips as able.  Continue rosuvastatin for coronary artery disease  Consider carvedilol and Entresto when blood pressure will allow    Fern Braxton MD  08/10/24  09:25 EDT

## 2024-08-10 NOTE — PROGRESS NOTES
INTENSIVIST   PROGRESS NOTE     Hospital:  LOS: 3 days     Mr. Preet Edwards, 68 y.o. male is followed for a Chief Complaint of: Glycemic, Electrolyte, Respiratory, and Medical management        Subjective   S     Interval History:  Extubated early this AM.      The patient's relevant past medical, surgical and social history were reviewed and updated in Epic as appropriate.      ROS:   Constitutional: Negative for fever.   Respiratory: Negative for dyspnea.   Cardiovascular: Negative for chest pain.   Gastrointestinal: Negative for  nausea, vomiting and diarrhea.       Objective   O     Vitals:  Temp  Min: 96.8 °F (36 °C)  Max: 100 °F (37.8 °C)  BP  Min: 79/23  Max: 139/64  Pulse  Min: 51  Max: 96  Resp  Min: 14  Max: 26  SpO2  Min: 91 %  Max: 100 % Flow (L/min)  Min: 2  Max: 4    Intake/Ouptut 24 hrs (7:00AM - 6:59 AM)  Intake & Output (last 3 days)         08/06 0701  08/07 0700 08/07 0701  08/08 0700 08/08 0701  08/09 0700 08/09 0701  08/10 0700    P.O.   240     Total Intake(mL/kg)   240 (3.2)     Urine (mL/kg/hr)   450 (0.2)     Total Output   450     Net   -210                     Medications (drips):  dextrose 5 % with KCl 20 mEq, Last Rate: 50 mL/hr (08/09/24 1837)  EPINEPHrine, Last Rate: 0.1 mcg/kg/min (08/10/24 1400)  insulin, Last Rate: 1.2 Units/hr (08/10/24 1309)  norepinephrine, Last Rate: 0.02 mcg/kg/min (08/10/24 1425)          Physical Examination  Telemetry:  Normal sinus rhythm.    Constitutional:  No acute distress.  Sitting up in bed on nasal cannula.    Cardiovascular: Normal rate, regular and rhythm. Normal heart sounds.  No murmurs, gallop or rub.   Respiratory: No respiratory distress. Normal respiratory effort.  Diminished.  Chest tubes with serosanguinous drainage.    Abdominal:  Soft. No masses. Non-tender. No distension. No HSM.   Extremities: No digital cyanosis. No clubbing.  No peripheral edema.   Neurological:   Sedated.              Results from last 7 days   Lab Units  08/10/24  1522 08/10/24  0504 08/09/24  2333 08/09/24  1809   WBC 10*3/mm3  --  12.28* 12.54* 11.03*   HEMOGLOBIN g/dL 8.4* 8.8* 9.0* 10.5*   MCV fL  --  92.8 88.7 89.7   PLATELETS 10*3/mm3  --  123* 108* 82*     Results from last 7 days   Lab Units 08/10/24  0836 08/10/24  0504 08/09/24  2333 08/09/24  1809   SODIUM mmol/L  --  144 147* 144   POTASSIUM mmol/L 5.4* 6.0* 3.5 4.4   CO2 mmol/L  --  21.0* 21.0* 20.0*   CREATININE mg/dL  --  1.88* 1.56* 1.40*   GLUCOSE mg/dL  --  215* 150* 182*   MAGNESIUM mg/dL  --  3.8* 1.9  --    PHOSPHORUS mg/dL 4.7*  --  0.5* 3.0     Estimated Creatinine Clearance: 41.4 mL/min (A) (by C-G formula based on SCr of 1.88 mg/dL (H)).  Results from last 7 days   Lab Units 08/10/24  0504 08/09/24  2333   ALK PHOS U/L 22* 20*   BILIRUBIN mg/dL 1.7* 2.0*   ALT (SGPT) U/L 14 11   AST (SGOT) U/L 36 28       Results from last 7 days   Lab Units 08/10/24  0202 08/09/24  1813 08/09/24  1723 08/09/24  1656 08/09/24  1626   PH, ARTERIAL pH units 7.328* 7.386 7.36 7.27* 7.28*   PCO2, ARTERIAL mm Hg 40.4 33.5*  --   --   --    PO2 ART mm Hg 121.0* 395.0*  --   --   --    FIO2 % 40 100  --   --   --        Images:  Imaging Results (Last 24 Hours)       Procedure Component Value Units Date/Time    XR Chest 1 View [[id number]] Resulted: 08/10/24 1531     Updated: 08/10/24 1505    XR Chest 1 View [[id number]] Collected: 08/10/24 0858     Updated: 08/10/24 0903    Narrative:      XR CHEST 1 VW    Date of Exam: 8/10/2024 4:20 AM EDT    Indication: POD 1    Comparison:  8/9/2024     Findings:  Enteric tube, right IJ catheter, mediastinal drains, left chest tube appear in similar positions. No definite pneumothorax. Status post median sternotomy and CABG. Heart size appears unchanged. There are increased left basilar opacities. Small left   pleural effusion is not excluded.      Impression:      Impression:  1.Tubes and lines appear in similar positions.  2.Increased left basilar opacities which could be  related to atelectasis or infiltrate. Small left effusion is not excluded.        Electronically Signed: Bobo Fried    8/10/2024 9:00 AM EDT    Workstation ID: [id number]    XR Chest 1 View [[id number]] Collected: 08/09/24 1908     Updated: 08/09/24 1912    Narrative:      XR CHEST 1 VW    Date of Exam: 8/9/2024 6:31 PM EDT    Indication: s/p CABG    Comparison: None available    Findings:  The endotracheal tube tip is 3.3 cm above the josé. There is a gastric suction tube which terminates below the left hemidiaphragm. There is a midline chest tube and left basilar chest tube present. There is a right IJ introducer with [place]-Binta catheter   tip terminating at the mid SVC. There are postsurgical changes of CABG. There is mild left basilar airspace opacity, favor atelectasis. There is no pneumothorax. There are degenerative changes of the thoracic spine.      Impression:      Impression:  1. Support devices appear in expected position.  2. Left basilar airspace opacity, favor atelectasis.      Electronically Signed: Dl Epstein    8/9/2024 7:09 PM EDT    Workstation ID: [id number]               Results: Reviewed.  I reviewed the patient's new laboratory and imaging results.  I independently reviewed the patient's new images.    Medications: Reviewed.    Assessment & Plan   A / P     Mr. Edwards is a 69yo M former smoker who was admitted to [facility] on 8/7/24 as a field STEMI and taken to the cath lab by Dr. Calix. He was found to have multi-vessel CAD and a CABG was recommended. Echocardiogram revealed severe aortic stenosis.     He underwent CABG x 3 and AVR with a bovine Tapia valve by Dr. Gibson on 8/9/24. He was admitted to the ICU on mechanical ventilation.     He was extubated on the morning of 8/10/24.     Nutrition:   Diet: Cardiac, Diabetic, Regular/House; Healthy Heart (2-3 Na+); Consistent Carbohydrate; Fluid Consistency: Thin (IDDSI 0)  Advance Directives:   Code Status and Medical Interventions: CPR  (Attempt to Resuscitate); Full Support   Ordered at: 08/09/24 7548     Level Of Support Discussed With:    Patient     Code Status (Patient has no pulse and is not breathing):    CPR (Attempt to Resuscitate)     Medical Interventions (Patient has pulse or is breathing):    Full Support       Active Hospital Problems    Diagnosis     **NSTEMI (non-ST elevated myocardial infarction)     Ischemic cardiomyopathy     Severe aortic stenosis     Bilateral carotid artery stenosis (>70%)     Former smoker     Dyslipidemia     MV CAD        Assessment / Plan:    Wean supplemental oxygen.   Titrate vasopressors.   Postoperative management per CTS  Will continue insulin drip due to late OR time and vasopressor requirements.   Pain control/supportive care  AM labs and CXR    High risk secondary to titration of vasopressors.     Plan of care and goals reviewed during interdisciplinary rounds.  I discussed the patient's findings and my recommendations with nursing staff      Greta Ruelas DO    Intensive Care Medicine and Pulmonary Medicine

## 2024-08-11 ENCOUNTER — APPOINTMENT (OUTPATIENT)
Dept: GENERAL RADIOLOGY | Facility: HOSPITAL | Age: 69
End: 2024-08-11
Payer: COMMERCIAL

## 2024-08-11 LAB
ANION GAP SERPL CALCULATED.3IONS-SCNC: 11 MMOL/L (ref 5–15)
ANION GAP SERPL CALCULATED.3IONS-SCNC: 11 MMOL/L (ref 5–15)
BUN SERPL-MCNC: 26 MG/DL (ref 8–23)
BUN SERPL-MCNC: 30 MG/DL (ref 8–23)
BUN/CREAT SERPL: 17.4 (ref 7–25)
BUN/CREAT SERPL: 23.1 (ref 7–25)
CALCIUM SPEC-SCNC: 7.8 MG/DL (ref 8.6–10.5)
CALCIUM SPEC-SCNC: 8.3 MG/DL (ref 8.6–10.5)
CHLORIDE SERPL-SCNC: 106 MMOL/L (ref 98–107)
CHLORIDE SERPL-SCNC: 109 MMOL/L (ref 98–107)
CO2 SERPL-SCNC: 20 MMOL/L (ref 22–29)
CO2 SERPL-SCNC: 20 MMOL/L (ref 22–29)
CREAT SERPL-MCNC: 1.3 MG/DL (ref 0.76–1.27)
CREAT SERPL-MCNC: 1.49 MG/DL (ref 0.76–1.27)
DEPRECATED RDW RBC AUTO: 50.9 FL (ref 37–54)
DEPRECATED RDW RBC AUTO: 52 FL (ref 37–54)
EGFRCR SERPLBLD CKD-EPI 2021: 51.4 ML/MIN/1.73
EGFRCR SERPLBLD CKD-EPI 2021: 60.6 ML/MIN/1.73
ERYTHROCYTE [DISTWIDTH] IN BLOOD BY AUTOMATED COUNT: 14.8 % (ref 12.3–15.4)
ERYTHROCYTE [DISTWIDTH] IN BLOOD BY AUTOMATED COUNT: 14.9 % (ref 12.3–15.4)
GLUCOSE BLDC GLUCOMTR-MCNC: 124 MG/DL (ref 70–130)
GLUCOSE BLDC GLUCOMTR-MCNC: 126 MG/DL (ref 70–130)
GLUCOSE BLDC GLUCOMTR-MCNC: 127 MG/DL (ref 70–130)
GLUCOSE BLDC GLUCOMTR-MCNC: 128 MG/DL (ref 70–130)
GLUCOSE BLDC GLUCOMTR-MCNC: 129 MG/DL (ref 70–130)
GLUCOSE BLDC GLUCOMTR-MCNC: 129 MG/DL (ref 70–130)
GLUCOSE BLDC GLUCOMTR-MCNC: 130 MG/DL (ref 70–130)
GLUCOSE BLDC GLUCOMTR-MCNC: 133 MG/DL (ref 70–130)
GLUCOSE BLDC GLUCOMTR-MCNC: 133 MG/DL (ref 70–130)
GLUCOSE BLDC GLUCOMTR-MCNC: 134 MG/DL (ref 70–130)
GLUCOSE BLDC GLUCOMTR-MCNC: 140 MG/DL (ref 70–130)
GLUCOSE BLDC GLUCOMTR-MCNC: 140 MG/DL (ref 70–130)
GLUCOSE BLDC GLUCOMTR-MCNC: 154 MG/DL (ref 70–130)
GLUCOSE SERPL-MCNC: 130 MG/DL (ref 65–99)
GLUCOSE SERPL-MCNC: 132 MG/DL (ref 65–99)
HCT VFR BLD AUTO: 26.4 % (ref 37.5–51)
HCT VFR BLD AUTO: 29.5 % (ref 37.5–51)
HGB BLD-MCNC: 8.3 G/DL (ref 13–17.7)
HGB BLD-MCNC: 9.4 G/DL (ref 13–17.7)
MCH RBC QN AUTO: 29.6 PG (ref 26.6–33)
MCH RBC QN AUTO: 29.9 PG (ref 26.6–33)
MCHC RBC AUTO-ENTMCNC: 31.4 G/DL (ref 31.5–35.7)
MCHC RBC AUTO-ENTMCNC: 31.9 G/DL (ref 31.5–35.7)
MCV RBC AUTO: 92.8 FL (ref 79–97)
MCV RBC AUTO: 95 FL (ref 79–97)
PLATELET # BLD AUTO: 90 10*3/MM3 (ref 140–450)
PLATELET # BLD AUTO: 91 10*3/MM3 (ref 140–450)
PMV BLD AUTO: 11.9 FL (ref 6–12)
PMV BLD AUTO: 12.4 FL (ref 6–12)
POTASSIUM SERPL-SCNC: 5.1 MMOL/L (ref 3.5–5.2)
POTASSIUM SERPL-SCNC: 5.5 MMOL/L (ref 3.5–5.2)
QT INTERVAL: 356 MS
QT INTERVAL: 434 MS
QTC INTERVAL: 445 MS
QTC INTERVAL: 488 MS
RBC # BLD AUTO: 2.78 10*6/MM3 (ref 4.14–5.8)
RBC # BLD AUTO: 3.18 10*6/MM3 (ref 4.14–5.8)
SODIUM SERPL-SCNC: 137 MMOL/L (ref 136–145)
SODIUM SERPL-SCNC: 140 MMOL/L (ref 136–145)
WBC NRBC COR # BLD AUTO: 10.64 10*3/MM3 (ref 3.4–10.8)
WBC NRBC COR # BLD AUTO: 8.87 10*3/MM3 (ref 3.4–10.8)

## 2024-08-11 PROCEDURE — 85027 COMPLETE CBC AUTOMATED: CPT | Performed by: THORACIC SURGERY (CARDIOTHORACIC VASCULAR SURGERY)

## 2024-08-11 PROCEDURE — 94761 N-INVAS EAR/PLS OXIMETRY MLT: CPT

## 2024-08-11 PROCEDURE — 86900 BLOOD TYPING SEROLOGIC ABO: CPT

## 2024-08-11 PROCEDURE — 99232 SBSQ HOSP IP/OBS MODERATE 35: CPT | Performed by: INTERNAL MEDICINE

## 2024-08-11 PROCEDURE — 94799 UNLISTED PULMONARY SVC/PX: CPT

## 2024-08-11 PROCEDURE — 94010 BREATHING CAPACITY TEST: CPT | Performed by: INTERNAL MEDICINE

## 2024-08-11 PROCEDURE — 82948 REAGENT STRIP/BLOOD GLUCOSE: CPT

## 2024-08-11 PROCEDURE — P9016 RBC LEUKOCYTES REDUCED: HCPCS

## 2024-08-11 PROCEDURE — P9041 ALBUMIN (HUMAN),5%, 50ML: HCPCS | Performed by: THORACIC SURGERY (CARDIOTHORACIC VASCULAR SURGERY)

## 2024-08-11 PROCEDURE — 94664 DEMO&/EVAL PT USE INHALER: CPT

## 2024-08-11 PROCEDURE — 71045 X-RAY EXAM CHEST 1 VIEW: CPT

## 2024-08-11 PROCEDURE — 63710000001 INSULIN LISPRO (HUMAN) PER 5 UNITS: Performed by: INTERNAL MEDICINE

## 2024-08-11 PROCEDURE — 80048 BASIC METABOLIC PNL TOTAL CA: CPT | Performed by: THORACIC SURGERY (CARDIOTHORACIC VASCULAR SURGERY)

## 2024-08-11 PROCEDURE — 94640 AIRWAY INHALATION TREATMENT: CPT

## 2024-08-11 PROCEDURE — 25010000002 CEFAZOLIN PER 500 MG: Performed by: THORACIC SURGERY (CARDIOTHORACIC VASCULAR SURGERY)

## 2024-08-11 PROCEDURE — 25010000002 ALBUMIN HUMAN 5% PER 50 ML: Performed by: THORACIC SURGERY (CARDIOTHORACIC VASCULAR SURGERY)

## 2024-08-11 PROCEDURE — 36430 TRANSFUSION BLD/BLD COMPNT: CPT

## 2024-08-11 RX ORDER — INSULIN LISPRO 100 [IU]/ML
2-9 INJECTION, SOLUTION INTRAVENOUS; SUBCUTANEOUS
Status: DISCONTINUED | OUTPATIENT
Start: 2024-08-11 | End: 2024-08-15 | Stop reason: HOSPADM

## 2024-08-11 RX ORDER — ALBUMIN, HUMAN INJ 5% 5 %
500 SOLUTION INTRAVENOUS ONCE
Status: COMPLETED | OUTPATIENT
Start: 2024-08-11 | End: 2024-08-11

## 2024-08-11 RX ORDER — CARVEDILOL 3.12 MG/1
3.12 TABLET ORAL EVERY 12 HOURS SCHEDULED
Status: DISCONTINUED | OUTPATIENT
Start: 2024-08-11 | End: 2024-08-11 | Stop reason: SDUPTHER

## 2024-08-11 RX ORDER — IPRATROPIUM BROMIDE AND ALBUTEROL SULFATE 2.5; .5 MG/3ML; MG/3ML
3 SOLUTION RESPIRATORY (INHALATION) EVERY 4 HOURS PRN
Status: DISCONTINUED | OUTPATIENT
Start: 2024-08-11 | End: 2024-08-15 | Stop reason: HOSPADM

## 2024-08-11 RX ADMIN — HYDROCODONE BITARTRATE AND ACETAMINOPHEN 1 TABLET: 7.5; 325 TABLET ORAL at 21:06

## 2024-08-11 RX ADMIN — CARVEDILOL 3.12 MG: 3.12 TABLET, FILM COATED ORAL at 18:22

## 2024-08-11 RX ADMIN — SODIUM CHLORIDE 2 G: 900 INJECTION INTRAVENOUS at 00:02

## 2024-08-11 RX ADMIN — ALBUMIN (HUMAN) 500 ML: 12.5 INJECTION, SOLUTION INTRAVENOUS at 08:47

## 2024-08-11 RX ADMIN — Medication 10 ML: at 08:13

## 2024-08-11 RX ADMIN — SODIUM CHLORIDE 2 G: 900 INJECTION INTRAVENOUS at 08:12

## 2024-08-11 RX ADMIN — IPRATROPIUM BROMIDE AND ALBUTEROL SULFATE 3 ML: 2.5; .5 SOLUTION RESPIRATORY (INHALATION) at 21:25

## 2024-08-11 RX ADMIN — HYDROCODONE BITARTRATE AND ACETAMINOPHEN 1 TABLET: 7.5; 325 TABLET ORAL at 08:12

## 2024-08-11 RX ADMIN — INSULIN LISPRO 2 UNITS: 100 INJECTION, SOLUTION INTRAVENOUS; SUBCUTANEOUS at 21:09

## 2024-08-11 RX ADMIN — Medication 10 ML: at 21:06

## 2024-08-11 RX ADMIN — SENNOSIDES AND DOCUSATE SODIUM 2 TABLET: 50; 8.6 TABLET ORAL at 21:06

## 2024-08-11 RX ADMIN — CHLORHEXIDINE GLUCONATE, 0.12% ORAL RINSE 15 ML: 1.2 SOLUTION DENTAL at 21:06

## 2024-08-11 RX ADMIN — ROSUVASTATIN CALCIUM 20 MG: 20 TABLET, COATED ORAL at 21:06

## 2024-08-11 RX ADMIN — HYDROCODONE BITARTRATE AND ACETAMINOPHEN 1 TABLET: 7.5; 325 TABLET ORAL at 13:54

## 2024-08-11 RX ADMIN — COLCHICINE 0.6 MG: 0.6 TABLET ORAL at 08:12

## 2024-08-11 RX ADMIN — ASPIRIN 81 MG: 81 TABLET, COATED ORAL at 08:12

## 2024-08-11 RX ADMIN — IPRATROPIUM BROMIDE AND ALBUTEROL SULFATE 3 ML: 2.5; .5 SOLUTION RESPIRATORY (INHALATION) at 07:45

## 2024-08-11 RX ADMIN — CHLORHEXIDINE GLUCONATE, 0.12% ORAL RINSE 15 ML: 1.2 SOLUTION DENTAL at 08:12

## 2024-08-11 NOTE — PROGRESS NOTES
"[facility] Cardiology Daily Note       LOS: 4 days   Patient Care Team:  Edgard Romero MD as PCP - General (Family Medicine)    Chief Complaint: AVR, dyslipidemia, ischemic cardiomyopathy    Subjective     Subjective: No complaints except his chest is still sore.  98% on 2 L nasal cannula.  Blood pressures have been good overnight.  Heart rates mostly in the 90s.    Off drips.    Review of Systems:   As above.    Medications:  aspirin, 81 mg, Oral, Daily  carvedilol, 3.125 mg, Oral, BID With Meals  chlorhexidine, 15 mL, Mouth/Throat, Q12H  colchicine, 0.6 mg, Oral, Daily  [Transfer Hold] pharmacy consult - [facility], , Does not apply, Daily  rosuvastatin, 20 mg, Oral, Nightly  senna-docusate sodium, 2 tablet, Oral, Nightly  sodium chloride, 10 mL, Intravenous, Q12H        Objective     Vital Sign Min/Max for last 24 hours  Temp  Min: 98.6 °F (37 °C)  Max: 99.7 °F (37.6 °C)   BP  Min: 97/56  Max: 138/84   Pulse  Min: 74  Max: 98   Resp  Min: 14  Max: 30   SpO2  Min: 90 %  Max: 99 %   Flow (L/min)  Min: 2  Max: 2   No data recorded      Intake/Output Summary (Last 24 hours) at 8/11/2024 1038  Last data filed at 8/11/2024 0600  Gross per 24 hour   Intake 1977 ml   Output 970 ml   Net 1007 ml        Flowsheet Rows      Flowsheet Row First Filed Value   Admission Height 172.7 cm (67.99\") Documented at 08/07/2024 1900   Admission Weight 82.5 kg (181 lb 14.1 oz) Documented at 08/07/2024 1900            Physical Exam:    General: Awake and alert  Cardiovascular: Heart has a nondisplaced focal PMI. Regular rate and rhythm without murmur, gallop or rub.  Lungs: A few rhonchi are heard bilaterally.  Equal expansion is noted.  Decreased breath sounds bilaterally.  Abdomen: Soft, nontender.  Extremities: Show trace edema.   Skin: warm and dry.     Results Review:    I reviewed the patient's new clinical results.  EKG:  Tele: Sinus rhythm    Labs:    Results from last 7 days   Lab Units 08/11/24  0403 " "08/10/24  1522 08/10/24  0836 08/10/24  0504 08/09/24  2333   SODIUM mmol/L 140  --   --  144 147*   POTASSIUM mmol/L 5.5* 5.6* 5.4* 6.0* 3.5   CHLORIDE mmol/L 109*  --   --  113* 113*   CO2 mmol/L 20.0*  --   --  21.0* 21.0*   BUN mg/dL 26*  --   --  24* 21   CREATININE mg/dL 1.49*  --   --  1.88* 1.56*   CALCIUM mg/dL 7.8*  --   --  7.9* 8.3*   BILIRUBIN mg/dL  --   --   --  1.7* 2.0*   ALK PHOS U/L  --   --   --  22* 20*   ALT (SGPT) U/L  --   --   --  14 11   AST (SGOT) U/L  --   --   --  36 28   GLUCOSE mg/dL 130*  --   --  215* 150*     Results from last 7 days   Lab Units 08/11/24  0403 08/10/24  1522 08/10/24  0504 08/09/24  2333   WBC 10*3/mm3 10.64  --  12.28* 12.54*   HEMOGLOBIN g/dL 8.3* 8.4* 8.8* 9.0*   HEMATOCRIT % 26.4* 26.3* 27.0* 26.8*   PLATELETS 10*3/mm3 91*  --  123* 108*     Lab Results   Component Value Date    TROPONINT 827 (C) 08/08/2024    TROPONINT 850 (C) 08/08/2024     Lab Results   Component Value Date    CHOL 80 08/09/2024    CHOL 205 (H) 08/08/2024     Lab Results   Component Value Date    TRIG 72 08/09/2024    TRIG 163 (H) 08/08/2024     Lab Results   Component Value Date    HDL 15 (L) 08/09/2024    HDL 33 (L) 08/08/2024     No components found for: \"LDLCALC\"  Lab Results   Component Value Date    INR 1.79 (H) 08/09/2024    INR 1.28 (H) 08/07/2024    PROTIME 20.9 (H) 08/09/2024    PROTIME 16.1 (H) 08/07/2024         Ejection Fraction:    Assessment   Assessment:    Coronary artery disease   Non-STEMI.  C 8/7/2024 MV CAD  CABG 8/9/2024 Dr. Gibson: LOCKHART to the LAD, SVG to diagonal and OM sequentially  Ischemic cardiomyopathy  EF 30% by LV gram  BB held due to marginal BP  Critical aortic stenosis  Peak gradient 46 mmHg in setting of severe LV dysfunction.  Aortic valve replacement using a 23 mm bovine Tapia prosthesis.  Dyslipidemia  Acute HFrEF  Remote tobacco use resolved 14 years ago  COPD  Elevated TSH  Acute renal insufficiency      Plan:    Continue colchicine 0.6 mg daily for " pain  Start carvedilol 3.125 mg every 12 hours.  Continue aspirin for coronary artery disease  Continue to wean drips as able.  Continue rosuvastatin for coronary artery disease  Consider  Entresto when blood pressure will allow    Fern Braxton MD  08/11/24  10:38 EDT

## 2024-08-11 NOTE — PROGRESS NOTES
CTS Progress Note      POD 2 s/p AVR CABG X 3       LOS: 4 days   Patient Care Team:  Edgard Romero MD as PCP - General (Family Medicine)    Subjective  Sitting in chair    Objective    Vital Signs  Temp:  [98.6 °F (37 °C)-99.5 °F (37.5 °C)] 99.1 °F (37.3 °C)  Heart Rate:  [51-98] 96  Resp:  [14-30] 28  BP: ()/() 137/71    Physical Exam:   General Appearance: alert, appears stated age and cooperative   Lungs: shallow   Heart: regular rhythm & normal rate, normal S1, S2, no murmur, no gallop, no rub, and no click   Skin: nl Incision c/d/i     Results     Results from last 7 days   Lab Units 08/11/24  0403   WBC 10*3/mm3 10.64   HEMOGLOBIN g/dL 8.3*   HEMATOCRIT % 26.4*   PLATELETS 10*3/mm3 91*     Results from last 7 days   Lab Units 08/11/24  0403   SODIUM mmol/L 140   POTASSIUM mmol/L 5.5*   CHLORIDE mmol/L 109*   CO2 mmol/L 20.0*   BUN mg/dL 26*   CREATININE mg/dL 1.49*   GLUCOSE mg/dL 130*   CALCIUM mg/dL 7.8*           Imaging Results (Last 24 Hours)       Procedure Component Value Units Date/Time    XR Chest 1 View [[id number]] Collected: 08/10/24 1531     Updated: 08/10/24 1536    Narrative:      XR CHEST 1 VW    Date of Exam: 8/10/2024 2:57 PM EDT    Indication: Post mediastinal tube removal.    Comparison: Chest x-ray 8/10/2024, 8/9/2024    Findings:  Interval removal of NG/OG tube and midline thoracostomy tube. The right IJ CVC remains in place. The left thoracostomy tube remains in place. Stable cardiomediastinal silhouette with mild cardiomegaly. Redemonstration of left base and retrocardiac   opacities likely reflecting combination of small pleural fluid and atelectasis. No definite pneumothorax. Redemonstration of surgical changes of CABG and aortic valve replacement.      Impression:      Impression:  Removal of NG/OG tube and midline thoracostomy tube, with otherwise stable medical support devices. No definite pneumothorax. Similar retrocardiac and left base opacities likely  reflecting combination of small pleural fluid and atelectasis.      Electronically Signed: Fam Tran MD    8/10/2024 3:33 PM EDT    Workstation ID: [id number]    XR Chest 1 View [[id number]] Collected: 08/10/24 0858     Updated: 08/10/24 0903    Narrative:      XR CHEST 1 VW    Date of Exam: 8/10/2024 4:20 AM EDT    Indication: POD 1    Comparison:  8/9/2024     Findings:  Enteric tube, right IJ catheter, mediastinal drains, left chest tube appear in similar positions. No definite pneumothorax. Status post median sternotomy and CABG. Heart size appears unchanged. There are increased left basilar opacities. Small left   pleural effusion is not excluded.      Impression:      Impression:  1.Tubes and lines appear in similar positions.  2.Increased left basilar opacities which could be related to atelectasis or infiltrate. Small left effusion is not excluded.        Electronically Signed: Bobo Fried    8/10/2024 9:00 AM EDT    Workstation ID: [id number]            Assessment      NSTEMI (non-ST elevated myocardial infarction)    MV CAD    Ischemic cardiomyopathy    Severe aortic stenosis    Bilateral carotid artery stenosis (>70%)    Former smoker    Dyslipidemia        Plan   Check K last one was 6.0  May give more volume/ Blood    Gage Gibson MD  08/11/24  08:14 EDT

## 2024-08-11 NOTE — PROGRESS NOTES
INTENSIVIST   PROGRESS NOTE     Hospital:  LOS: 4 days     Mr. Preet Edwards, 68 y.o. male is followed for a Chief Complaint of: Glycemic, Electrolyte, Respiratory, and Medical management        Subjective   S     Interval History:  No acute events. Off vasopressors. Getting a unit of pRBCs.        The patient's relevant past medical, surgical and social history were reviewed and updated in Epic as appropriate.      ROS:   Constitutional: Negative for fever.   Respiratory: Negative for dyspnea.   Cardiovascular: Negative for chest pain.   Gastrointestinal: Negative for  nausea, vomiting and diarrhea.       Objective   O     Vitals:  Temp  Min: 99.1 °F (37.3 °C)  Max: 99.9 °F (37.7 °C)  BP  Min: 97/56  Max: 138/84  Pulse  Min: 74  Max: 98  Resp  Min: 14  Max: 30  SpO2  Min: 90 %  Max: 99 % Flow (L/min)  Min: 1  Max: 2    Intake/Ouptut 24 hrs (7:00AM - 6:59 AM)  Intake & Output (last 3 days)         08/06 0701  08/07 0700 08/07 0701  08/08 0700 08/08 0701  08/09 0700 08/09 0701  08/10 0700    P.O.   240     Total Intake(mL/kg)   240 (3.2)     Urine (mL/kg/hr)   450 (0.2)     Total Output   450     Net   -210                     Medications (drips):  dextrose 5 % with KCl 20 mEq, Last Rate: 50 mL/hr (08/09/24 1837)  EPINEPHrine, Last Rate: Stopped (08/10/24 1941)  norepinephrine, Last Rate: Stopped (08/11/24 0859)          Physical Examination  Telemetry:  Normal sinus rhythm.    Constitutional:  No acute distress.  Sitting up in a chair on nasal cannula.    Cardiovascular: Normal rate, regular and rhythm. Normal heart sounds.  No murmurs, gallop or rub.   Respiratory: No respiratory distress. Normal respiratory effort.  Diminished.  Chest tubes with serosanguinous drainage.    Abdominal:  Soft. No masses. Non-tender. No distension. No HSM.   Extremities: No digital cyanosis. No clubbing.  No peripheral edema.   Neurological:   Alert and interactive.             Results from last 7 days   Lab Units 08/11/24  1233  08/10/24  1522 08/10/24  0504 08/09/24  2333   WBC 10*3/mm3 10.64  --  12.28* 12.54*   HEMOGLOBIN g/dL 8.3* 8.4* 8.8* 9.0*   MCV fL 95.0  --  92.8 88.7   PLATELETS 10*3/mm3 91*  --  123* 108*     Results from last 7 days   Lab Units 08/11/24  0403 08/10/24  1522 08/10/24  0836 08/10/24  0504 08/09/24  2333 08/09/24  1809   SODIUM mmol/L 140  --   --  144 147* 144   POTASSIUM mmol/L 5.5* 5.6* 5.4* 6.0* 3.5 4.4   CO2 mmol/L 20.0*  --   --  21.0* 21.0* 20.0*   CREATININE mg/dL 1.49*  --   --  1.88* 1.56* 1.40*   GLUCOSE mg/dL 130*  --   --  215* 150* 182*   MAGNESIUM mg/dL  --   --   --  3.8* 1.9  --    PHOSPHORUS mg/dL  --   --  4.7*  --  0.5* 3.0     Estimated Creatinine Clearance: 52.2 mL/min (A) (by C-G formula based on SCr of 1.49 mg/dL (H)).  Results from last 7 days   Lab Units 08/10/24  0504 08/09/24  2333   ALK PHOS U/L 22* 20*   BILIRUBIN mg/dL 1.7* 2.0*   ALT (SGPT) U/L 14 11   AST (SGOT) U/L 36 28       Results from last 7 days   Lab Units 08/10/24  0202 08/09/24  1813 08/09/24  1723 08/09/24  1656 08/09/24  1626   PH, ARTERIAL pH units 7.328* 7.386 7.36 7.27* 7.28*   PCO2, ARTERIAL mm Hg 40.4 33.5*  --   --   --    PO2 ART mm Hg 121.0* 395.0*  --   --   --    FIO2 % 40 100  --   --   --        Images:  Imaging Results (Last 24 Hours)       Procedure Component Value Units Date/Time    XR Chest 1 View [[id number]] Collected: 08/10/24 1531     Updated: 08/10/24 1536    Narrative:      XR CHEST 1 VW    Date of Exam: 8/10/2024 2:57 PM EDT    Indication: Post mediastinal tube removal.    Comparison: Chest x-ray 8/10/2024, 8/9/2024    Findings:  Interval removal of NG/OG tube and midline thoracostomy tube. The right IJ CVC remains in place. The left thoracostomy tube remains in place. Stable cardiomediastinal silhouette with mild cardiomegaly. Redemonstration of left base and retrocardiac   opacities likely reflecting combination of small pleural fluid and atelectasis. No definite pneumothorax. Redemonstration of  surgical changes of CABG and aortic valve replacement.      Impression:      Impression:  Removal of NG/OG tube and midline thoracostomy tube, with otherwise stable medical support devices. No definite pneumothorax. Similar retrocardiac and left base opacities likely reflecting combination of small pleural fluid and atelectasis.      Electronically Signed: Fam Tran MD    8/10/2024 3:33 PM EDT    Workstation ID: [id number]               Results: Reviewed.  I reviewed the patient's new laboratory and imaging results.  I independently reviewed the patient's new images.    Medications: Reviewed.    Assessment & Plan   A / P     Mr. Edwards is a 67yo M former smoker who was admitted to [facility] on 8/7/24 as a field STEMI and taken to the cath lab by Dr. Calix. He was found to have multi-vessel CAD and a CABG was recommended. Echocardiogram revealed severe aortic stenosis.     He underwent CABG x 3 and AVR with a bovine Tapia valve by Dr. Gibson on 8/9/24. He was admitted to the ICU on mechanical ventilation.     He was extubated on the morning of 8/10/24.     Nutrition:   Diet: Cardiac, Diabetic, Regular/House; Healthy Heart (2-3 Na+); Consistent Carbohydrate; Fluid Consistency: Thin (IDDSI 0)  Advance Directives:   Code Status and Medical Interventions: CPR (Attempt to Resuscitate); Full Support   Ordered at: 08/09/24 1804     Level Of Support Discussed With:    Patient     Code Status (Patient has no pulse and is not breathing):    CPR (Attempt to Resuscitate)     Medical Interventions (Patient has pulse or is breathing):    Full Support       Active Hospital Problems    Diagnosis     **NSTEMI (non-ST elevated myocardial infarction)     Ischemic cardiomyopathy     Severe aortic stenosis     Bilateral carotid artery stenosis (>70%)     Former smoker     Dyslipidemia     MV CAD        Assessment / Plan:    Wean supplemental oxygen.   Postoperative management per CTS  Transition insulin drip.   Pain control/supportive  care  Pulmonary toilet/flutter valve.   AM labs and CXR      Plan of care and goals reviewed during interdisciplinary rounds.  I discussed the patient's findings and my recommendations with patient, family, and nursing staff      Greta Ruelas,     Intensive Care Medicine and Pulmonary Medicine

## 2024-08-12 ENCOUNTER — APPOINTMENT (OUTPATIENT)
Dept: GENERAL RADIOLOGY | Facility: HOSPITAL | Age: 69
End: 2024-08-12
Payer: COMMERCIAL

## 2024-08-12 LAB
ACT BLD: 128 SECONDS (ref 82–152)
ACT BLD: 140 SECONDS (ref 82–152)
ACT BLD: 165 SECONDS (ref 82–152)
ACT BLD: 342 SECONDS (ref 82–152)
ACT BLD: 458 SECONDS (ref 82–152)
ACT BLD: 507 SECONDS (ref 82–152)
ANION GAP SERPL CALCULATED.3IONS-SCNC: 8 MMOL/L (ref 5–15)
BH BB BLOOD EXPIRATION DATE: NORMAL
BH BB BLOOD EXPIRATION DATE: NORMAL
BH BB BLOOD TYPE BARCODE: 6200
BH BB BLOOD TYPE BARCODE: 6200
BH BB DISPENSE STATUS: NORMAL
BH BB DISPENSE STATUS: NORMAL
BH BB PRODUCT CODE: NORMAL
BH BB PRODUCT CODE: NORMAL
BH BB UNIT NUMBER: NORMAL
BH BB UNIT NUMBER: NORMAL
BUN SERPL-MCNC: 38 MG/DL (ref 8–23)
BUN/CREAT SERPL: 34.9 (ref 7–25)
CALCIUM SPEC-SCNC: 8.6 MG/DL (ref 8.6–10.5)
CHLORIDE SERPL-SCNC: 108 MMOL/L (ref 98–107)
CO2 SERPL-SCNC: 23 MMOL/L (ref 22–29)
CREAT SERPL-MCNC: 1.09 MG/DL (ref 0.76–1.27)
CROSSMATCH INTERPRETATION: NORMAL
CROSSMATCH INTERPRETATION: NORMAL
DEPRECATED RDW RBC AUTO: 50.9 FL (ref 37–54)
EGFRCR SERPLBLD CKD-EPI 2021: 74.9 ML/MIN/1.73
ERYTHROCYTE [DISTWIDTH] IN BLOOD BY AUTOMATED COUNT: 15.1 % (ref 12.3–15.4)
GLUCOSE BLDC GLUCOMTR-MCNC: 120 MG/DL (ref 70–130)
GLUCOSE BLDC GLUCOMTR-MCNC: 121 MG/DL (ref 70–130)
GLUCOSE BLDC GLUCOMTR-MCNC: 121 MG/DL (ref 70–130)
GLUCOSE BLDC GLUCOMTR-MCNC: 125 MG/DL (ref 70–130)
GLUCOSE SERPL-MCNC: 125 MG/DL (ref 65–99)
HCT VFR BLD AUTO: 29.8 % (ref 37.5–51)
HGB BLD-MCNC: 9.6 G/DL (ref 13–17.7)
MAGNESIUM SERPL-MCNC: 2.8 MG/DL (ref 1.6–2.4)
MCH RBC QN AUTO: 29.8 PG (ref 26.6–33)
MCHC RBC AUTO-ENTMCNC: 32.2 G/DL (ref 31.5–35.7)
MCV RBC AUTO: 92.5 FL (ref 79–97)
PHOSPHATE SERPL-MCNC: 2.6 MG/DL (ref 2.5–4.5)
PLATELET # BLD AUTO: 113 10*3/MM3 (ref 140–450)
PMV BLD AUTO: 12.2 FL (ref 6–12)
POTASSIUM SERPL-SCNC: 5 MMOL/L (ref 3.5–5.2)
RBC # BLD AUTO: 3.22 10*6/MM3 (ref 4.14–5.8)
SODIUM SERPL-SCNC: 139 MMOL/L (ref 136–145)
UNIT  ABO: NORMAL
UNIT  ABO: NORMAL
UNIT  RH: NORMAL
UNIT  RH: NORMAL
WBC NRBC COR # BLD AUTO: 8.39 10*3/MM3 (ref 3.4–10.8)

## 2024-08-12 PROCEDURE — 71045 X-RAY EXAM CHEST 1 VIEW: CPT

## 2024-08-12 PROCEDURE — 99232 SBSQ HOSP IP/OBS MODERATE 35: CPT | Performed by: NURSE PRACTITIONER

## 2024-08-12 PROCEDURE — 80048 BASIC METABOLIC PNL TOTAL CA: CPT | Performed by: THORACIC SURGERY (CARDIOTHORACIC VASCULAR SURGERY)

## 2024-08-12 PROCEDURE — 83735 ASSAY OF MAGNESIUM: CPT | Performed by: INTERNAL MEDICINE

## 2024-08-12 PROCEDURE — 94799 UNLISTED PULMONARY SVC/PX: CPT

## 2024-08-12 PROCEDURE — 97530 THERAPEUTIC ACTIVITIES: CPT

## 2024-08-12 PROCEDURE — 84100 ASSAY OF PHOSPHORUS: CPT | Performed by: INTERNAL MEDICINE

## 2024-08-12 PROCEDURE — 82948 REAGENT STRIP/BLOOD GLUCOSE: CPT

## 2024-08-12 PROCEDURE — 25010000002 BUMETANIDE PER 0.5 MG: Performed by: NURSE PRACTITIONER

## 2024-08-12 PROCEDURE — 85027 COMPLETE CBC AUTOMATED: CPT | Performed by: INTERNAL MEDICINE

## 2024-08-12 PROCEDURE — 99232 SBSQ HOSP IP/OBS MODERATE 35: CPT | Performed by: INTERNAL MEDICINE

## 2024-08-12 RX ORDER — BUMETANIDE 0.25 MG/ML
2 INJECTION INTRAMUSCULAR; INTRAVENOUS ONCE
Status: COMPLETED | OUTPATIENT
Start: 2024-08-12 | End: 2024-08-12

## 2024-08-12 RX ORDER — CARVEDILOL 6.25 MG/1
6.25 TABLET ORAL 2 TIMES DAILY WITH MEALS
Status: DISCONTINUED | OUTPATIENT
Start: 2024-08-12 | End: 2024-08-15 | Stop reason: HOSPADM

## 2024-08-12 RX ADMIN — HYDROCODONE BITARTRATE AND ACETAMINOPHEN 1 TABLET: 7.5; 325 TABLET ORAL at 20:27

## 2024-08-12 RX ADMIN — OXYCODONE HYDROCHLORIDE 5 MG: 10 TABLET ORAL at 04:58

## 2024-08-12 RX ADMIN — CARVEDILOL 6.25 MG: 6.25 TABLET, FILM COATED ORAL at 17:41

## 2024-08-12 RX ADMIN — CARVEDILOL 3.12 MG: 3.12 TABLET, FILM COATED ORAL at 08:24

## 2024-08-12 RX ADMIN — HYDROCODONE BITARTRATE AND ACETAMINOPHEN 1 TABLET: 7.5; 325 TABLET ORAL at 13:44

## 2024-08-12 RX ADMIN — Medication 10 ML: at 20:13

## 2024-08-12 RX ADMIN — OXYCODONE HYDROCHLORIDE 5 MG: 10 TABLET ORAL at 18:28

## 2024-08-12 RX ADMIN — BUMETANIDE 2 MG: 0.25 INJECTION INTRAMUSCULAR; INTRAVENOUS at 12:23

## 2024-08-12 RX ADMIN — ASPIRIN 81 MG: 81 TABLET, COATED ORAL at 08:24

## 2024-08-12 RX ADMIN — IPRATROPIUM BROMIDE AND ALBUTEROL SULFATE 3 ML: 2.5; .5 SOLUTION RESPIRATORY (INHALATION) at 20:46

## 2024-08-12 RX ADMIN — SENNOSIDES AND DOCUSATE SODIUM 2 TABLET: 50; 8.6 TABLET ORAL at 20:13

## 2024-08-12 RX ADMIN — COLCHICINE 0.6 MG: 0.6 TABLET ORAL at 08:24

## 2024-08-12 RX ADMIN — ROSUVASTATIN CALCIUM 20 MG: 20 TABLET, COATED ORAL at 20:13

## 2024-08-12 RX ADMIN — Medication 10 ML: at 08:24

## 2024-08-12 NOTE — PROGRESS NOTES
CTS Progress Note      POD 3 s/p AVR CABG X 3       LOS: 5 days   Patient Care Team:  Edgard Romero MD as PCP - General (Family Medicine)    Subjective  Sitting in chair    Objective    Vital Signs  Temp:  [98.2 °F (36.8 °C)-100 °F (37.8 °C)] 98.2 °F (36.8 °C)  Heart Rate:  [] 82  Resp:  [17-28] 28  BP: (112-138)/(57-89) 117/61    Physical Exam:   General Appearance: alert, appears stated age and cooperative   Lungs: shallow   Heart: regular rhythm & normal rate, normal S1, S2, no murmur, no gallop, no rub, and no click   Skin: nl Incision c/d/i     Results     Results from last 7 days   Lab Units 08/12/24  0407   WBC 10*3/mm3 8.39   HEMOGLOBIN g/dL 9.6*   HEMATOCRIT % 29.8*   PLATELETS 10*3/mm3 113*     Results from last 7 days   Lab Units 08/12/24  0546   SODIUM mmol/L 139   POTASSIUM mmol/L 5.0   CHLORIDE mmol/L 108*   CO2 mmol/L 23.0   BUN mg/dL 38*   CREATININE mg/dL 1.09   GLUCOSE mg/dL 125*   CALCIUM mg/dL 8.6           Imaging Results (Last 24 Hours)       Procedure Component Value Units Date/Time    XR Chest 1 View [[id number]] Collected: 08/12/24 0701     Updated: 08/12/24 0705    Narrative:      XR CHEST 1 VW    Date of Exam: 8/12/2024 3:18 AM EDT    Indication: chest tube management    Comparison: Chest radiograph 8/11/2024 at 2154 hours    Findings:  There is a right internal jugular [place]-Binta sheath and catheter with the tip in the super vena cava. There is a left-sided chest tube in place with no pneumothorax. There are postoperative changes from midline sternotomy. There is a left pleural   effusion. There is left basilar atelectasis.      Impression:      Impression:  Left-sided chest tube in place with no pneumothorax. Left basilar atelectasis and left pleural effusion.        Electronically Signed: Sam Mckee MD    8/12/2024 7:02 AM EDT    Workstation ID: [id number]    XR Chest 1 View [[id number]] Collected: 08/11/24 2245     Updated: 08/11/24 2249    Narrative:      XR CHEST 1  VW    Date of Exam: 8/11/2024 9:56 PM EDT    Indication: sob    Comparison: Chest radiograph August 10, 2024    Findings:  There is a right internal jugular [place]-Binta sheath and catheter with the tip in the super vena cava. The heart is enlarged with changes of midline sternotomy. There is a left-sided thoracostomy tube in place. There is a small left pleural effusion. There   is no pneumothorax. There is a small right pleural effusion. There is mild right basilar atelectasis. There is mild left upper lobe atelectasis.      Impression:      Impression:  Cardiomegaly with small bilateral pleural effusions. Mild right basilar atelectasis and left upper lobe atelectasis.        Electronically Signed: Sam Mckee MD    8/11/2024 10:46 PM EDT    Workstation ID: [id number]            Assessment      NSTEMI (non-ST elevated myocardial infarction)    I25.10, CABG + AVR 08/09/24    Ischemic cardiomyopathy    Severe aortic stenosis    Bilateral carotid artery stenosis (>70%)    Former smoker    Dyslipidemia        Plan   Keep left Naeem on suction for small left pleural effusion  May transfer today to telemetry    Gage Gibson MD  08/12/24  07:26 EDT

## 2024-08-12 NOTE — PROGRESS NOTES
INTENSIVIST   PROGRESS NOTE     Hospital:  LOS: 5 days     Mr. Preet Edwards, 68 y.o. male is followed for a Chief Complaint of: Glycemic, Electrolyte, Respiratory, and Medical management        Subjective   S     Interval History:  POD: 3 Days Post-Op CABG 08/09/24    Uneventful night.    Walked without difficulty this morning    Temp  Min: 98.2 °F (36.8 °C)  Max: 100 °F (37.8 °C)      The patient's relevant past medical, surgical and social history were reviewed and updated in Epic as appropriate.        History     Last Reviewed by Luis E Weinstein MD on 8/12/2024 at  8:49 AM    Sections Reviewed    Medical, Surgical, Family, Tobacco, Alcohol, Drug Use, Sexual Activity,   Social Documentation    Problem list reviewed by Luis E Weinstein MD on 8/12/2024 at  8:48 AM  Medicines reviewed by Luis E Weinstein MD on 8/12/2024 at  8:48 AM  Allergies reviewed by Luis E Weinstein MD on 8/12/2024 at  8:48 AM     Objective   O     Vitals:  Temp  Min: 98.2 °F (36.8 °C)  Max: 100 °F (37.8 °C)  BP  Min: 112/66  Max: 138/84  Pulse  Min: 74  Max: 104  Resp  Min: 17  Max: 28  SpO2  Min: 93 %  Max: 98 % Flow (L/min)  Min: 1  Max: 2      Physical Examination    Telemetry:  Rhythm: normal sinus rhythm (08/12/24 1000)           Constitutional:  No acute distress   Cardiovascular: Normal rate, regular and rhythm. Normal heart sounds.   Respiratory: No respiratory distress. Normal respiratory effort.   Abdominal:  Soft. No masses. Nontender.   Extremities: Trace edema.   Neurological:   Alert.       Results from last 7 days   Lab Units 08/12/24  0407 08/11/24  1418 08/11/24  0403   WBC 10*3/mm3 8.39 8.87 10.64   HEMOGLOBIN g/dL 9.6* 9.4* 8.3*   MCV fL 92.5 92.8 95.0   PLATELETS 10*3/mm3 113* 90* 91*     Results from last 7 days   Lab Units 08/12/24  0546 08/12/24  0407 08/11/24  1418 08/11/24  0403 08/10/24  1522 08/10/24  0836 08/10/24  0504 08/09/24  4063   SODIUM mmol/L 139  --  137 140  --   --  144 147*   POTASSIUM mmol/L 5.0  --  5.1  5.5*   < > 5.4* 6.0* 3.5   CO2 mmol/L 23.0  --  20.0* 20.0*  --   --  21.0* 21.0*   CREATININE mg/dL 1.09  --  1.30* 1.49*  --   --  1.88* 1.56*   GLUCOSE mg/dL 125*  --  132* 130*  --   --  215* 150*   MAGNESIUM mg/dL  --  2.8*  --   --   --   --  3.8* 1.9   PHOSPHORUS mg/dL  --  2.6  --   --   --  4.7*  --  0.5*    < > = values in this interval not displayed.     Estimated Creatinine Clearance: 71.4 mL/min (by C-G formula based on SCr of 1.09 mg/dL).    Images:  XR Chest 1 View    Result Date: 8/12/2024  Impression: Left-sided chest tube in place with no pneumothorax. Left basilar atelectasis and left pleural effusion. Electronically Signed: Sam Mckee MD  8/12/2024 7:02 AM EDT  Workstation ID: [id number]    XR Chest 1 View    Result Date: 8/11/2024  Impression: Cardiomegaly with small bilateral pleural effusions. Mild right basilar atelectasis and left upper lobe atelectasis. Electronically Signed: Sam Mckee MD  8/11/2024 10:46 PM EDT  Workstation ID: [id number]    XR Chest 1 View    Result Date: 8/10/2024  Impression: Removal of NG/OG tube and midline thoracostomy tube, with otherwise stable medical support devices. No definite pneumothorax. Similar retrocardiac and left base opacities likely reflecting combination of small pleural fluid and atelectasis. Electronically Signed: Fam Tran MD  8/10/2024 3:33 PM EDT  Workstation ID: [id number]      Results: Reviewed.  I reviewed the patient's new laboratory and imaging results.  I independently reviewed the patient's new images.    Medications: Reviewed.    Assessment & Plan   A / P     Mr. Edwards is a 67yo M former smoker who came on 8/7/24 as a field STEMI.    His LHC showed multi-vessel disease. (Dr. Calix). Echocardiogram revealed severe aortic stenosis.     He underwent CABG x 3 and AVR with a bovine Tapia valve by Dr. Gibson on 8/9/24.     He was extubated on the morning of 8/10/24.     Nutrition:   Diet: Cardiac, Diabetic, Regular/House; Healthy Heart  (2-3 Na+); Consistent Carbohydrate; Fluid Consistency: Thin (IDDSI 0)    Advance Directives:   Code Status and Medical Interventions: CPR (Attempt to Resuscitate); Full Support   Ordered at: 08/09/24 1804     Level Of Support Discussed With:    Patient     Code Status (Patient has no pulse and is not breathing):    CPR (Attempt to Resuscitate)     Medical Interventions (Patient has pulse or is breathing):    Full Support       Active Hospital Problems    Diagnosis     **NSTEMI (non-ST elevated myocardial infarction)     Ischemic cardiomyopathy     Severe aortic stenosis     Bilateral carotid artery stenosis (>70%)     Former smoker     Dyslipidemia     I25.10, CABG + AVR 08/09/24        Assessment / Plan:    Excellent post-op course.  Diuretics per cardiology Service.  Disposition: Telemetry.      Plan of care and goals reviewed during interdisciplinary rounds.  I discussed the patient's findings and my recommendations with patient, family, and nursing staff

## 2024-08-12 NOTE — THERAPY TREATMENT NOTE
Patient Name: Preet Edwards  : 1955    MRN: [id number]                              Today's Date: 2024       Admit Date: 2024    Visit Dx:     ICD-10-CM ICD-9-CM   1. NSTEMI (non-ST elevated myocardial infarction)  I21.4 410.70   2. Coronary artery disease involving native heart, unspecified vessel or lesion type, unspecified whether angina present  I25.10 414.01     Patient Active Problem List   Diagnosis    NSTEMI (non-ST elevated myocardial infarction)    I25.10, CABG + AVR 24    Ischemic cardiomyopathy    Severe aortic stenosis    Bilateral carotid artery stenosis (>70%)    Former smoker    Dyslipidemia     Past Medical History:   Diagnosis Date    COPD (chronic obstructive pulmonary disease)     GERD (gastroesophageal reflux disease)      Past Surgical History:   Procedure Laterality Date    CARDIAC CATHETERIZATION N/A 2024    Procedure: Left Heart Cath;  Surgeon: Logan Calix MD;  Location: [facility] CATH INVASIVE LOCATION;  Service: Cardiovascular;  Laterality: N/A;    CATARACT EXTRACTION      CORONARY ARTERY BYPASS GRAFT WITH AORTIC VALVE REPAIR/REPLACEMENT N/A 2024    Procedure: CORONARY ARTERY BYPASS X 3 WITH INTERNAL MAMMARY ARTERY GRAFT AND AORTIC VALVE REPAIR/REPLACEMENT, SINDY, EVH;  Surgeon: Gage Gibson MD;  Location: [facility] OR;  Service: Cardiothoracic;  Laterality: N/A;      General Information       Row Name 24          Physical Therapy Time and Intention    Document Type therapy note (daily note)  -KG     Mode of Treatment physical therapy  -KG       Row Name 24          General Information    Existing Precautions/Restrictions cardiac;fall;sternal  -KG     Barriers to Rehab medically complex  -KG       Row Name 24          Cognition    Orientation Status (Cognition) oriented x 3  -KG       Row Name 24 141          Safety Issues, Functional Mobility    Safety Issues Affecting Function (Mobility) awareness of need for  assistance;insight into deficits/self-awareness;safety precaution awareness;safety precautions follow-through/compliance  -KG     Impairments Affecting Function (Mobility) balance;coordination;endurance/activity tolerance;pain;postural/trunk control;shortness of breath;strength  -KG               User Key  (r) = Recorded By, (t) = Taken By, (c) = Cosigned By      Initials Name Provider Type    KG Margarita Street N, PT Physical Therapist                   Mobility       Row Name 08/12/24 1418          Bed Mobility    Bed Mobility sit-supine  -KG     Sit-Supine [place] (Bed Mobility) maximum assist (25% patient effort);2 person assist;verbal cues  -KG     Assistive Device (Bed Mobility) head of bed elevated  -KG     Comment, (Bed Mobility) VC's for sequencing and safe hand placement to maintain sternal precautions. Pt required increased assistance at trunk and BLEs.  -KG       Row Name 08/12/24 1418          Transfers    Comment, (Transfers) VC's for sequencing and safe hand placement to maintain sternal precautions.  -KG       Row Name 08/12/24 1418          Sit-Stand Transfer    Sit-Stand [place] (Transfers) minimum assist (75% patient effort);2 person assist;verbal cues  -KG       Row Name 08/12/24 1418          Gait/Stairs (Locomotion)    [place] Level (Gait) minimum assist (75% patient effort);2 person assist;verbal cues  -KG     Assistive Device (Gait) other (see comments)  B UE support on tripod monitor  -KG     Distance in Feet (Gait) 50  +50  -KG     Deviations/Abnormal Patterns (Gait) rancho decreased;stride length decreased  -KG     Bilateral Gait Deviations forward flexed posture  -KG     Comment, (Gait/Stairs) Pt demonstrated step through gait pattern with slow rancho and decreased step length. VC's for upright posture and to keep monitor close with feet inside middle. Pt with periods of decreased balance and coordination. Pt required one prolonged standing rest break due to increased  SOA. Declined additional ambulation.  -KG               User Key  (r) = Recorded By, (t) = Taken By, (c) = Cosigned By      Initials Name Provider Type    Margarita Woods PT Physical Therapist                   Obj/Interventions       Row Name 08/12/24 1419          Balance    Balance Assessment sitting static balance;standing static balance;standing dynamic balance  -KG     Static Sitting Balance contact guard  -KG     Position, Sitting Balance unsupported;sitting in chair  -KG     Static Standing Balance minimal assist  -KG     Dynamic Standing Balance minimal assist;2-person assist  -KG     Position/Device Used, Standing Balance supported  -KG               User Key  (r) = Recorded By, (t) = Taken By, (c) = Cosigned By      Initials Name Provider Type    Margarita Woods PT Physical Therapist                   Goals/Plan    No documentation.                  Clinical Impression       Row Name 08/12/24 1420          Pain    Additional Documentation Pain Scale: FACES Pre/Post-Treatment (Group)  -KG       Row Name 08/12/24 1420          Pain Scale: FACES Pre/Post-Treatment    Pain: FACES Scale, Pretreatment 4-->hurts little more  -KG     Posttreatment Pain Rating 6-->hurts even more  -KG     Pain Location incisional  -KG     Pain Location - chest  -KG       Row Name 08/12/24 1420          Plan of Care Review    Plan of Care Reviewed With patient  -KG     Progress improving  -KG     Outcome Evaluation Pt ambulated 50ft +50ft with Kevin x2 and B UE support on tripod monitor. Pt demonstrated slow rancho with short steps and forward flexed posture. Tendency to keep monitor too far away from body contributing to decreased balance. Pt required one standing rest break due to c/o increased SOA. Continue to recommend PT skilled care and D/C home with assistance and  PT services.  -KG       Row Name 08/12/24 1420          Vital Signs    Pre Systolic BP Rehab 133  -KG     Pre Treatment Diastolic BP 64  -KG      Post Systolic BP Rehab 148  -KG     Post Treatment Diastolic BP 81  -KG     Pretreatment Heart Rate (beats/min) 96  -KG     Posttreatment Heart Rate (beats/min) 97  -KG     Pre SpO2 (%) 90  -KG     O2 Delivery Pre Treatment room air  -KG     Intra SpO2 (%) 83  -KG     O2 Delivery Intra Treatment room air  -KG     Post SpO2 (%) 97  -KG     O2 Delivery Post Treatment supplemental O2  -KG     Pre Patient Position Sitting  -KG     Intra Patient Position Standing  -KG     Post Patient Position Supine  -KG       Row Name 08/12/24 1420          Positioning and Restraints    Pre-Treatment Position sitting in chair/recliner  -KG     Post Treatment Position bed  -KG     In Bed notified nsg;supine;call light within reach;encouraged to call for assist;side rails up x2;RUE elevated;LUE elevated  -KG               User Key  (r) = Recorded By, (t) = Taken By, (c) = Cosigned By      Initials Name Provider Type    Margarita Woods, PT Physical Therapist                   Outcome Measures       Row Name 08/12/24 1421          How much help from another person do you currently need...    Turning from your back to your side while in flat bed without using bedrails? 2  -KG     Moving from lying on back to sitting on the side of a flat bed without bedrails? 2  -KG     Moving to and from a bed to a chair (including a wheelchair)? 3  -KG     Standing up from a chair using your arms (e.g., wheelchair, bedside chair)? 3  -KG     Climbing 3-5 steps with a railing? 2  -KG     To walk in hospital room? 3  -KG     AM-PAC 6 Clicks Score (PT) 15  -KG     Highest Level of Mobility Goal 4 --> Transfer to chair/commode  -KG       Row Name 08/12/24 1421          Functional Assessment    Outcome Measure Options AM-PAC 6 Clicks Basic Mobility (PT)  -KG               User Key  (r) = Recorded By, (t) = Taken By, (c) = Cosigned By      Initials Name Provider Type    Margarita Woods, PT Physical Therapist                                  Physical Therapy Education       Title: PT OT SLP Therapies (In Progress)       Topic: Physical Therapy (In Progress)       Point: Mobility training (In Progress)       Learning Progress Summary             Patient Acceptance, E, NR by KG at 8/12/2024 0918    Acceptance, E, NR by LAISHA at 8/10/2024 0730                         Point: Home exercise program (In Progress)       Learning Progress Summary             Patient Acceptance, E, NR by KG at 8/12/2024 0918    Acceptance, E, NR by LAISHA at 8/10/2024 0730                         Point: Body mechanics (In Progress)       Learning Progress Summary             Patient Acceptance, E, NR by KG at 8/12/2024 0918    Acceptance, E, NR by LAISHA at 8/10/2024 0730                         Point: Precautions (In Progress)       Learning Progress Summary             Patient Acceptance, E, NR by KG at 8/12/2024 0918    Acceptance, E, NR by LAISHA at 8/10/2024 0730                                         User Key       Initials Effective Dates Name Provider Type Discipline    LAISHA 02/03/23 -  Shannan Briceno, PT Physical Therapist PT    KG 05/22/20 -  Margarita Street PT Physical Therapist PT                  PT Recommendation and Plan     Plan of Care Reviewed With: patient  Progress: improving  Outcome Evaluation: Pt ambulated 50ft +50ft with Kevin x2 and B UE support on tripod monitor. Pt demonstrated slow rancho with short steps and forward flexed posture. Tendency to keep monitor too far away from body contributing to decreased balance. Pt required one standing rest break due to c/o increased SOA. Continue to recommend PT skilled care and D/C home with assistance and  PT services.     Time Calculation:         PT Charges       Row Name 08/12/24 0918             Time Calculation    Start Time 0918  -KG      PT Received On 08/12/24  -KG      PT Goal Re-Cert Due Date 08/20/24  -KG         Time Calculation- PT    Total Timed Code Minutes- PT 23 minute(s)  -KG         Timed  Charges    60741 - PT Therapeutic Activity Minutes 23  -KG         Total Minutes    Timed Charges Total Minutes 23  -KG       Total Minutes 23  -KG                User Key  (r) = Recorded By, (t) = Taken By, (c) = Cosigned By      Initials Name Provider Type    Margarita Woods, PT Physical Therapist                  Therapy Charges for Today       Code Description Service Date Service Provider Modifiers Qty    84898725498 HC PT THERAPEUTIC ACT EA 15 MIN 8/12/2024 Margarita Street, PT GP 2            PT G-Codes  Outcome Measure Options: AM-PAC 6 Clicks Basic Mobility (PT)  AM-PAC 6 Clicks Score (PT): 15  PT Discharge Summary  Anticipated Discharge Disposition (PT): home with assist, home with home health    Jennifer Street, PT  8/12/2024

## 2024-08-12 NOTE — PROGRESS NOTES
"Enter Query Response Below      Query Response: Type 1 MI due to ______ (please specify- plaque erosion, rupture, fissure or thrombus)   IVUS had it before, I know it is not your fault, but it is absolutely impossible to clinically tell which 1 of those happen.  If I have to pick 1, I would randomly guess thrombus, but again nobody ever knows the answer to this.  So lets go with thrombus           If applicable, please update the problem list.   Patient: Preet Edwards        : 1955  Account: [id number]           Admit Date:         How to Respond to this query:       a. Click New Note     b. Answer query within the yellow box.                c. Update the Problem List, if applicable.      If you have any questions about this query contact me at: [email or web address]     :    Patient with progressive dyspnea on exertion and positive troponins transferred from OSH on  for urgent heart cath.  HS Troponin 850, 827, Troponin T Delta -23.   EKG \"Poor data quality, interpretation may be adversely affected, Normal sinus rhythm, Possible Septal infarct, age undetermined, ST & T wave abnormality, consider lateral ischemia, Abnormal ECG,  No previous ECGs available.\"   Per Cardiology consult note \"ACS.  Dynamic EKG changes.  New functional class III-4 angina.  Positive troponin consistent with non-STEMI.\"   Per heart cath report \"90% ulcerated proximal LAD stenosis, involves origin of moderate-sized diagonal, 80% proximal left circumflex stenosis. Small subtotally occluded OM1, Chronically occluded proximal RCA with 3+ collaterals to the large right PDA.\"   S/p Cabg x 3 with AVR.    Please clarify the type of MI the patient was treated/monitored for:    Type 1 MI due to ______ (please specify- plaque erosion, rupture, fissure or thrombus)  Type 2 MI due to fixed CAD  Other- specify______  Unable to determine    By submitting this query, we are merely seeking further clarification of documentation " to accurately reflect all conditions that you are monitoring, evaluating, treating or that extend the hospitalization or utilize additional resources of care. Please utilize your independent clinical judgment when addressing the question(s) above.     This query and your response, once completed, will be entered into the legal medical record.    Sincerely,  Lisa Maddox RN  Clinical Documentation Integrity Program

## 2024-08-12 NOTE — PLAN OF CARE
Goal Outcome Evaluation:  Plan of Care Reviewed With: patient        Progress: improving  Outcome Evaluation: Pt ambulated 50ft +50ft with Kevin x2 and B UE support on tripod monitor. Pt demonstrated slow rancho with short steps and forward flexed posture. Tendency to keep monitor too far away from body contributing to decreased balance. Pt required one standing rest break due to c/o increased SOA. Continue to recommend PT skilled care and D/C home with assistance and  PT services.      Anticipated Discharge Disposition (PT): home with assist, home with home health

## 2024-08-12 NOTE — CASE MANAGEMENT/SOCIAL WORK
Continued Stay Note  [facility]     Patient Name: Preet Edwards  MRN: [id number]  Today's Date: 8/12/2024    Admit Date: 8/7/2024    Plan: Home   Discharge Plan       Row Name 08/12/24 1140       Plan    Plan Home    Patient/Family in Agreement with Plan yes    Plan Comments Spoke with patient at bedside. Denies any discharge needs at this time. Per MD note Naeem remains to suction. Not medically ready for discharge. CM will cont to follow.    Final Discharge Disposition Code 01 - home or self-care                   Discharge Codes    No documentation.                       Riri Gautam RN

## 2024-08-13 LAB
GLUCOSE BLDC GLUCOMTR-MCNC: 101 MG/DL (ref 70–130)
GLUCOSE BLDC GLUCOMTR-MCNC: 109 MG/DL (ref 70–130)
GLUCOSE BLDC GLUCOMTR-MCNC: 115 MG/DL (ref 70–130)
GLUCOSE BLDC GLUCOMTR-MCNC: 135 MG/DL (ref 70–130)

## 2024-08-13 PROCEDURE — 94761 N-INVAS EAR/PLS OXIMETRY MLT: CPT

## 2024-08-13 PROCEDURE — 99232 SBSQ HOSP IP/OBS MODERATE 35: CPT | Performed by: INTERNAL MEDICINE

## 2024-08-13 PROCEDURE — 82948 REAGENT STRIP/BLOOD GLUCOSE: CPT

## 2024-08-13 PROCEDURE — 97530 THERAPEUTIC ACTIVITIES: CPT

## 2024-08-13 PROCEDURE — 94799 UNLISTED PULMONARY SVC/PX: CPT

## 2024-08-13 RX ORDER — VALSARTAN 160 MG/1
80 TABLET ORAL
Status: DISCONTINUED | OUTPATIENT
Start: 2024-08-13 | End: 2024-08-15 | Stop reason: HOSPADM

## 2024-08-13 RX ORDER — LACTULOSE 10 G/15ML
20 SOLUTION ORAL 3 TIMES DAILY PRN
Status: DISCONTINUED | OUTPATIENT
Start: 2024-08-13 | End: 2024-08-15 | Stop reason: HOSPADM

## 2024-08-13 RX ADMIN — LACTULOSE 20 G: 20 SOLUTION ORAL at 14:44

## 2024-08-13 RX ADMIN — Medication 10 ML: at 08:37

## 2024-08-13 RX ADMIN — VALSARTAN 80 MG: 80 TABLET, FILM COATED ORAL at 11:18

## 2024-08-13 RX ADMIN — IPRATROPIUM BROMIDE AND ALBUTEROL SULFATE 3 ML: 2.5; .5 SOLUTION RESPIRATORY (INHALATION) at 17:29

## 2024-08-13 RX ADMIN — CARVEDILOL 6.25 MG: 6.25 TABLET, FILM COATED ORAL at 17:42

## 2024-08-13 RX ADMIN — ASPIRIN 81 MG: 81 TABLET, COATED ORAL at 08:36

## 2024-08-13 RX ADMIN — IPRATROPIUM BROMIDE AND ALBUTEROL SULFATE 3 ML: 2.5; .5 SOLUTION RESPIRATORY (INHALATION) at 20:38

## 2024-08-13 RX ADMIN — Medication 10 ML: at 20:51

## 2024-08-13 RX ADMIN — HYDROCODONE BITARTRATE AND ACETAMINOPHEN 1 TABLET: 7.5; 325 TABLET ORAL at 20:50

## 2024-08-13 RX ADMIN — CARVEDILOL 6.25 MG: 6.25 TABLET, FILM COATED ORAL at 08:36

## 2024-08-13 RX ADMIN — ROSUVASTATIN CALCIUM 20 MG: 20 TABLET, COATED ORAL at 20:50

## 2024-08-13 RX ADMIN — HYDROCODONE BITARTRATE AND ACETAMINOPHEN 1 TABLET: 7.5; 325 TABLET ORAL at 05:27

## 2024-08-13 RX ADMIN — COLCHICINE 0.6 MG: 0.6 TABLET ORAL at 08:36

## 2024-08-13 NOTE — CASE MANAGEMENT/SOCIAL WORK
Continued Stay Note   [place]     Patient Name: Preet Edwards  MRN: [id number]  Today's Date: 8/13/2024    Admit Date: 8/7/2024    Plan: Home HH   Discharge Plan       Row Name 08/13/24 0952       Plan    Plan Home HH    Patient/Family in Agreement with Plan yes    Plan Comments Spoke with adrianna at bedside for therapy recs for HH with patient agreeable. No preference but only 2 providers in his area with referrals faxed to both. CT remains in place, not medicaly ready for discharge. CM will cont to follow.    Final Discharge Disposition Code 06 - home with home health care                   Discharge Codes    No documentation.                       Riri Gautam RN

## 2024-08-13 NOTE — PROGRESS NOTES
"  [facility]   Inpatient Progress Note       LOS: 6 days   Patient Care Team:  Edgard Romero MD as PCP - General (Family Medicine)    Chief Complaint: Follow-up for NSTEMI    Subjective     Interval History:     Patient in bed. Notes typical postoperative discomfort.  Tired, though resting comfortably.  Off oxygen    Review of Systems:   Pertinent positives noted in history, exam, and assessment. Otherwise reviewed and negative.      Objective     Vitals:  Blood pressure 129/64, pulse 76, temperature 99.3 °F (37.4 °C), temperature source Oral, resp. rate 20, height 172.7 cm (67.99\"), weight 75.7 kg (166 lb 14.4 oz), SpO2 91%.     Intake/Output Summary (Last 24 hours) at 8/13/2024 0933  Last data filed at 8/13/2024 0400  Gross per 24 hour   Intake --   Output 1605 ml   Net -1605 ml     Vitals reviewed.   Constitutional:       Appearance: Well-developed and not in distress.   Neck:      Vascular: No JVD.      Trachea: No tracheal deviation.   Pulmonary:      Effort: Pulmonary effort is normal.      Breath sounds: No rales.      Comments: Decreased bilaterally  Cardiovascular:      Normal rate. Regular rhythm.      Murmurs: There is no murmur.      Comments: Right radial access site benign  Pulses:     Intact distal pulses.   Edema:     Peripheral edema present.     Pretibial: trace edema of the left pretibial area.     Ankle: trace edema of the left ankle.  Musculoskeletal:         General: No deformity. Skin:     General: Skin is warm and dry.   Neurological:      Mental Status: Alert and oriented to person, place, and time.            Results Review:     I reviewed the patient's new clinical results.    Results from last 7 days   Lab Units 08/12/24  0407   WBC 10*3/mm3 8.39   HEMOGLOBIN g/dL 9.6*   HEMATOCRIT % 29.8*   PLATELETS 10*3/mm3 113*     Results from last 7 days   Lab Units 08/12/24  0546 08/10/24  0836 08/10/24  0504   SODIUM mmol/L 139   < > 144   POTASSIUM mmol/L 5.0   " < > 6.0*   CHLORIDE mmol/L 108*   < > 113*   CO2 mmol/L 23.0   < > 21.0*   BUN mg/dL 38*   < > 24*   CREATININE mg/dL 1.09   < > 1.88*   CALCIUM mg/dL 8.6   < > 7.9*   BILIRUBIN mg/dL  --   --  1.7*   ALK PHOS U/L  --   --  22*   ALT (SGPT) U/L  --   --  14   AST (SGOT) U/L  --   --  36   GLUCOSE mg/dL 125*   < > 215*    < > = values in this interval not displayed.     Results from last 7 days   Lab Units 08/12/24  0546   SODIUM mmol/L 139   POTASSIUM mmol/L 5.0   CHLORIDE mmol/L 108*   CO2 mmol/L 23.0   BUN mg/dL 38*   CREATININE mg/dL 1.09   GLUCOSE mg/dL 125*   CALCIUM mg/dL 8.6     Results from last 7 days   Lab Units 08/09/24  1809 08/07/24  2034   INR  1.79* 1.28*     Lab Results   Lab Value Date/Time    TROPONINT 827 (C) 08/08/2024 0826    TROPONINT 850 (C) 08/08/2024 0325     Results from last 7 days   Lab Units 08/08/24  0826 08/08/24  0325   TSH uIU/mL  --  4.230*   FREE T4 ng/dL 1.64  --      Results from last 7 days   Lab Units 08/09/24  1809   CHOLESTEROL mg/dL 80   TRIGLYCERIDES mg/dL 72   HDL CHOL mg/dL 15*   LDL CHOL mg/dL 49         Results from last 7 days   Lab Units 08/08/24  0826 08/08/24  0325   HSTROP T ng/L 827* 850*     LHC:    Multivessel coronary artery disease.    90% ulcerated proximal LAD stenosis, involves origin of moderate-sized diagonal    80% proximal left circumflex stenosis.    Small subtotally occluded OM1    Chronically occluded proximal RCA with 3+ collaterals to the large right PDA    LVEF 30%    Likely critical aortic stenosis with peak to peak gradient of 46 mmHg.  In the setting of severe LV dysfunction this is likely critical aortic stenosis    Elevated LVEDP of 38    Tele:  SR        Assessment:      NSTEMI (non-ST elevated myocardial infarction)    I25.10, CABG + AVR 08/09/24    Ischemic cardiomyopathy    Severe aortic stenosis    Bilateral carotid artery stenosis (>70%)    Former smoker    Dyslipidemia      Non-STEMI.  [facility] 8/7/2024 MV CAD  CABG 8/9/24 Dr. Paige LOCKHART  to LAD and VG to diag and OM sequentially  Ischemic cardiomyopathy  EF 30% by LV gram  BB held due to marginal BP  Critical aortic stenosis  Peak gradient 46 mmHg in setting of severe LV dysfunction.  AVR with 23 mm Bovine Tapia  Dyslipidemia  Carotid artery disease  Bilateral >70% ICA disease  Acute HFrEF  Remote tobacco use resolved 14 years ago  COPD  Elevated TSH    Plan:  Patient overall CV stable.  Recheck echocardiogram prior to discharge to evaluate LVEF  Increase Coreg to 6.25 mg twice daily.  Start ARB today.  If tolerates can switch to Entresto at later date.  Continue to monitor rhythm.  Cardiology will continue to follow.    Logan Calix MD    Dictated utilizing Dragon dictation

## 2024-08-13 NOTE — THERAPY TREATMENT NOTE
Patient Name: Preet Edwards  : 1955    MRN: [id number]                              Today's Date: 2024       Admit Date: 2024    Visit Dx:     ICD-10-CM ICD-9-CM   1. NSTEMI (non-ST elevated myocardial infarction)  I21.4 410.70   2. Coronary artery disease involving native heart, unspecified vessel or lesion type, unspecified whether angina present  I25.10 414.01     Patient Active Problem List   Diagnosis    NSTEMI (non-ST elevated myocardial infarction)    I25.10, CABG + AVR 24    Ischemic cardiomyopathy    Severe aortic stenosis    Bilateral carotid artery stenosis (>70%)    Former smoker    Dyslipidemia     Past Medical History:   Diagnosis Date    COPD (chronic obstructive pulmonary disease)     GERD (gastroesophageal reflux disease)      Past Surgical History:   Procedure Laterality Date    CARDIAC CATHETERIZATION N/A 2024    Procedure: Left Heart Cath;  Surgeon: Logan Calix MD;  Location: [facility] CATH INVASIVE LOCATION;  Service: Cardiovascular;  Laterality: N/A;    CATARACT EXTRACTION      CORONARY ARTERY BYPASS GRAFT WITH AORTIC VALVE REPAIR/REPLACEMENT N/A 2024    Procedure: CORONARY ARTERY BYPASS X 3 WITH INTERNAL MAMMARY ARTERY GRAFT AND AORTIC VALVE REPAIR/REPLACEMENT, SINDY, EVH;  Surgeon: Gage Gibson MD;  Location: [facility] OR;  Service: Cardiothoracic;  Laterality: N/A;      General Information       Row Name 24 140          Physical Therapy Time and Intention    Document Type therapy note (daily note)  -KG     Mode of Treatment physical therapy  -KG       Row Name 24          General Information    Existing Precautions/Restrictions cardiac;fall;sternal  -KG     Barriers to Rehab medically complex  -KG       Row Name 24          Cognition    Orientation Status (Cognition) oriented x 3  -KG       Row Name 24          Safety Issues, Functional Mobility    Safety Issues Affecting Function (Mobility) awareness of need for  assistance;insight into deficits/self-awareness;safety precaution awareness;safety precautions follow-through/compliance  -KG     Impairments Affecting Function (Mobility) balance;coordination;endurance/activity tolerance;postural/trunk control;pain;strength  -KG               User Key  (r) = Recorded By, (t) = Taken By, (c) = Cosigned By      Initials Name Provider Type    Margarita Woods PT Physical Therapist                   Mobility       Row Name 08/13/24 1407          Bed Mobility    Comment, (Bed Mobility) UIC  -KG       Row Name 08/13/24 1407          Transfers    Comment, (Transfers) VC's for sequencing and safe hand placement to maintain sternal precautions.  -KG       Row Name 08/13/24 1407          Sit-Stand Transfer    Sit-Stand [place] (Transfers) minimum assist (75% patient effort);2 person assist;verbal cues  -KG       Row Name 08/13/24 1407          Gait/Stairs (Locomotion)    [place] Level (Gait) minimum assist (75% patient effort);2 person assist;verbal cues  -KG     Assistive Device (Gait) other (see comments)  B UE support on tripod monitor  -KG     Distance in Feet (Gait) 220  -KG     Deviations/Abnormal Patterns (Gait) rancho decreased;stride length decreased  -KG     Bilateral Gait Deviations forward flexed posture  -KG     Comment, (Gait/Stairs) Pt demonstrated step through gait pattern with slow rancho and decreased step length. VC's for upright posture. Pt demonstrated improved balance and coordination this session. Required one standing rest break. Limited by fatigue.  -KG               User Key  (r) = Recorded By, (t) = Taken By, (c) = Cosigned By      Initials Name Provider Type    Margarita Woods PT Physical Therapist                   Obj/Interventions       Row Name 08/13/24 1410          Balance    Balance Assessment sitting static balance;standing static balance;standing dynamic balance  -KG     Static Sitting Balance standby assist  -KG      Position, Sitting Balance unsupported;sitting in chair  -KG     Static Standing Balance contact guard  -KG     Dynamic Standing Balance minimal assist  -KG     Position/Device Used, Standing Balance supported  -KG               User Key  (r) = Recorded By, (t) = Taken By, (c) = Cosigned By      Initials Name Provider Type    KG Margarita Street N, PT Physical Therapist                   Goals/Plan    No documentation.                  Clinical Impression       Row Name 08/13/24 1410          Pain    Additional Documentation Pain Scale: FACES Pre/Post-Treatment (Group)  -KG       Row Name 08/13/24 1410          Pain Scale: FACES Pre/Post-Treatment    Pain: FACES Scale, Pretreatment 2-->hurts little bit  -KG     Posttreatment Pain Rating 4-->hurts little more  -KG     Pain Location incisional  -KG     Pain Location - chest  -KG       Row Name 08/13/24 1410          Plan of Care Review    Plan of Care Reviewed With patient  -KG     Progress improving  -KG     Outcome Evaluation Pt increased ambulation distance to 220ft with Kevin x2 and B UE support on tripod monitor. Pt demonstrated improved balance and coordination this session. VC's for upright posture with increased stride length. Pt required one standing rest break due to fatigue. Continue to recommend PT skilled care and D/C home with assistance and  PT services.  -KG       Row Name 08/13/24 1410          Vital Signs    Pre Systolic BP Rehab 124  -KG     Pre Treatment Diastolic BP 57  -KG     Post Systolic BP Rehab 123  -KG     Post Treatment Diastolic BP 64  -KG     Pretreatment Heart Rate (beats/min) 82  -KG     Posttreatment Heart Rate (beats/min) 80  -KG     Pre SpO2 (%) 93  -KG     O2 Delivery Pre Treatment room air  -KG     Intra SpO2 (%) 92  -KG     O2 Delivery Intra Treatment room air  -KG     Post SpO2 (%) 94  -KG     O2 Delivery Post Treatment room air  -KG     Pre Patient Position Sitting  -KG     Intra Patient Position Standing  -KG     Post  Patient Position Sitting  -KG       Row Name 08/13/24 1410          Positioning and Restraints    Pre-Treatment Position sitting in chair/recliner  -KG     Post Treatment Position chair  -KG     In Chair notified nsg;reclined;call light within reach;encouraged to call for assist;RUE elevated;LUE elevated;legs elevated  -KG               User Key  (r) = Recorded By, (t) = Taken By, (c) = Cosigned By      Initials Name Provider Type    Margarita Woods PT Physical Therapist                   Outcome Measures       Row Name 08/13/24 1413          How much help from another person do you currently need...    Turning from your back to your side while in flat bed without using bedrails? 2  -KG     Moving from lying on back to sitting on the side of a flat bed without bedrails? 2  -KG     Moving to and from a bed to a chair (including a wheelchair)? 3  -KG     Standing up from a chair using your arms (e.g., wheelchair, bedside chair)? 3  -KG     Climbing 3-5 steps with a railing? 2  -KG     To walk in hospital room? 3  -KG     AM-PAC 6 Clicks Score (PT) 15  -KG     Highest Level of Mobility Goal 4 --> Transfer to chair/commode  -KG       Row Name 08/13/24 1413          Functional Assessment    Outcome Measure Options AM-PAC 6 Clicks Basic Mobility (PT)  -KG               User Key  (r) = Recorded By, (t) = Taken By, (c) = Cosigned By      Initials Name Provider Type    Margarita Woods PT Physical Therapist                                 Physical Therapy Education       Title: PT OT SLP Therapies (In Progress)       Topic: Physical Therapy (In Progress)       Point: Mobility training (In Progress)       Learning Progress Summary             Patient Acceptance, E, NR by KG at 8/13/2024 0923    Acceptance, E, NR by KG at 8/12/2024 0918    Acceptance, E, NR by LAISHA at 8/10/2024 0730                         Point: Home exercise program (In Progress)       Learning Progress Summary             Patient  Acceptance, E, NR by KG at 8/13/2024 0923    Acceptance, E, NR by KG at 8/12/2024 0918    Acceptance, E, NR by LAISHA at 8/10/2024 0730                         Point: Body mechanics (In Progress)       Learning Progress Summary             Patient Acceptance, E, NR by KG at 8/13/2024 0923    Acceptance, E, NR by KG at 8/12/2024 0918    Acceptance, E, NR by LAISHA at 8/10/2024 0730                         Point: Precautions (In Progress)       Learning Progress Summary             Patient Acceptance, E, NR by KG at 8/13/2024 0923    Acceptance, E, NR by KG at 8/12/2024 0918    Acceptance, E, NR by LAISHA at 8/10/2024 0730                                         User Key       Initials Effective Dates Name Provider Type Discipline    LAISHA 02/03/23 -  Shannan Briceno, PT Physical Therapist PT    KG 05/22/20 -  Margarita Street PT Physical Therapist PT                  PT Recommendation and Plan     Plan of Care Reviewed With: patient  Progress: improving  Outcome Evaluation: Pt increased ambulation distance to 220ft with Kevin x2 and B UE support on tripod monitor. Pt demonstrated improved balance and coordination this session. VC's for upright posture with increased stride length. Pt required one standing rest break due to fatigue. Continue to recommend PT skilled care and D/C home with assistance and  PT services.     Time Calculation:         PT Charges       Row Name 08/13/24 0923             Time Calculation    Start Time 0923  -KG      PT Received On 08/13/24  -KG      PT Goal Re-Cert Due Date 08/20/24  -KG         Time Calculation- PT    Total Timed Code Minutes- PT 23 minute(s)  -KG         Timed Charges    42539 - PT Therapeutic Activity Minutes 23  -KG         Total Minutes    Timed Charges Total Minutes 23  -KG       Total Minutes 23  -KG                User Key  (r) = Recorded By, (t) = Taken By, (c) = Cosigned By      Initials Name Provider Type    KG Margarita Street, PT Physical Therapist                   Therapy Charges for Today       Code Description Service Date Service Provider Modifiers Qty    44965286200 HC PT THERAPEUTIC ACT EA 15 MIN 8/12/2024 Margarita Street, PT GP 2    41489507476 HC PT THERAPEUTIC ACT EA 15 MIN 8/13/2024 Margarita Street, PT GP 2            PT G-Codes  Outcome Measure Options: AM-PAC 6 Clicks Basic Mobility (PT)  AM-PAC 6 Clicks Score (PT): 15  PT Discharge Summary  Anticipated Discharge Disposition (PT): home with assist, home with home health    Jennifer Street, PT  8/13/2024

## 2024-08-13 NOTE — DISCHARGE PLACEMENT REQUEST
"Marshall Edwards (68 y.o. Male)       Date of Birth   1955    Social Security Number       Address   [address] [place] [address]    Home Phone   [phone]    MRN   [id number]       Congregational   Unknown    Marital Status                               Admission Date   24    Admission Type   Urgent    Admitting Provider   Logan Claix MD    Attending Provider   Gage Gibson MD    Department, Room/Bed   [address], S262/1       Discharge Date       Discharge Disposition       Discharge Destination                                 Attending Provider: Gage Gibson MD    Allergies: No Known Allergies    Isolation: None   Infection: None   Code Status: CPR    Ht: 172.7 cm (67.99\")   Wt: 75.7 kg (166 lb 14.4 oz)    Admission Cmt: None   Principal Problem: NSTEMI (non-ST elevated myocardial infarction) [I21.4]                   Active Insurance as of 2024       Primary Coverage       Payor Plan Insurance Group Employer/Plan Group    ANTHEM BLUE CROSS [facility] CROSS BLUE SHIELD O [id number]       Payor Plan Address Payor Plan Phone Number Payor Plan Fax Number Effective Dates    [address] [phone]  2020 - None Entered    [place] [address]         Subscriber Name Subscriber Birth Date Member ID       MARSHALL EDWARDS 1955 [id number]                     Emergency Contacts        (Rel.) Home Phone Work Phone Mobile Phone    LARRY EDWARDS (Spouse) -- -- [phone]                 Physical Therapy Notes (most recent note)        Margarita Street, PT at 24 0918  Version 1 of 1         Patient Name: Marshall Edwards  : 1955    MRN: [id number]                              Today's Date: 2024       Admit Date: 2024    Visit Dx:     ICD-10-CM ICD-9-CM   1. NSTEMI (non-ST elevated myocardial infarction)  I21.4 410.70   2. Coronary artery disease involving native heart, unspecified vessel or lesion type, " unspecified whether angina present  I25.10 414.01     Patient Active Problem List   Diagnosis    NSTEMI (non-ST elevated myocardial infarction)    I25.10, CABG + AVR 08/09/24    Ischemic cardiomyopathy    Severe aortic stenosis    Bilateral carotid artery stenosis (>70%)    Former smoker    Dyslipidemia     Past Medical History:   Diagnosis Date    COPD (chronic obstructive pulmonary disease)     GERD (gastroesophageal reflux disease)      Past Surgical History:   Procedure Laterality Date    CARDIAC CATHETERIZATION N/A 8/7/2024    Procedure: Left Heart Cath;  Surgeon: Logan Calix MD;  Location: [facility] CATH INVASIVE LOCATION;  Service: Cardiovascular;  Laterality: N/A;    CATARACT EXTRACTION      CORONARY ARTERY BYPASS GRAFT WITH AORTIC VALVE REPAIR/REPLACEMENT N/A 8/9/2024    Procedure: CORONARY ARTERY BYPASS X 3 WITH INTERNAL MAMMARY ARTERY GRAFT AND AORTIC VALVE REPAIR/REPLACEMENT, SINDY, EVH;  Surgeon: Gage Gibson MD;  Location: [facility] OR;  Service: Cardiothoracic;  Laterality: N/A;      General Information       Row Name 08/12/24 1417          Physical Therapy Time and Intention    Document Type therapy note (daily note)  -KG     Mode of Treatment physical therapy  -KG       Row Name 08/12/24 1417          General Information    Existing Precautions/Restrictions cardiac;fall;sternal  -KG     Barriers to Rehab medically complex  -KG       Row Name 08/12/24 1417          Cognition    Orientation Status (Cognition) oriented x 3  -KG       Row Name 08/12/24 1417          Safety Issues, Functional Mobility    Safety Issues Affecting Function (Mobility) awareness of need for assistance;insight into deficits/self-awareness;safety precaution awareness;safety precautions follow-through/compliance  -KG     Impairments Affecting Function (Mobility) balance;coordination;endurance/activity tolerance;pain;postural/trunk control;shortness of breath;strength  -KG               User Key  (r) = Recorded By, (t) = Taken  By, (c) = Cosigned By      Initials Name Provider Type    KG Margarita Street PT Physical Therapist                   Mobility       Row Name 08/12/24 1418          Bed Mobility    Bed Mobility sit-supine  -KG     Sit-Supine [place] (Bed Mobility) maximum assist (25% patient effort);2 person assist;verbal cues  -KG     Assistive Device (Bed Mobility) head of bed elevated  -KG     Comment, (Bed Mobility) VC's for sequencing and safe hand placement to maintain sternal precautions. Pt required increased assistance at trunk and BLEs.  -KG       Row Name 08/12/24 1418          Transfers    Comment, (Transfers) VC's for sequencing and safe hand placement to maintain sternal precautions.  -KG       Row Name 08/12/24 1418          Sit-Stand Transfer    Sit-Stand [place] (Transfers) minimum assist (75% patient effort);2 person assist;verbal cues  -KG       Row Name 08/12/24 1418          Gait/Stairs (Locomotion)    [place] Level (Gait) minimum assist (75% patient effort);2 person assist;verbal cues  -KG     Assistive Device (Gait) other (see comments)  B UE support on tripod monitor  -KG     Distance in Feet (Gait) 50  +50  -KG     Deviations/Abnormal Patterns (Gait) rancho decreased;stride length decreased  -KG     Bilateral Gait Deviations forward flexed posture  -KG     Comment, (Gait/Stairs) Pt demonstrated step through gait pattern with slow rancho and decreased step length. VC's for upright posture and to keep monitor close with feet inside middle. Pt with periods of decreased balance and coordination. Pt required one prolonged standing rest break due to increased SOA. Declined additional ambulation.  -KG               User Key  (r) = Recorded By, (t) = Taken By, (c) = Cosigned By      Initials Name Provider Type    Margarita Woods, PT Physical Therapist                   Obj/Interventions       Row Name 08/12/24 1419          Balance    Balance Assessment sitting static balance;standing  static balance;standing dynamic balance  -KG     Static Sitting Balance contact guard  -KG     Position, Sitting Balance unsupported;sitting in chair  -KG     Static Standing Balance minimal assist  -KG     Dynamic Standing Balance minimal assist;2-person assist  -KG     Position/Device Used, Standing Balance supported  -KG               User Key  (r) = Recorded By, (t) = Taken By, (c) = Cosigned By      Initials Name Provider Type    KG Margarita Street, PT Physical Therapist                   Goals/Plan    No documentation.                  Clinical Impression       Row Name 08/12/24 1420          Pain    Additional Documentation Pain Scale: FACES Pre/Post-Treatment (Group)  -KG       Row Name 08/12/24 1420          Pain Scale: FACES Pre/Post-Treatment    Pain: FACES Scale, Pretreatment 4-->hurts little more  -KG     Posttreatment Pain Rating 6-->hurts even more  -KG     Pain Location incisional  -KG     Pain Location - chest  -KG       Row Name 08/12/24 1420          Plan of Care Review    Plan of Care Reviewed With patient  -KG     Progress improving  -KG     Outcome Evaluation Pt ambulated 50ft +50ft with Kevin x2 and B UE support on tripod monitor. Pt demonstrated slow rancho with short steps and forward flexed posture. Tendency to keep monitor too far away from body contributing to decreased balance. Pt required one standing rest break due to c/o increased SOA. Continue to recommend PT skilled care and D/C home with assistance and  PT services.  -KG       Row Name 08/12/24 1420          Vital Signs    Pre Systolic BP Rehab 133  -KG     Pre Treatment Diastolic BP 64  -KG     Post Systolic BP Rehab 148  -KG     Post Treatment Diastolic BP 81  -KG     Pretreatment Heart Rate (beats/min) 96  -KG     Posttreatment Heart Rate (beats/min) 97  -KG     Pre SpO2 (%) 90  -KG     O2 Delivery Pre Treatment room air  -KG     Intra SpO2 (%) 83  -KG     O2 Delivery Intra Treatment room air  -KG     Post SpO2 (%) 97   -KG     O2 Delivery Post Treatment supplemental O2  -KG     Pre Patient Position Sitting  -KG     Intra Patient Position Standing  -KG     Post Patient Position Supine  -KG       Row Name 08/12/24 1420          Positioning and Restraints    Pre-Treatment Position sitting in chair/recliner  -KG     Post Treatment Position bed  -KG     In Bed notified nsg;supine;call light within reach;encouraged to call for assist;side rails up x2;RUE elevated;LUE elevated  -KG               User Key  (r) = Recorded By, (t) = Taken By, (c) = Cosigned By      Initials Name Provider Type    Margarita Woods, PT Physical Therapist                   Outcome Measures       Row Name 08/12/24 1421          How much help from another person do you currently need...    Turning from your back to your side while in flat bed without using bedrails? 2  -KG     Moving from lying on back to sitting on the side of a flat bed without bedrails? 2  -KG     Moving to and from a bed to a chair (including a wheelchair)? 3  -KG     Standing up from a chair using your arms (e.g., wheelchair, bedside chair)? 3  -KG     Climbing 3-5 steps with a railing? 2  -KG     To walk in hospital room? 3  -KG     AM-PAC 6 Clicks Score (PT) 15  -KG     Highest Level of Mobility Goal 4 --> Transfer to chair/commode  -KG       Row Name 08/12/24 1421          Functional Assessment    Outcome Measure Options AM-PAC 6 Clicks Basic Mobility (PT)  -KG               User Key  (r) = Recorded By, (t) = Taken By, (c) = Cosigned By      Initials Name Provider Type    Margarita Woods, ENRIQUE Physical Therapist                                 Physical Therapy Education       Title: PT OT SLP Therapies (In Progress)       Topic: Physical Therapy (In Progress)       Point: Mobility training (In Progress)       Learning Progress Summary             Patient Acceptance, E, NR by KG at 8/12/2024 0918    Acceptance, E, NR by LAISHA at 8/10/2024 0730                         Point:  Home exercise program (In Progress)       Learning Progress Summary             Patient Acceptance, E, NR by KG at 8/12/2024 0918    Acceptance, E, NR by LAISHA at 8/10/2024 0730                         Point: Body mechanics (In Progress)       Learning Progress Summary             Patient Acceptance, E, NR by KG at 8/12/2024 0918    Acceptance, E, NR by LAISHA at 8/10/2024 0730                         Point: Precautions (In Progress)       Learning Progress Summary             Patient Acceptance, E, NR by KG at 8/12/2024 0918    Acceptance, E, NR by LAISHA at 8/10/2024 0730                                         User Key       Initials Effective Dates Name Provider Type Discipline    LAISHA 02/03/23 -  Shannan Briceno PT Physical Therapist PT    KG 05/22/20 -  Margarita Street, PT Physical Therapist PT                  PT Recommendation and Plan     Plan of Care Reviewed With: patient  Progress: improving  Outcome Evaluation: Pt ambulated 50ft +50ft with Kevin x2 and B UE support on tripod monitor. Pt demonstrated slow rancho with short steps and forward flexed posture. Tendency to keep monitor too far away from body contributing to decreased balance. Pt required one standing rest break due to c/o increased SOA. Continue to recommend PT skilled care and D/C home with assistance and  PT services.     Time Calculation:         PT Charges       Row Name 08/12/24 0918             Time Calculation    Start Time 0918  -KG      PT Received On 08/12/24  -KG      PT Goal Re-Cert Due Date 08/20/24  -KG         Time Calculation- PT    Total Timed Code Minutes- PT 23 minute(s)  -KG         Timed Charges    87034 - PT Therapeutic Activity Minutes 23  -KG         Total Minutes    Timed Charges Total Minutes 23  -KG       Total Minutes 23  -KG                User Key  (r) = Recorded By, (t) = Taken By, (c) = Cosigned By      Initials Name Provider Type    KG Margarita Street, PT Physical Therapist                  Therapy  Charges for Today       Code Description Service Date Service Provider Modifiers Qty    08280050558 HC PT THERAPEUTIC ACT EA 15 MIN 2024 Margarita Street, PT GP 2            PT G-Codes  Outcome Measure Options: AM-PAC 6 Clicks Basic Mobility (PT)  AM-PAC 6 Clicks Score (PT): 15  PT Discharge Summary  Anticipated Discharge Disposition (PT): home with assist, home with home health    Jennifer Street, PT  2024      Electronically signed by Margarita Street, PT at 24 [facility]        [address]  [address] [facility] [address]  Phone:  [phone]  Fax:  [phone] Date: Aug 13, 2024      Ambulatory Referral to Home Health     Patient:  Preet Edwards MRN:  [id number]   [address] [facility] [address] :  1955  SSN:    Phone: [phone] Sex:  M      INSURANCE PAYOR PLAN GROUP # SUBSCRIBER ID   Primary:    [place] [id number]      Referring Provider Information:  MARISOL YOUSSEF Phone: [phone] Fax: [phone]       Referral Information:   # Visits:  999 Referral Type: Home Health [42]   Urgency:  Routine Referral Reason: Specialty Services Required   Start Date: Aug 13, 2024 End Date:  To be determined by Insurer   Diagnosis: NSTEMI (non-ST elevated myocardial infarction) (I21.4 [ICD-10-CM] 410.70 [ICD-9-CM])  Coronary artery disease involving native heart, unspecified vessel or lesion type, unspecified whether angina present (I25.10 [ICD-10-CM] 414.01 [ICD-9-CM])      Refer to Dept:   Refer to Provider:   Refer to Provider Phone:   Refer to Facility:       Face to Face Visit Date: 2024  Follow-up provider for Plan of Care? I treated the patient in an acute care facility and will not continue treatment after discharge.  Follow-up provider: RAUL MAYER [[id number]]  Reason/Clinical Findings: NSTEMI  Describe mobility limitations that make leaving home difficult: impaired functional mobility,  gait, balance, and endurance  Nursing/Therapeutic Services Requested: Physical Therapy  Nursing/Therapeutic Services Requested: Occupational Therapy  PT orders: Home safety assessment  PT orders: Strengthening  Occupational orders: Home safety assessment  Occupational orders: Strengthening  Frequency: 1 Week 1     This document serves as a request of services and does not constitute Insurance authorization or approval of services.  To determine eligibility, please contact the members Insurance carrier to verify and review coverage.     If you have medical questions regarding this request for services. Please contact [address] at [phone] during normal business hours.        Verbal Order Mode: Telephone with readback   Authorizing Provider: Gage Gibson MD  Authorizing Provider's NPI: [id number]     Order Entered By: Riri Gautam RN 8/13/2024  9:50 AM     Electronically signed by:  Gage Gibson MD

## 2024-08-13 NOTE — PROGRESS NOTES
INTENSIVIST   PROGRESS NOTE     Hospital:  LOS: 6 days     Mr. Preet Edwards, 68 y.o. male is followed for a Chief Complaint of: Glycemic, Electrolyte, Respiratory, and Medical management        Subjective   S     Interval History:  POD: 4 Days Post-Op CABG 08/09/24    Doing well.    Awaiting Telemetry Bed.    Temp  Min: 97.8 °F (36.6 °C)  Max: 99.3 °F (37.4 °C)      The patient's relevant past medical, surgical and social history were reviewed and updated in Epic as appropriate.        History     Last Reviewed by Luis E Weinstein MD on 8/12/2024 at  8:49 AM    Sections Reviewed    Medical, Surgical, Family, Tobacco, Alcohol, Drug Use, Sexual Activity,   Social Documentation    Problem list reviewed by Luis E Weinstein MD on 8/12/2024 at  8:48 AM  Medicines reviewed by Luis E Weinstein MD on 8/12/2024 at  8:48 AM  Allergies reviewed by Luis E Weinstein MD on 8/12/2024 at  8:48 AM     Objective   O     Vitals:  Temp  Min: 97.8 °F (36.6 °C)  Max: 99.3 °F (37.4 °C)  BP  Min: 97/53  Max: 149/69  Pulse  Min: 71  Max: 99  Resp  Min: 18  Max: 26  SpO2  Min: 91 %  Max: 99 % Flow (L/min)  Min: 2  Max: 2      Physical Examination    Physical Examination  Telemetry:  Rhythm: normal sinus rhythm (08/13/24 0600)      Constitutional:  No acute distress.   Cardiovascular: RRR.    Respiratory: Normal breath sounds  No adventitious sounds   Abdominal:  Soft with no tenderness.   Extremities: No Edema   Neurological:   Alert, Oriented, Cooperative.  Best Eye Response: 4-->(E4) spontaneous (08/13/24 0600)  Best Motor Response: 6-->(M6) obeys commands (08/13/24 0600)  Best Verbal Response: 5-->(V5) oriented (08/13/24 0600)  [place] Coma Scale Score: 15 (08/13/24 0600)      Results Reviewed:  Laboratory  Microbiology  Radiology  Pathology    Hematology:  Results from last 7 days   Lab Units 08/12/24  0407 08/11/24  1418 08/11/24  0403   WBC 10*3/mm3 8.39 8.87 10.64   HEMOGLOBIN g/dL 9.6* 9.4* 8.3*   MCV fL 92.5 92.8 95.0   PLATELETS  10*3/mm3 113* 90* 91*     Results from last 7 days   Lab Units 08/10/24  0504 08/08/24  0325 08/07/24  2034   NEUTROS ABS 10*3/mm3 9.92* 3.50 4.32   LYMPHS ABS 10*3/mm3 1.03 2.12 2.68   EOS ABS 10*3/mm3 0.01 0.33 0.27      Chemistry:  Estimated Creatinine Clearance: 71.4 mL/min (by C-G formula based on SCr of 1.09 mg/dL).    Results from last 7 days   Lab Units 08/12/24  0546 08/11/24  1418   SODIUM mmol/L 139 137   POTASSIUM mmol/L 5.0 5.1   CHLORIDE mmol/L 108* 106   CO2 mmol/L 23.0 20.0*   BUN mg/dL 38* 30*   CREATININE mg/dL 1.09 1.30*   GLUCOSE mg/dL 125* 132*     Results from last 7 days   Lab Units 08/12/24  0546 08/12/24  0407 08/11/24  1418 08/11/24  0403 08/10/24  0836 08/10/24  0504   CALCIUM mg/dL 8.6  --  8.3*   < >  --  7.9*   MAGNESIUM mg/dL  --  2.8*  --   --   --  3.8*   PHOSPHORUS mg/dL  --  2.6  --   --  4.7*  --     < > = values in this interval not displayed.     Lab Results   Component Value Date    TSH 4.230 (H) 08/08/2024    FREET4 1.64 08/08/2024      Images:  XR Chest 1 View    Result Date: 8/12/2024  Impression: Left-sided chest tube in place with no pneumothorax. Left basilar atelectasis and left pleural effusion. Electronically Signed: Sam Mckee MD  8/12/2024 7:02 AM EDT  Workstation ID: [id number]    XR Chest 1 View    Result Date: 8/11/2024  Impression: Cardiomegaly with small bilateral pleural effusions. Mild right basilar atelectasis and left upper lobe atelectasis. Electronically Signed: Sam Mckee MD  8/11/2024 10:46 PM EDT  Workstation ID: [id number]     Echo:  Results for orders placed during the hospital encounter of 08/07/24    Adult Transthoracic Echo Complete W/ Cont if Necessary Per Protocol    Interpretation Summary    Left ventricular systolic function is moderately decreased. Estimated left ventricular EF = 35%    Left ventricular wall thickness is consistent with borderline concentric hypertrophy.    The following left ventricular wall segments are hypokinetic: mid  anterior, apical anterior, apical lateral, apical septal and apex hypokinetic.    Severe aortic valve stenosis is present.    Aortic valve maximum pressure gradient is 67 mmHg. Aortic valve mean pressure gradient is 42 mmHg.    Results: Reviewed.  I reviewed the patient's new laboratory and imaging results.  I independently reviewed the patient's new images.    Medications: Reviewed.      Assessment & Plan   A / P     Mr. Edwards is a 69yo M former smoker who came on 8/7/24 as a field STEMI.    His LHC showed multi-vessel disease. (Dr. Calix). Echocardiogram revealed severe aortic stenosis.     He underwent CABG x 3 and AVR with a bovine Tapia valve by Dr. Gibson on 8/9/24.     He was extubated on the morning of 8/10/24.     Nutrition:   Diet: Cardiac, Diabetic, Regular/House; Healthy Heart (2-3 Na+); Consistent Carbohydrate; Fluid Consistency: Thin (IDDSI 0)    Advance Directives:   Code Status and Medical Interventions: CPR (Attempt to Resuscitate); Full Support   Ordered at: 08/09/24 1804     Level Of Support Discussed With:    Patient     Code Status (Patient has no pulse and is not breathing):    CPR (Attempt to Resuscitate)     Medical Interventions (Patient has pulse or is breathing):    Full Support       Active Hospital Problems    Diagnosis     **NSTEMI (non-ST elevated myocardial infarction)     Ischemic cardiomyopathy     Severe aortic stenosis     Bilateral carotid artery stenosis (>70%)     Former smoker     Dyslipidemia     I25.10, CABG + AVR 08/09/24      Assessment / Plan:    Improving.  Continue Left Naeem on suction as per CT Surgery.  Disposition: Telemetry.      Plan of care and goals reviewed during interdisciplinary rounds.  I discussed the patient's findings and my recommendations with patient, family, and nursing staff

## 2024-08-13 NOTE — PLAN OF CARE
Goal Outcome Evaluation:  Plan of Care Reviewed With: patient        Progress: improving  Outcome Evaluation: Pt increased ambulation distance to 220ft with Kevin x2 and B UE support on tripod monitor. Pt demonstrated improved balance and coordination this session. VC's for upright posture with increased stride length. Pt required one standing rest break due to fatigue. Continue to recommend PT skilled care and D/C home with assistance and  PT services.      Anticipated Discharge Disposition (PT): home with assist, home with home health

## 2024-08-13 NOTE — PROGRESS NOTES
CTS Progress Note      POD 4 s/p AVR CABG X 3       LOS: 6 days   Patient Care Team:  Edgard Romero MD as PCP - General (Family Medicine)    Subjective  Sitting in chair    Objective    Vital Signs  Temp:  [97.8 °F (36.6 °C)-99.3 °F (37.4 °C)] 99.3 °F (37.4 °C)  Heart Rate:  [71-99] 77  Resp:  [18-26] 20  BP: ()/(52-75) 129/55    Physical Exam:   General Appearance: alert, appears stated age and cooperative   Lungs: shallow   Heart: regular rhythm & normal rate, normal S1, S2, no murmur, no gallop, no rub, and no click   Skin: nl Incision c/d/i     Results     Results from last 7 days   Lab Units 08/12/24  0407   WBC 10*3/mm3 8.39   HEMOGLOBIN g/dL 9.6*   HEMATOCRIT % 29.8*   PLATELETS 10*3/mm3 113*     Results from last 7 days   Lab Units 08/12/24  0546   SODIUM mmol/L 139   POTASSIUM mmol/L 5.0   CHLORIDE mmol/L 108*   CO2 mmol/L 23.0   BUN mg/dL 38*   CREATININE mg/dL 1.09   GLUCOSE mg/dL 125*   CALCIUM mg/dL 8.6           Imaging Results (Last 24 Hours)       ** No results found for the last 24 hours. **            Assessment      NSTEMI (non-ST elevated myocardial infarction)    I25.10, CABG + AVR 08/09/24    Ischemic cardiomyopathy    Severe aortic stenosis    Bilateral carotid artery stenosis (>70%)    Former smoker    Dyslipidemia        Plan   Keep left Naeem on suction for small left pleural effusion  May transfer today to telemetry    Gage Gibson MD  08/13/24  07:44 EDT

## 2024-08-14 ENCOUNTER — APPOINTMENT (OUTPATIENT)
Dept: GENERAL RADIOLOGY | Facility: HOSPITAL | Age: 69
End: 2024-08-14
Payer: COMMERCIAL

## 2024-08-14 LAB
ANION GAP SERPL CALCULATED.3IONS-SCNC: 12 MMOL/L (ref 5–15)
BASOPHILS # BLD AUTO: 0.04 10*3/MM3 (ref 0–0.2)
BASOPHILS NFR BLD AUTO: 0.4 % (ref 0–1.5)
BUN SERPL-MCNC: 41 MG/DL (ref 8–23)
BUN/CREAT SERPL: 44.1 (ref 7–25)
CALCIUM SPEC-SCNC: 8.8 MG/DL (ref 8.6–10.5)
CHLORIDE SERPL-SCNC: 103 MMOL/L (ref 98–107)
CO2 SERPL-SCNC: 24 MMOL/L (ref 22–29)
CREAT SERPL-MCNC: 0.93 MG/DL (ref 0.76–1.27)
CYTO UR: NORMAL
DEPRECATED RDW RBC AUTO: 48.2 FL (ref 37–54)
EGFRCR SERPLBLD CKD-EPI 2021: 90.6 ML/MIN/1.73
EOSINOPHIL # BLD AUTO: 0.27 10*3/MM3 (ref 0–0.4)
EOSINOPHIL NFR BLD AUTO: 2.8 % (ref 0.3–6.2)
ERYTHROCYTE [DISTWIDTH] IN BLOOD BY AUTOMATED COUNT: 14.3 % (ref 12.3–15.4)
GLUCOSE BLDC GLUCOMTR-MCNC: 102 MG/DL (ref 70–130)
GLUCOSE BLDC GLUCOMTR-MCNC: 114 MG/DL (ref 70–130)
GLUCOSE BLDC GLUCOMTR-MCNC: 117 MG/DL (ref 70–130)
GLUCOSE BLDC GLUCOMTR-MCNC: 123 MG/DL (ref 70–130)
GLUCOSE SERPL-MCNC: 98 MG/DL (ref 65–99)
HCT VFR BLD AUTO: 35.8 % (ref 37.5–51)
HGB BLD-MCNC: 11.5 G/DL (ref 13–17.7)
IMM GRANULOCYTES # BLD AUTO: 0.11 10*3/MM3 (ref 0–0.05)
IMM GRANULOCYTES NFR BLD AUTO: 1.1 % (ref 0–0.5)
LAB AP CASE REPORT: NORMAL
LAB AP CLINICAL INFORMATION: NORMAL
LYMPHOCYTES # BLD AUTO: 1.46 10*3/MM3 (ref 0.7–3.1)
LYMPHOCYTES NFR BLD AUTO: 15.2 % (ref 19.6–45.3)
MCH RBC QN AUTO: 29.6 PG (ref 26.6–33)
MCHC RBC AUTO-ENTMCNC: 32.1 G/DL (ref 31.5–35.7)
MCV RBC AUTO: 92.3 FL (ref 79–97)
MONOCYTES # BLD AUTO: 1.08 10*3/MM3 (ref 0.1–0.9)
MONOCYTES NFR BLD AUTO: 11.3 % (ref 5–12)
NEUTROPHILS NFR BLD AUTO: 6.62 10*3/MM3 (ref 1.7–7)
NEUTROPHILS NFR BLD AUTO: 69.2 % (ref 42.7–76)
NRBC BLD AUTO-RTO: 0 /100 WBC (ref 0–0.2)
PATH REPORT.FINAL DX SPEC: NORMAL
PATH REPORT.GROSS SPEC: NORMAL
PLATELET # BLD AUTO: 194 10*3/MM3 (ref 140–450)
PMV BLD AUTO: 11.6 FL (ref 6–12)
POTASSIUM SERPL-SCNC: 4.2 MMOL/L (ref 3.5–5.2)
RBC # BLD AUTO: 3.88 10*6/MM3 (ref 4.14–5.8)
SODIUM SERPL-SCNC: 139 MMOL/L (ref 136–145)
WBC NRBC COR # BLD AUTO: 9.58 10*3/MM3 (ref 3.4–10.8)

## 2024-08-14 PROCEDURE — 94799 UNLISTED PULMONARY SVC/PX: CPT

## 2024-08-14 PROCEDURE — 80048 BASIC METABOLIC PNL TOTAL CA: CPT | Performed by: INTERNAL MEDICINE

## 2024-08-14 PROCEDURE — 71045 X-RAY EXAM CHEST 1 VIEW: CPT

## 2024-08-14 PROCEDURE — 97530 THERAPEUTIC ACTIVITIES: CPT

## 2024-08-14 PROCEDURE — 82948 REAGENT STRIP/BLOOD GLUCOSE: CPT

## 2024-08-14 PROCEDURE — 25010000002 ONDANSETRON PER 1 MG: Performed by: PHYSICIAN ASSISTANT

## 2024-08-14 PROCEDURE — 85025 COMPLETE CBC W/AUTO DIFF WBC: CPT | Performed by: INTERNAL MEDICINE

## 2024-08-14 RX ADMIN — CARVEDILOL 6.25 MG: 6.25 TABLET, FILM COATED ORAL at 08:00

## 2024-08-14 RX ADMIN — COLCHICINE 0.6 MG: 0.6 TABLET ORAL at 08:01

## 2024-08-14 RX ADMIN — IPRATROPIUM BROMIDE AND ALBUTEROL SULFATE 3 ML: 2.5; .5 SOLUTION RESPIRATORY (INHALATION) at 13:27

## 2024-08-14 RX ADMIN — ROSUVASTATIN CALCIUM 20 MG: 20 TABLET, COATED ORAL at 21:51

## 2024-08-14 RX ADMIN — OXYCODONE HYDROCHLORIDE 5 MG: 10 TABLET ORAL at 04:29

## 2024-08-14 RX ADMIN — Medication 10 ML: at 21:51

## 2024-08-14 RX ADMIN — CARVEDILOL 6.25 MG: 6.25 TABLET, FILM COATED ORAL at 17:37

## 2024-08-14 RX ADMIN — OXYCODONE HYDROCHLORIDE 5 MG: 10 TABLET ORAL at 17:37

## 2024-08-14 RX ADMIN — Medication 10 ML: at 08:03

## 2024-08-14 RX ADMIN — VALSARTAN 80 MG: 80 TABLET, FILM COATED ORAL at 08:00

## 2024-08-14 RX ADMIN — ONDANSETRON 4 MG: 2 INJECTION INTRAMUSCULAR; INTRAVENOUS at 17:38

## 2024-08-14 RX ADMIN — ASPIRIN 81 MG: 81 TABLET, COATED ORAL at 08:01

## 2024-08-14 NOTE — PROGRESS NOTES
CTS Progress Note      POD 5 s/p AVR CABG X 3       LOS: 7 days   Patient Care Team:  Edgard Romero MD as PCP - General (Family Medicine)    Subjective  No acute events overnight.  Admits to mild incisional pain otherwise without complaints.  Denies dyspnea.    Objective    Vital Signs  Temp:  [98 °F (36.7 °C)-98.8 °F (37.1 °C)] 98.8 °F (37.1 °C)  Heart Rate:  [72-94] 89  Resp:  [20-26] 22  BP: (102-159)/(41-89) 126/89    Physical Exam:   General Appearance: Well-developed, well-nourished in no acute distress   Lungs: Respirations even and unlabored with mild expiratory wheezes   Heart: Regular rate rhythm no murmurs, rubs or gallops.  Sternum stable.   Skin: Sternotomy and EVH incisions are clean, dry and intact   CT Output: 70 mL / 24 hours.  No airleak.  Results     Results from last 7 days   Lab Units 08/14/24  0425   WBC 10*3/mm3 9.58   HEMOGLOBIN g/dL 11.5*   HEMATOCRIT % 35.8*   PLATELETS 10*3/mm3 194     Results from last 7 days   Lab Units 08/14/24  0425   SODIUM mmol/L 139   POTASSIUM mmol/L 4.2   CHLORIDE mmol/L 103   CO2 mmol/L 24.0   BUN mg/dL 41*   CREATININE mg/dL 0.93   GLUCOSE mg/dL 98   CALCIUM mg/dL 8.8           Imaging Results (Last 24 Hours)       Procedure Component Value Units Date/Time    XR Chest 1 View [[id number]] Collected: 08/14/24 0401     Updated: 08/14/24 0405    Narrative:      XR CHEST 1 VW    Date of Exam: 8/14/2024 3:55 AM EDT    Indication: s/p cabg    Comparison: Chest x-ray dated August 12, 2024    Findings:  There are postoperative changes from midline sternotomy from coronary artery bypass grafting. The pulmonary vascular markings are normal. There is been interval removal of the right internal jugular Denver-Binta sheath and catheter. There is a left-sided   thoracostomy tube in place with a small left apical pneumothorax of about 9 mm. There is mild left basilar atelectasis with a small left effusion.      Impression:      Impression:  1.Interval removal of Denver-Binta  catheter.  2.Left thoracostomy tube in place with a small left apical pneumothorax.  3.Left basilar atelectasis with a small left effusion.        Electronically Signed: Sam Mckee MD    8/14/2024 4:02 AM EDT    Workstation ID: [id number]            Assessment      I25.10, CABG + AVR 08/09/24    NSTEMI (non-ST elevated myocardial infarction)    Ischemic cardiomyopathy    Severe aortic stenosis    Bilateral carotid artery stenosis (>70%)    Former smoker    Dyslipidemia    Small left apical pneumothorax    Plan   DC remaining left pleural tube and temporary epicardial pacing wires  Aggressive pulmonary toilet  Mobilize  PT/OT  Chest x-ray in a.m.  Transfer to telemetry    SABRINA Briceno  08/14/24  07:28 EDT

## 2024-08-14 NOTE — PLAN OF CARE
Problem: Adult Inpatient Plan of Care  Goal: Plan of Care Review  Outcome: Ongoing, Progressing  Flowsheets (Taken 8/14/2024 1848)  Progress: improving  Plan of Care Reviewed With:   patient   spouse  Goal: Patient-Specific Goal (Individualized)  Outcome: Ongoing, Progressing  Goal: Absence of Hospital-Acquired Illness or Injury  Outcome: Ongoing, Progressing  Intervention: Identify and Manage Fall Risk  Recent Flowsheet Documentation  Taken 8/14/2024 1800 by Emily De La Fuente RN  Safety Promotion/Fall Prevention:   safety round/check completed   toileting scheduled   fall prevention program maintained  Taken 8/14/2024 1600 by Emily De La Fuente RN  Safety Promotion/Fall Prevention:   safety round/check completed   toileting scheduled   nonskid shoes/slippers when out of bed   fall prevention program maintained   activity supervised   assistive device/personal items within reach  Taken 8/14/2024 1400 by Emily De La Fuente RN  Safety Promotion/Fall Prevention:   safety round/check completed   toileting scheduled   room organization consistent   nonskid shoes/slippers when out of bed   fall prevention program maintained   activity supervised   assistive device/personal items within reach   elopement precautions  Taken 8/14/2024 1200 by Emily De La Fuente RN  Safety Promotion/Fall Prevention:   safety round/check completed   toileting scheduled   fall prevention program maintained  Taken 8/14/2024 1000 by Emily De La Fuente RN  Safety Promotion/Fall Prevention:   safety round/check completed   toileting scheduled   room organization consistent   fall prevention program maintained   activity supervised   assistive device/personal items within reach   clutter free environment maintained  Intervention: Prevent Skin Injury  Recent Flowsheet Documentation  Taken 8/14/2024 1800 by Emily De La Fuente RN  Body Position: position changed independently  Taken 8/14/2024 1600 by Emily De La Fuente RN  Body Position:   neutral head position   position changed  independently  Taken 8/14/2024 1400 by Emily De La Fuente RN  Body Position: position changed independently  Taken 8/14/2024 1200 by Emily De La Fuente RN  Body Position: position changed independently  Taken 8/14/2024 1000 by Emily De La Fuente RN  Body Position: position changed independently  Skin Protection:   adhesive use limited   incontinence pads utilized   protective footwear used   pulse oximeter probe site changed   skin sealant/moisture barrier applied   skin-to-device areas padded   transparent dressing maintained   tubing/devices free from skin contact  Intervention: Prevent and Manage VTE (Venous Thromboembolism) Risk  Recent Flowsheet Documentation  Taken 8/14/2024 1200 by Emily De La Fuente RN  Activity Management: activity encouraged  Taken 8/14/2024 1000 by Emily De La Fuente RN  Activity Management: activity encouraged  Intervention: Prevent Infection  Recent Flowsheet Documentation  Taken 8/14/2024 1400 by Emily De La Fuente RN  Infection Prevention:   environmental surveillance performed   hand hygiene promoted   personal protective equipment utilized   rest/sleep promoted   single patient room provided  Taken 8/14/2024 1200 by Emily De La Fuente RN  Infection Prevention:   environmental surveillance performed   hand hygiene promoted   personal protective equipment utilized   rest/sleep promoted   single patient room provided  Taken 8/14/2024 1000 by Emily De La Fuente RN  Infection Prevention:   environmental surveillance performed   hand hygiene promoted   personal protective equipment utilized   rest/sleep promoted   single patient room provided  Goal: Optimal Comfort and Wellbeing  Outcome: Ongoing, Progressing  Goal: Readiness for Transition of Care  Outcome: Ongoing, Progressing     Problem: COPD (Chronic Obstructive Pulmonary Disease) Comorbidity  Goal: Maintenance of COPD Symptom Control  Outcome: Ongoing, Progressing     Problem: Heart Failure Comorbidity  Goal: Maintenance of Heart Failure Symptom Control  Outcome: Ongoing, Progressing      Problem: Pain Chronic (Persistent) (Comorbidity Management)  Goal: Acceptable Pain Control and Functional Ability  Outcome: Ongoing, Progressing  Intervention: Optimize Psychosocial Wellbeing  Recent Flowsheet Documentation  Taken 8/14/2024 1800 by Emily De La Fuente RN  Diversional Activities: television  Taken 8/14/2024 1600 by Emily De La Fuente RN  Diversional Activities: television  Taken 8/14/2024 1400 by Emily De La Fuente RN  Diversional Activities: television  Taken 8/14/2024 1200 by Emily De La Fuente RN  Diversional Activities: television  Taken 8/14/2024 1000 by Emily De La Fuente RN  Diversional Activities: television     Problem: Fall Injury Risk  Goal: Absence of Fall and Fall-Related Injury  Outcome: Ongoing, Progressing  Intervention: Promote Injury-Free Environment  Recent Flowsheet Documentation  Taken 8/14/2024 1800 by Emily De La Fuente RN  Safety Promotion/Fall Prevention:   safety round/check completed   toileting scheduled   fall prevention program maintained  Taken 8/14/2024 1600 by Emily De La Fuente RN  Safety Promotion/Fall Prevention:   safety round/check completed   toileting scheduled   nonskid shoes/slippers when out of bed   fall prevention program maintained   activity supervised   assistive device/personal items within reach  Taken 8/14/2024 1400 by Emily De La Fuente RN  Safety Promotion/Fall Prevention:   safety round/check completed   toileting scheduled   room organization consistent   nonskid shoes/slippers when out of bed   fall prevention program maintained   activity supervised   assistive device/personal items within reach   elopement precautions  Taken 8/14/2024 1200 by Emily De La Fuente RN  Safety Promotion/Fall Prevention:   safety round/check completed   toileting scheduled   fall prevention program maintained  Taken 8/14/2024 1000 by Emily De La Fuente RN  Safety Promotion/Fall Prevention:   safety round/check completed   toileting scheduled   room organization consistent   fall prevention program maintained   activity supervised    assistive device/personal items within reach   clutter free environment maintained     Problem: Adjustment to Illness (Heart Failure)  Goal: Optimal Coping  Outcome: Ongoing, Progressing     Problem: Cardiac Output Decreased (Heart Failure)  Goal: Optimal Cardiac Output  Outcome: Ongoing, Progressing     Problem: Dysrhythmia (Heart Failure)  Goal: Stable Heart Rate and Rhythm  Outcome: Ongoing, Progressing     Problem: Fluid Imbalance (Heart Failure)  Goal: Fluid Balance  Outcome: Ongoing, Progressing     Problem: Functional Ability Impaired (Heart Failure)  Goal: Optimal Functional Ability  Outcome: Ongoing, Progressing  Intervention: Optimize Functional Ability  Recent Flowsheet Documentation  Taken 8/14/2024 1200 by Emily De La Fuente RN  Activity Management: activity encouraged  Taken 8/14/2024 1000 by Emily De La Fuente RN  Activity Management: activity encouraged     Problem: Oral Intake Inadequate (Heart Failure)  Goal: Optimal Nutrition Intake  Outcome: Ongoing, Progressing     Problem: Respiratory Compromise (Heart Failure)  Goal: Effective Oxygenation and Ventilation  Outcome: Ongoing, Progressing  Intervention: Promote Airway Secretion Clearance  Recent Flowsheet Documentation  Taken 8/14/2024 1000 by Emily De La Fuente RN  Cough And Deep Breathing: done independently per patient  Intervention: Optimize Oxygenation and Ventilation  Recent Flowsheet Documentation  Taken 8/14/2024 1800 by Emily De La Fuente RN  Airway/Ventilation Management: pulmonary hygiene promoted  Taken 8/14/2024 1600 by Emily De La Fuente RN  Airway/Ventilation Management: pulmonary hygiene promoted  Taken 8/14/2024 1000 by Emily De La Fuente RN  Airway/Ventilation Management: pulmonary hygiene promoted     Problem: Sleep Disordered Breathing (Heart Failure)  Goal: Effective Breathing Pattern During Sleep  Outcome: Ongoing, Progressing     Problem: Skin Injury Risk Increased  Goal: Skin Health and Integrity  Outcome: Ongoing, Progressing  Intervention: Optimize Skin  Protection  Recent Flowsheet Documentation  Taken 8/14/2024 1000 by Emily De La Fuente RN  Pressure Reduction Techniques:   frequent weight shift encouraged   heels elevated off bed   positioned off wounds   pressure points protected   weight shift assistance provided  Pressure Reduction Devices:   chair cushion utilized   pressure-redistributing mattress utilized   heel offloading device utilized   positioning supports utilized  Skin Protection:   adhesive use limited   incontinence pads utilized   protective footwear used   pulse oximeter probe site changed   skin sealant/moisture barrier applied   skin-to-device areas padded   transparent dressing maintained   tubing/devices free from skin contact     Problem: Communication Impairment (Mechanical Ventilation, Invasive)  Goal: Effective Communication  Outcome: Ongoing, Progressing     Problem: Device-Related Complication Risk (Mechanical Ventilation, Invasive)  Goal: Optimal Device Function  Outcome: Ongoing, Progressing     Problem: Inability to Wean (Mechanical Ventilation, Invasive)  Goal: Mechanical Ventilation Liberation  Outcome: Ongoing, Progressing     Problem: Nutrition Impairment (Mechanical Ventilation, Invasive)  Goal: Optimal Nutrition Delivery  Outcome: Ongoing, Progressing     Problem: Skin and Tissue Injury (Mechanical Ventilation, Invasive)  Goal: Absence of Device-Related Skin and Tissue Injury  Outcome: Ongoing, Progressing  Intervention: Maintain Skin and Tissue Health  Recent Flowsheet Documentation  Taken 8/14/2024 1000 by Emily De La Fuente RN  Device Skin Pressure Protection:   absorbent pad utilized/changed   adhesive use limited   positioning supports utilized   pressure points protected   skin-to-device areas padded     Problem: Ventilator-Induced Lung Injury (Mechanical Ventilation, Invasive)  Goal: Absence of Ventilator-Induced Lung Injury  Outcome: Ongoing, Progressing     Problem: Activity Intolerance (Cardiovascular Surgery)  Goal: Improved  Activity Tolerance  Outcome: Ongoing, Progressing     Problem: Adjustment to Surgery (Cardiovascular Surgery)  Goal: Optimal Coping with Heart Surgery  Outcome: Ongoing, Progressing     Problem: Bleeding (Cardiovascular Surgery)  Goal: Bleeding (Cardiovascular Surgery)  Outcome: Ongoing, Progressing     Problem: Bowel Motility Impaired (Cardiovascular Surgery)  Goal: Effective Bowel Elimination (Cardiovascular Surgery)  Outcome: Ongoing, Progressing  Intervention: Enhance Bowel Motility and Elimination  Recent Flowsheet Documentation  Taken 8/14/2024 1000 by Emily De La Fuente RN  Bowel Motility Enhancement:   ambulation promoted   fluid intake encouraged   oral intake encouraged     Problem: Cardiac Function Impaired (Cardiovascular Surgery)  Goal: Effective Cardiac Function  Outcome: Ongoing, Progressing     Problem: Cerebral Tissue Perfusion (Cardiovascular Surgery)  Goal: Optimal Cerebral Tissue Perfusion (Cardiovascular Surgery)  Outcome: Ongoing, Progressing  Intervention: Protect and Optimize Cerebral Perfusion  Recent Flowsheet Documentation  Taken 8/14/2024 1800 by Emily De La Fuente RN  Cerebral Perfusion Promotion: blood pressure monitored  Taken 8/14/2024 1000 by Emily De La Fuente RN  Glycemic Management: blood glucose monitored  Sensory Stimulation Regulation:   care clustered   television on  Cerebral Perfusion Promotion: blood pressure monitored     Problem: Fluid and Electrolyte Imbalance (Cardiovascular Surgery)  Goal: Fluid and Electrolyte Balance (Cardiovascular Surgery)  Outcome: Ongoing, Progressing     Problem: Glycemic Control Impaired (Cardiovascular Surgery)  Goal: Blood Glucose Level Within Targeted Range (Cardiovascular Surgery)  Outcome: Ongoing, Progressing  Intervention: Optimize Glycemic Control  Recent Flowsheet Documentation  Taken 8/14/2024 1000 by Emily De La Fuente RN  Glycemic Management: blood glucose monitored     Problem: Infection (Cardiovascular Surgery)  Goal: Absence of Infection Signs and  Symptoms  Outcome: Ongoing, Progressing  Intervention: Prevent or Manage Infection  Recent Flowsheet Documentation  Taken 8/14/2024 1400 by Emily De La Fuente RN  Infection Prevention:   environmental surveillance performed   hand hygiene promoted   personal protective equipment utilized   rest/sleep promoted   single patient room provided  Taken 8/14/2024 1200 by Emily De La Fuente RN  Infection Prevention:   environmental surveillance performed   hand hygiene promoted   personal protective equipment utilized   rest/sleep promoted   single patient room provided  Taken 8/14/2024 1000 by Emily De La Fuente RN  Infection Prevention:   environmental surveillance performed   hand hygiene promoted   personal protective equipment utilized   rest/sleep promoted   single patient room provided     Problem: Ongoing Anesthesia Effects (Cardiovascular Surgery)  Goal: Anesthesia/Sedation Recovery  Outcome: Ongoing, Progressing  Intervention: Optimize Anesthesia Recovery  Recent Flowsheet Documentation  Taken 8/14/2024 1800 by Emily De La Fuente RN  Safety Promotion/Fall Prevention:   safety round/check completed   toileting scheduled   fall prevention program maintained  Taken 8/14/2024 1600 by Emily De La Fuente RN  Safety Promotion/Fall Prevention:   safety round/check completed   toileting scheduled   nonskid shoes/slippers when out of bed   fall prevention program maintained   activity supervised   assistive device/personal items within reach  Taken 8/14/2024 1400 by Emily De La Fuente RN  Safety Promotion/Fall Prevention:   safety round/check completed   toileting scheduled   room organization consistent   nonskid shoes/slippers when out of bed   fall prevention program maintained   activity supervised   assistive device/personal items within reach   elopement precautions  Taken 8/14/2024 1200 by Emily De La Fuente RN  Safety Promotion/Fall Prevention:   safety round/check completed   toileting scheduled   fall prevention program maintained  Taken 8/14/2024 1000 by Sudhakar  RAUDEL Diaz  Safety Promotion/Fall Prevention:   safety round/check completed   toileting scheduled   room organization consistent   fall prevention program maintained   activity supervised   assistive device/personal items within reach   clutter free environment maintained     Problem: Pain (Cardiovascular Surgery)  Goal: Acceptable Pain Control  Outcome: Ongoing, Progressing  Intervention: Prevent or Manage Pain  Recent Flowsheet Documentation  Taken 8/14/2024 1800 by Emily De La Fuente RN  Diversional Activities: television  Taken 8/14/2024 1600 by Emily De La Fuente RN  Diversional Activities: television  Taken 8/14/2024 1400 by Emily De La Fuente RN  Diversional Activities: television  Taken 8/14/2024 1200 by Emily De La Fuente RN  Diversional Activities: television  Taken 8/14/2024 1000 by Emily De La Fuente RN  Diversional Activities: television     Problem: Postoperative Nausea and Vomiting (Cardiovascular Surgery)  Goal: Nausea and Vomiting Relief (Cardiovascular Surgery)  Outcome: Ongoing, Progressing     Problem: Postoperative Urinary Retention (Cardiovascular Surgery)  Goal: Effective Urinary Elimination (Cardiovascular Surgery)  Outcome: Ongoing, Progressing     Problem: Respiratory Compromise (Cardiovascular Surgery)  Goal: Effective Oxygenation and Ventilation (Cardiovascular Surgery)  Outcome: Ongoing, Progressing  Intervention: Promote Airway Secretion Clearance  Recent Flowsheet Documentation  Taken 8/14/2024 1800 by Emily De La Fuente RN  Airway/Ventilation Management: pulmonary hygiene promoted  Taken 8/14/2024 1600 by Emily De La Fuente RN  Airway/Ventilation Management: pulmonary hygiene promoted  Taken 8/14/2024 1000 by Emily De La Fuente RN  Airway/Ventilation Management: pulmonary hygiene promoted  Cough And Deep Breathing: done independently per patient   Goal Outcome Evaluation:  Plan of Care Reviewed With: patient, spouse        Progress: improving

## 2024-08-14 NOTE — THERAPY TREATMENT NOTE
Patient Name: Preet Edwards  : 1955    MRN: [id number]                              Today's Date: 2024       Admit Date: 2024    Visit Dx:     ICD-10-CM ICD-9-CM   1. NSTEMI (non-ST elevated myocardial infarction)  I21.4 410.70   2. Coronary artery disease involving native heart, unspecified vessel or lesion type, unspecified whether angina present  I25.10 414.01     Patient Active Problem List   Diagnosis    NSTEMI (non-ST elevated myocardial infarction)    I25.10, CABG + AVR 24    Ischemic cardiomyopathy    Severe aortic stenosis    Bilateral carotid artery stenosis (>70%)    Former smoker    Dyslipidemia     Past Medical History:   Diagnosis Date    COPD (chronic obstructive pulmonary disease)     GERD (gastroesophageal reflux disease)      Past Surgical History:   Procedure Laterality Date    CARDIAC CATHETERIZATION N/A 2024    Procedure: Left Heart Cath;  Surgeon: Logan Calix MD;  Location: [facility] CATH INVASIVE LOCATION;  Service: Cardiovascular;  Laterality: N/A;    CATARACT EXTRACTION      CORONARY ARTERY BYPASS GRAFT WITH AORTIC VALVE REPAIR/REPLACEMENT N/A 2024    Procedure: CORONARY ARTERY BYPASS X 3 WITH INTERNAL MAMMARY ARTERY GRAFT AND AORTIC VALVE REPAIR/REPLACEMENT, SINDY, EVH;  Surgeon: Gage Gibson MD;  Location: [facility] OR;  Service: Cardiothoracic;  Laterality: N/A;      General Information       Row Name 24 140          Physical Therapy Time and Intention    Document Type therapy note (daily note)  -KG     Mode of Treatment physical therapy  -KG       Row Name 24 140          General Information    Existing Precautions/Restrictions cardiac;fall;sternal  -KG       Row Name 24 140          Cognition    Orientation Status (Cognition) oriented x 3  -KG       Row Name 24 140          Safety Issues, Functional Mobility    Safety Issues Affecting Function (Mobility) awareness of need for assistance;insight into  deficits/self-awareness;safety precaution awareness;safety precautions follow-through/compliance  -KG     Impairments Affecting Function (Mobility) balance;coordination;endurance/activity tolerance;postural/trunk control;strength  -KG               User Key  (r) = Recorded By, (t) = Taken By, (c) = Cosigned By      Initials Name Provider Type    KG Margarita Street PT Physical Therapist                   Mobility       Row Name 08/14/24 1406          Bed Mobility    Comment, (Bed Mobility) UIC  -KG       Row Name 08/14/24 1406          Transfers    Comment, (Transfers) VC's for sequencing and safe hand placement to maintain sternal precautions.  -KG       Row Name 08/14/24 1406          Sit-Stand Transfer    Sit-Stand [place] (Transfers) minimum assist (75% patient effort);verbal cues  -KG       Row Name 08/14/24 1406          Gait/Stairs (Locomotion)    [place] Level (Gait) minimum assist (75% patient effort);verbal cues  -KG     Assistive Device (Gait) other (see comments)  B UE support on tripod monitor  -KG     Distance in Feet (Gait) 220  -KG     Deviations/Abnormal Patterns (Gait) rancho decreased;stride length decreased  -KG     Bilateral Gait Deviations forward flexed posture  -KG     Comment, (Gait/Stairs) Pt demonstrated step through gait pattern with slow rancho and decreased step length. VC's for upright posture. Pt with improved balance and coordination. Required one brief standing rest break. Ambulation distance limited by fatigue.  -KG               User Key  (r) = Recorded By, (t) = Taken By, (c) = Cosigned By      Initials Name Provider Type    Margarita Woods PT Physical Therapist                   Obj/Interventions       Row Name 08/14/24 1407          Balance    Balance Assessment sitting static balance;standing static balance;standing dynamic balance  -KG     Static Sitting Balance independent  -KG     Position, Sitting Balance unsupported;sitting in chair  -KG      Static Standing Balance contact guard  -KG     Dynamic Standing Balance minimal assist  -KG     Position/Device Used, Standing Balance supported  -KG               User Key  (r) = Recorded By, (t) = Taken By, (c) = Cosigned By      Initials Name Provider Type    KG Margarita Street N, PT Physical Therapist                   Goals/Plan    No documentation.                  Clinical Impression       Row Name 08/14/24 1408          Pain    Pretreatment Pain Rating 0/10 - no pain  -KG     Posttreatment Pain Rating 0/10 - no pain  -KG       Row Name 08/14/24 1408          Plan of Care Review    Plan of Care Reviewed With patient  -KG     Progress improving  -KG     Outcome Evaluation Pt ambulated 220ft with Kevin and B UE support on tripod monitor. VC's for upright posture and increased stride length. Pt demonstrated good balance and stability. One brief standing rest break. Continue to recommend PT skilled care and D/C home with assistance and  PT services.  -KG       [facility] Name 08/14/24 1408          Vital Signs    Pre Systolic BP Rehab 118  -KG     Pre Treatment Diastolic BP 59  -KG     Post Systolic BP Rehab 118  -KG     Post Treatment Diastolic BP 64  -KG     Pretreatment Heart Rate (beats/min) 81  -KG     Posttreatment Heart Rate (beats/min) 80  -KG     Pre SpO2 (%) 95  -KG     O2 Delivery Pre Treatment room air  -KG     Post SpO2 (%) 94  -KG     O2 Delivery Post Treatment room air  -KG     Pre Patient Position Sitting  -KG     Intra Patient Position Standing  -KG     Post Patient Position Sitting  -KG       Row Name 08/14/24 1408          Positioning and Restraints    Pre-Treatment Position sitting in chair/recliner  -KG     Post Treatment Position chair  -KG     In Chair notified nsg;reclined;call light within reach;encouraged to call for assist;RUE elevated;LUE elevated;legs elevated  -KG               User Key  (r) = Recorded By, (t) = Taken By, (c) = Cosigned By      Initials Name Provider Type    KG  Margarita Street, PT Physical Therapist                   Outcome Measures       Row Name 08/14/24 1409          How much help from another person do you currently need...    Turning from your back to your side while in flat bed without using bedrails? 3  -KG     Moving from lying on back to sitting on the side of a flat bed without bedrails? 2  -KG     Moving to and from a bed to a chair (including a wheelchair)? 3  -KG     Standing up from a chair using your arms (e.g., wheelchair, bedside chair)? 3  -KG     Climbing 3-5 steps with a railing? 3  -KG     To walk in hospital room? 3  -KG     AM-PAC 6 Clicks Score (PT) 17  -KG     Highest Level of Mobility Goal 5 --> Static standing  -KG       Row Name 08/14/24 1409          Functional Assessment    Outcome Measure Options AM-PAC 6 Clicks Basic Mobility (PT)  -KG               User Key  (r) = Recorded By, (t) = Taken By, (c) = Cosigned By      Initials Name Provider Type    KG Margarita Street, PT Physical Therapist                                 Physical Therapy Education       Title: PT OT SLP Therapies (In Progress)       Topic: Physical Therapy (In Progress)       Point: Mobility training (In Progress)       Learning Progress Summary             Patient Acceptance, E, NR by KG at 8/14/2024 0917    Acceptance, E, NR by KG at 8/13/2024 0923    Acceptance, E, NR by KG at 8/12/2024 0918    Acceptance, E, NR by LAISHA at 8/10/2024 0730                         Point: Home exercise program (In Progress)       Learning Progress Summary             Patient Acceptance, E, NR by KG at 8/14/2024 0917    Acceptance, E, NR by KG at 8/13/2024 0923    Acceptance, E, NR by KG at 8/12/2024 0918    Acceptance, E, NR by LAISHA at 8/10/2024 0730                         Point: Body mechanics (In Progress)       Learning Progress Summary             Patient Acceptance, E, NR by KG at 8/14/2024 0917    Acceptance, E, NR by KG at 8/13/2024 0923    Acceptance, E, NR by KG at 8/12/2024  0918    Acceptance, E, NR by LAISHA at 8/10/2024 0730                         Point: Precautions (In Progress)       Learning Progress Summary             Patient Acceptance, E, NR by KG at 8/14/2024 0917    Acceptance, E, NR by KG at 8/13/2024 0923    Acceptance, E, NR by KG at 8/12/2024 0918    Acceptance, E, NR by LAISHA at 8/10/2024 0730                                         User Key       Initials Effective Dates Name Provider Type Discipline    LAISHA 02/03/23 -  Shannan Briceno, PT Physical Therapist PT    KG 05/22/20 -  Margarita Street PT Physical Therapist PT                  PT Recommendation and Plan     Plan of Care Reviewed With: patient  Progress: improving  Outcome Evaluation: Pt ambulated 220ft with Kevin and B UE support on tripod monitor. VC's for upright posture and increased stride length. Pt demonstrated good balance and stability. One brief standing rest break. Continue to recommend PT skilled care and D/C home with assistance and  PT services.     Time Calculation:         PT Charges       Row Name 08/14/24 0917             Time Calculation    Start Time 0917  -KG      PT Received On 08/14/24  -KG      PT Goal Re-Cert Due Date 08/20/24  -KG         Time Calculation- PT    Total Timed Code Minutes- PT 13 minute(s)  -KG         Timed Charges    60281 - PT Therapeutic Activity Minutes 13  -KG         Total Minutes    Timed Charges Total Minutes 13  -KG       Total Minutes 13  -KG                User Key  (r) = Recorded By, (t) = Taken By, (c) = Cosigned By      Initials Name Provider Type    KG Margarita Street, PT Physical Therapist                  Therapy Charges for Today       Code Description Service Date Service Provider Modifiers Qty    69055047443 HC PT THERAPEUTIC ACT EA 15 MIN 8/13/2024 Margarita Street, PT GP 2    00807007466 HC PT THERAPEUTIC ACT EA 15 MIN 8/14/2024 Margarita Street, PT GP 1            PT G-Codes  Outcome Measure Options: AM-PAC 6 Clicks Basic  Mobility (PT)  AM-PAC 6 Clicks Score (PT): 17  PT Discharge Summary  Anticipated Discharge Disposition (PT): home with assist, home with home health    Jennifer Street, PT  8/14/2024

## 2024-08-14 NOTE — CASE MANAGEMENT/SOCIAL WORK
Continued Stay Note  [facility]     Patient Name: Preet Edwards  MRN: [id number]  Today's Date: 8/14/2024    Admit Date: 8/7/2024    Plan: Home    Discharge Plan       Row Name 08/14/24 1205       Plan    Plan Home     Patient/Family in Agreement with Plan yes    Plan Comments Spoke with patient at bedside, denies any discharge needs at this time. Recieved a call from  that pt insurance is not in effect, called spouse and she is going to stop by registrar office to make sure they have correct insurance cards and will refax when corrected in system. CM will cont to follow.    Final Discharge Disposition Code 06 - home with home health care                   Discharge Codes    No documentation.                       Riri Gautam RN

## 2024-08-14 NOTE — PLAN OF CARE
Goal Outcome Evaluation:      Alert and oriented x4. Up in chair this am. Chest tube remains in place with 40 ml of output. No SOA. Remains in Normal sinus rhythm. Pain controlled with Norco and Zhane. O2 at 2 liters during sleep. Vital signs stable. Ambulated in aly 220 ml with assist of 1. Diarrhea continues . Patient refuses bowel regimen. Blood sugars within range . Awaiting a tele floor bed.

## 2024-08-14 NOTE — PROGRESS NOTES
INTENSIVIST   PROGRESS NOTE     Hospital:  LOS: 7 days     Mr. Preet Edwards, 68 y.o. male is followed for a Chief Complaint of: Glycemic, Electrolyte, Respiratory, and Medical management        Subjective   S     Interval History:  POD: 5 Days Post-Op CABG 08/09/24    Uneventful night.    On ambient air.    Chest tube pulled out this morning.    Temp  Min: 98 °F (36.7 °C)  Max: 98.8 °F (37.1 °C)      The patient's relevant past medical, surgical and social history were reviewed and updated in Epic as appropriate.        History     Last Reviewed by Luis E Weinstein MD on 8/12/2024 at  8:49 AM    Sections Reviewed    Medical, Surgical, Family, Tobacco, Alcohol, Drug Use, Sexual Activity,   Social Documentation    Problem list reviewed by Luis E Weinstein MD on 8/12/2024 at  8:48 AM  Medicines reviewed by Luis E Weinstein MD on 8/12/2024 at  8:48 AM  Allergies reviewed by Luis E Weinstein MD on 8/12/2024 at  8:48 AM     Objective   O     Vitals:  Temp  Min: 98 °F (36.7 °C)  Max: 98.8 °F (37.1 °C)  BP  Min: 102/53  Max: 159/73  Pulse  Min: 72  Max: 94  Resp  Min: 18  Max: 26  SpO2  Min: 90 %  Max: 98 % Flow (L/min)  Min: 2  Max: 98      Physical Examination    Physical Examination  Telemetry:  Rhythm: normal sinus rhythm (08/14/24 0600)      Constitutional:  No acute distress.   Cardiovascular: RRR.    Respiratory: Normal breath sounds  No adventitious sounds   Abdominal:  Soft with no tenderness.   Extremities: No Edema   Neurological:   Alert, Oriented, Cooperative.  Best Eye Response: 4-->(E4) spontaneous (08/14/24 0600)  Best Motor Response: 6-->(M6) obeys commands (08/14/24 0600)  Best Verbal Response: 5-->(V5) oriented (08/14/24 0600)  Tj Coma Scale Score: 15 (08/14/24 0600)      Results Reviewed:  Laboratory  Microbiology  Radiology  Pathology    Hematology:  Results from last 7 days   Lab Units 08/14/24  0425 08/12/24  0407 08/11/24  1418   WBC 10*3/mm3 9.58 8.39 8.87   HEMOGLOBIN g/dL 11.5* 9.6* 9.4*   MCV fL  92.3 92.5 92.8   PLATELETS 10*3/mm3 194 113* 90*     Results from last 7 days   Lab Units 08/14/24  0425 08/10/24  0504 08/08/24  0325   NEUTROS ABS 10*3/mm3 6.62 9.92* 3.50   LYMPHS ABS 10*3/mm3 1.46 1.03 2.12   EOS ABS 10*3/mm3 0.27 0.01 0.33      Chemistry:  Estimated Creatinine Clearance: 82 mL/min (by C-G formula based on SCr of 0.93 mg/dL).    Results from last 7 days   Lab Units 08/14/24  0425 08/12/24  0546   SODIUM mmol/L 139 139   POTASSIUM mmol/L 4.2 5.0   CHLORIDE mmol/L 103 108*   CO2 mmol/L 24.0 23.0   BUN mg/dL 41* 38*   CREATININE mg/dL 0.93 1.09   GLUCOSE mg/dL 98 125*     Results from last 7 days   Lab Units 08/14/24  0425 08/12/24  0546 08/12/24  0407 08/11/24  0403 08/10/24  0836 08/10/24  0504   CALCIUM mg/dL 8.8 8.6  --    < >  --  7.9*   MAGNESIUM mg/dL  --   --  2.8*  --   --  3.8*   PHOSPHORUS mg/dL  --   --  2.6  --  4.7*  --     < > = values in this interval not displayed.     Lab Results   Component Value Date    TSH 4.230 (H) 08/08/2024    FREET4 1.64 08/08/2024      Images:  XR Chest 1 View    Result Date: 8/14/2024  Impression: 1.Interval removal of Goddard-Binta catheter. 2.Left thoracostomy tube in place with a small left apical pneumothorax. 3.Left basilar atelectasis with a small left effusion. Electronically Signed: Sam Mckee MD  8/14/2024 4:02 AM EDT  Workstation ID: [id number]     Echo:  Results for orders placed during the hospital encounter of 08/07/24    Adult Transthoracic Echo Complete W/ Cont if Necessary Per Protocol    Interpretation Summary  •  Left ventricular systolic function is moderately decreased. Estimated left ventricular EF = 35%  •  Left ventricular wall thickness is consistent with borderline concentric hypertrophy.  •  The following left ventricular wall segments are hypokinetic: mid anterior, apical anterior, apical lateral, apical septal and apex hypokinetic.  •  Severe aortic valve stenosis is present.  •  Aortic valve maximum pressure gradient is 67 mmHg.  Aortic valve mean pressure gradient is 42 mmHg.    Results: Reviewed.  I reviewed the patient's new laboratory and imaging results.  I independently reviewed the patient's new images.    Medications: Reviewed.      Assessment & Plan   A / P     Mr. Edwards is a 69yo M former smoker who came on 8/7/24 as a field STEMI.    His LHC showed multi-vessel disease. (Dr. Calix). Echocardiogram revealed severe aortic stenosis.     He underwent CABG x 3 and AVR with a bovine Tapia valve by Dr. Gibson on 8/9/24.     He was extubated on the morning of 8/10/24.     Lab Results   Lab Value Date/Time    HGBA1C 5.60 08/08/2024 0325     Results from last 7 days   Lab Units 08/14/24  0714 08/13/24 2010 08/13/24  1625 08/13/24  1114 08/13/24  0721 08/12/24 2010 08/12/24  1609 08/12/24  1124   GLUCOSE mg/dL 102 135* 109 115 101 120 121 125         Nutrition:   Diet: Cardiac, Diabetic, Regular/House; Healthy Heart (2-3 Na+); Consistent Carbohydrate; Fluid Consistency: Thin (IDDSI 0)    Advance Directives:   Code Status and Medical Interventions: CPR (Attempt to Resuscitate); Full Support   Ordered at: 08/09/24 1804     Level Of Support Discussed With:    Patient     Code Status (Patient has no pulse and is not breathing):    CPR (Attempt to Resuscitate)     Medical Interventions (Patient has pulse or is breathing):    Full Support       Active Hospital Problems    Diagnosis    • **I25.10, CABG + AVR 08/09/24    • Ischemic cardiomyopathy    • Severe aortic stenosis    • Bilateral carotid artery stenosis (>70%)    • Former smoker    • Dyslipidemia    • NSTEMI (non-ST elevated myocardial infarction)      Assessment / Plan:    Disposition: Telemetry Bed ordered on 08/12/24. Bed available today.      Plan of care and goals reviewed during interdisciplinary rounds.  I discussed the patient's findings and my recommendations with patient, family, and nursing staff

## 2024-08-14 NOTE — PLAN OF CARE
Goal Outcome Evaluation:  Plan of Care Reviewed With: patient        Progress: improving  Outcome Evaluation: Pt ambulated 220ft with Kevin and B UE support on tripod monitor. VC's for upright posture and increased stride length. Pt demonstrated good balance and stability. One brief standing rest break. Continue to recommend PT skilled care and D/C home with assistance and  PT services.      Anticipated Discharge Disposition (PT): home with assist, home with home health

## 2024-08-15 ENCOUNTER — READMISSION MANAGEMENT (OUTPATIENT)
Dept: CALL CENTER | Facility: HOSPITAL | Age: 69
End: 2024-08-15
Payer: COMMERCIAL

## 2024-08-15 ENCOUNTER — APPOINTMENT (OUTPATIENT)
Dept: CARDIOLOGY | Facility: HOSPITAL | Age: 69
End: 2024-08-15
Payer: COMMERCIAL

## 2024-08-15 ENCOUNTER — APPOINTMENT (OUTPATIENT)
Dept: GENERAL RADIOLOGY | Facility: HOSPITAL | Age: 69
End: 2024-08-15
Payer: COMMERCIAL

## 2024-08-15 VITALS
HEART RATE: 81 BPM | WEIGHT: 186.73 LBS | OXYGEN SATURATION: 99 % | RESPIRATION RATE: 16 BRPM | TEMPERATURE: 98 F | HEIGHT: 68 IN | DIASTOLIC BLOOD PRESSURE: 50 MMHG | SYSTOLIC BLOOD PRESSURE: 116 MMHG | BODY MASS INDEX: 28.3 KG/M2

## 2024-08-15 LAB
BH CV ECHO MEAS - EDV(MOD-SP2): 175 ML
BH CV ECHO MEAS - EDV(MOD-SP4): 160 ML
BH CV ECHO MEAS - EF(MOD-BP): 33.5 %
BH CV ECHO MEAS - EF(MOD-SP2): 33.7 %
BH CV ECHO MEAS - EF(MOD-SP4): 34.4 %
BH CV ECHO MEAS - ESV(MOD-SP2): 116 ML
BH CV ECHO MEAS - ESV(MOD-SP4): 105 ML
BH CV ECHO MEAS - LV DIASTOLIC VOL/BSA (35-75): 80.7 CM2
BH CV ECHO MEAS - LV SYSTOLIC VOL/BSA (12-30): 53 CM2
BH CV ECHO MEAS - SV(MOD-SP2): 59 ML
BH CV ECHO MEAS - SV(MOD-SP4): 55 ML
BH CV ECHO MEAS - SVI(MOD-SP2): 29.8 ML/M2
BH CV ECHO MEAS - SVI(MOD-SP4): 27.8 ML/M2
BH CV VAS BP RIGHT ARM: NORMAL MMHG
GLUCOSE BLDC GLUCOMTR-MCNC: 129 MG/DL (ref 70–130)
GLUCOSE BLDC GLUCOMTR-MCNC: 139 MG/DL (ref 70–130)
LV EF 2D ECHO EST: 35 %

## 2024-08-15 PROCEDURE — 25010000002 FUROSEMIDE PER 20 MG: Performed by: NURSE PRACTITIONER

## 2024-08-15 PROCEDURE — 93308 TTE F-UP OR LMTD: CPT

## 2024-08-15 PROCEDURE — 94799 UNLISTED PULMONARY SVC/PX: CPT

## 2024-08-15 PROCEDURE — 25010000002 SULFUR HEXAFLUORIDE MICROSPH 60.7-25 MG RECONSTITUTED SUSPENSION: Performed by: NURSE PRACTITIONER

## 2024-08-15 PROCEDURE — 99232 SBSQ HOSP IP/OBS MODERATE 35: CPT | Performed by: NURSE PRACTITIONER

## 2024-08-15 PROCEDURE — 94761 N-INVAS EAR/PLS OXIMETRY MLT: CPT

## 2024-08-15 PROCEDURE — 99024 POSTOP FOLLOW-UP VISIT: CPT

## 2024-08-15 PROCEDURE — 71045 X-RAY EXAM CHEST 1 VIEW: CPT

## 2024-08-15 PROCEDURE — 93308 TTE F-UP OR LMTD: CPT | Performed by: INTERNAL MEDICINE

## 2024-08-15 PROCEDURE — 82948 REAGENT STRIP/BLOOD GLUCOSE: CPT

## 2024-08-15 RX ORDER — POTASSIUM CHLORIDE 750 MG/1
10 TABLET, EXTENDED RELEASE ORAL DAILY
Qty: 30 TABLET | Refills: 11 | Status: SHIPPED | OUTPATIENT
Start: 2024-08-15

## 2024-08-15 RX ORDER — ASPIRIN 81 MG/1
81 TABLET ORAL DAILY
Qty: 90 TABLET | Refills: 1 | Status: SHIPPED | OUTPATIENT
Start: 2024-08-16

## 2024-08-15 RX ORDER — CARVEDILOL 6.25 MG/1
6.25 TABLET ORAL 2 TIMES DAILY WITH MEALS
Qty: 60 TABLET | Refills: 11 | Status: SHIPPED | OUTPATIENT
Start: 2024-08-15

## 2024-08-15 RX ORDER — ROSUVASTATIN CALCIUM 20 MG/1
20 TABLET, COATED ORAL NIGHTLY
Qty: 30 TABLET | Refills: 11 | Status: SHIPPED | OUTPATIENT
Start: 2024-08-15

## 2024-08-15 RX ORDER — FUROSEMIDE 10 MG/ML
40 INJECTION INTRAMUSCULAR; INTRAVENOUS ONCE
Status: COMPLETED | OUTPATIENT
Start: 2024-08-15 | End: 2024-08-15

## 2024-08-15 RX ORDER — FUROSEMIDE 40 MG
40 TABLET ORAL DAILY
Qty: 30 TABLET | Refills: 11 | Status: SHIPPED | OUTPATIENT
Start: 2024-08-15

## 2024-08-15 RX ORDER — SACUBITRIL AND VALSARTAN 24; 26 MG/1; MG/1
1 TABLET, FILM COATED ORAL 2 TIMES DAILY
Qty: 60 TABLET | Refills: 11 | Status: SHIPPED | OUTPATIENT
Start: 2024-08-15

## 2024-08-15 RX ORDER — HYDROCODONE BITARTRATE AND ACETAMINOPHEN 7.5; 325 MG/1; MG/1
1 TABLET ORAL EVERY 6 HOURS PRN
Qty: 25 TABLET | Refills: 0 | Status: SHIPPED | OUTPATIENT
Start: 2024-08-15

## 2024-08-15 RX ADMIN — CARVEDILOL 6.25 MG: 6.25 TABLET, FILM COATED ORAL at 08:50

## 2024-08-15 RX ADMIN — COLCHICINE 0.6 MG: 0.6 TABLET ORAL at 08:49

## 2024-08-15 RX ADMIN — VALSARTAN 80 MG: 80 TABLET, FILM COATED ORAL at 08:49

## 2024-08-15 RX ADMIN — Medication 10 ML: at 08:50

## 2024-08-15 RX ADMIN — FUROSEMIDE 40 MG: 10 INJECTION, SOLUTION INTRAMUSCULAR; INTRAVENOUS at 14:29

## 2024-08-15 RX ADMIN — OXYCODONE HYDROCHLORIDE 5 MG: 10 TABLET ORAL at 14:10

## 2024-08-15 RX ADMIN — IPRATROPIUM BROMIDE AND ALBUTEROL SULFATE 3 ML: 2.5; .5 SOLUTION RESPIRATORY (INHALATION) at 06:02

## 2024-08-15 RX ADMIN — SULFUR HEXAFLUORIDE 2 ML: KIT at 13:59

## 2024-08-15 RX ADMIN — ASPIRIN 81 MG: 81 TABLET, COATED ORAL at 08:49

## 2024-08-15 RX ADMIN — CARVEDILOL 6.25 MG: 6.25 TABLET, FILM COATED ORAL at 17:13

## 2024-08-15 NOTE — PROGRESS NOTES
[facility] Cardiothoracic Surgery In-Patient Progress Note     LOS: 8 days      POD # 6 s/p CABG x 3, AVR    Subjective  Denies chest pain or shortness of breath. On 2L.       Objective  Vital Signs  Temp:  [97.9 °F (36.6 °C)-98.2 °F (36.8 °C)] 98 °F (36.7 °C)  Heart Rate:  [76-89] 82  Resp:  [16-18] 16  BP: (114-132)/(56-68) 131/57        Physical Exam:   General Appearance: alert, appears stated age and cooperative   Lungs: clear to auscultation, respirations regular, respirations even, and respirations unlabored   Heart: regular rhythm & normal rate, normal S1, S2, no murmur, no gallop, no rub, and no click   Skin: Incision c/d/I   Sternum: Stable      Results     Results from last 7 days   Lab Units 08/14/24  0425   WBC 10*3/mm3 9.58   HEMOGLOBIN g/dL 11.5*   HEMATOCRIT % 35.8*   PLATELETS 10*3/mm3 194     Results from last 7 days   Lab Units 08/14/24  0425   SODIUM mmol/L 139   POTASSIUM mmol/L 4.2   CHLORIDE mmol/L 103   CO2 mmol/L 24.0   BUN mg/dL 41*   CREATININE mg/dL 0.93   GLUCOSE mg/dL 98   CALCIUM mg/dL 8.8     Imaging Results (Last 24 Hours)       Procedure Component Value Units Date/Time    XR Chest 1 View [[id number]] Collected: 08/15/24 0708     Updated: 08/15/24 0714    Narrative:      XR CHEST 1 VW    Date of Exam: 8/15/2024 1:11 AM EDT    Indication: PNEUMOTHORAX  postop    Comparison: 8/14/2024 and prior    Findings:  Subtle left apical pneumothorax is decreased in size in comparison. Study is limited by overlying support and monitoring apparatus. Patient is status post median sternotomy and CABG. Asymmetric increased prominence of the perihilar markings and   interstitial markings is on the left with a dependent predominance. There is a small pleural effusion. Osseous structures demonstrate no acute abnormality      Impression:      Impression:    1. Small left apical pneumothorax is decreased in size in the comparison    2. Dependent pleural parenchymal changes and mild process of the  perihilar markings on the left similar to slightly increased from comparison      Electronically Signed: Fidel Haney MD    8/15/2024 7:11 AM EDT    Workstation ID: [id number]    XR Chest 1 View [[id number]] Collected: 08/14/24 0911     Updated: 08/14/24 0915    Narrative:      XR CHEST 1 VW    Date of Exam: 8/14/2024 9:00 AM EDT    Indication: PNEUMOTHORAX  Status post chest tube removal    Comparison: Earlier today.    Findings:  Left chest tube has been removed. Small left apical pneumothorax is stable. There is some mild stable atelectasis of the left lower lobe. Heart and pulmonary vessels are within normal limits. The right lung is clear.      Impression:      Impression:  Stable small left pneumothorax after chest tube removal.      Electronically Signed: Natali Conway MD    8/14/2024 9:12 AM EDT    Workstation ID: [id number]            Assessment    I25.10, CABG + AVR 08/09/24    NSTEMI (non-ST elevated myocardial infarction)    Ischemic cardiomyopathy    Severe aortic stenosis    Bilateral carotid artery stenosis (>70%)    Former smoker    Dyslipidemia      Plan   Wean oxygen as tolerated  Ambulate  Pulmonary Toilet: IS q1 hr while awake  ASA, Statin, BB  D/C home when cardiology is in agreement    DESEAN Solo  08/15/24  07:35 EDT

## 2024-08-15 NOTE — CASE MANAGEMENT/SOCIAL WORK
Discharge Planning Assessment  [facility]     Patient Name: Preet Edwards  MRN: [id number]  Today's Date: 8/15/2024    Admit Date: 8/7/2024    Plan: HOME   Discharge Needs Assessment    No documentation.                  Discharge Plan       Row Name 08/15/24 1457       Plan    Plan HOME    Patient/Family in Agreement with Plan yes    Plan Comments Met with pt at bedside and spoke with wife via phone.  Neither HH agencies in [facility] can accept the HH referral.  CM disucssed OP PT and both pt/wife are agreeable.  CM provided pt with a written Rx to arrange at a clinic of choice in [facility].  Plan is home at DC.  CM will cont to follow for any add'l DC needs.    Final Discharge Disposition Code 01 - home or self-care                  Continued Care and Services - Admitted Since 8/7/2024       Home Medical Care       Service Provider Request Status Selected Services Address Phone Fax Patient Preferred    [facility] Declined N/A [address] [facility] [address] [phone] --       Internal Comment last updated by Lexus Rousseau, RN 8/15/2024 2150    Unable to verify insurance benefits               [facility] Declined N/A [address] [address] [phone] --                  Expected Discharge Date and Time       Expected Discharge Date Expected Discharge Time    Aug 15, 2024            Demographic Summary    No documentation.                  Functional Status    No documentation.                  Psychosocial    No documentation.                  Abuse/Neglect    No documentation.                  Legal    No documentation.                  Substance Abuse    No documentation.                  Patient Forms    No documentation.                     Lexus Rousseau, RN

## 2024-08-15 NOTE — PLAN OF CARE
Goal Outcome Evaluation:  Plan of Care Reviewed With: patient      2L NC for comfort, NSR on the monitor, VSS. No complaints of pain overnight. Felt a little SOA this morning and requested a PRN breathing treatment, which helped.

## 2024-08-15 NOTE — PROGRESS NOTES
"  [facility]   Inpatient Progress Note       LOS: 8 days   Patient Care Team:  Edgard Romero MD as PCP - General (Family Medicine)    Chief Complaint: Follow-up for NSTEMI    Subjective     Interval History:     Patient seen today. Wants to go home. No current CV complaints.     Review of Systems:   Pertinent positives noted in history, exam, and assessment. Otherwise reviewed and negative.      Objective     Vitals:  Blood pressure 131/57, pulse 82, temperature 98 °F (36.7 °C), temperature source Oral, resp. rate 16, height 172.7 cm (67.99\"), weight 84.7 kg (186 lb 11.2 oz), SpO2 99%.     Intake/Output Summary (Last 24 hours) at 8/15/2024 0820  Last data filed at 8/15/2024 0210  Gross per 24 hour   Intake 360 ml   Output 400 ml   Net -40 ml     Vitals reviewed.   Constitutional:       Appearance: Well-developed and not in distress.   Neck:      Vascular: No JVD.      Trachea: No tracheal deviation.   Pulmonary:      Effort: Pulmonary effort is normal.      Breath sounds: No rales.      Comments: Decreased bilaterally  Cardiovascular:      Normal rate. Regular rhythm.      Murmurs: There is no murmur.   Pulses:     Intact distal pulses.   Edema:     Peripheral edema absent.   Musculoskeletal:         General: No deformity. Skin:     General: Skin is warm and dry.   Neurological:      Mental Status: Alert and oriented to person, place, and time.            Results Review:     I reviewed the patient's new clinical results.    Results from last 7 days   Lab Units 08/14/24  0425   WBC 10*3/mm3 9.58   HEMOGLOBIN g/dL 11.5*   HEMATOCRIT % 35.8*   PLATELETS 10*3/mm3 194     Results from last 7 days   Lab Units 08/14/24  0425 08/10/24  0836 08/10/24  0504   SODIUM mmol/L 139   < > 144   POTASSIUM mmol/L 4.2   < > 6.0*   CHLORIDE mmol/L 103   < > 113*   CO2 mmol/L 24.0   < > 21.0*   BUN mg/dL 41*   < > 24*   CREATININE mg/dL 0.93   < > 1.88*   CALCIUM mg/dL 8.8   < > 7.9*   BILIRUBIN mg/dL  " --   --  1.7*   ALK PHOS U/L  --   --  22*   ALT (SGPT) U/L  --   --  14   AST (SGOT) U/L  --   --  36   GLUCOSE mg/dL 98   < > 215*    < > = values in this interval not displayed.     Results from last 7 days   Lab Units 08/14/24  0425   SODIUM mmol/L 139   POTASSIUM mmol/L 4.2   CHLORIDE mmol/L 103   CO2 mmol/L 24.0   BUN mg/dL 41*   CREATININE mg/dL 0.93   GLUCOSE mg/dL 98   CALCIUM mg/dL 8.8     Results from last 7 days   Lab Units 08/09/24  1809   INR  1.79*     Lab Results   Lab Value Date/Time    TROPONINT 827 (C) 08/08/2024 0826    TROPONINT 850 (C) 08/08/2024 0325     Results from last 7 days   Lab Units 08/08/24  0826   FREE T4 ng/dL 1.64     Results from last 7 days   Lab Units 08/09/24  1809   CHOLESTEROL mg/dL 80   TRIGLYCERIDES mg/dL 72   HDL CHOL mg/dL 15*   LDL CHOL mg/dL 49         Results from last 7 days   Lab Units 08/08/24  0826   HSTROP T ng/L 827*     LHC:    Multivessel coronary artery disease.    90% ulcerated proximal LAD stenosis, involves origin of moderate-sized diagonal    80% proximal left circumflex stenosis.    Small subtotally occluded OM1    Chronically occluded proximal RCA with 3+ collaterals to the large right PDA    LVEF 30%    Likely critical aortic stenosis with peak to peak gradient of 46 mmHg.  In the setting of severe LV dysfunction this is likely critical aortic stenosis    Elevated LVEDP of 38    Tele:  SR        Assessment:      I25.10, CABG + AVR 08/09/24    NSTEMI (non-ST elevated myocardial infarction)    Ischemic cardiomyopathy    Severe aortic stenosis    Bilateral carotid artery stenosis (>70%)    Former smoker    Dyslipidemia      Non-STEMI.  LHC 8/7/2024 MV CAD  CABG 8/9/24 Dr. Paige LOCKHART to LAD and VG to diag and OM sequentially  Ischemic cardiomyopathy  EF 30% by LV gram  BB held due to marginal BP  Critical aortic stenosis  Peak gradient 46 mmHg in setting of severe LV dysfunction.  AVR with 23 mm Bovine Tapia  Dyslipidemia  Carotid artery  disease  Bilateral >70% ICA disease  Acute HFrEF  Remote tobacco use resolved 14 years ago  COPD  Elevated TSH    Plan:  Patient overall CV stable.  Recheck echocardiogram with EF 35%  Transition ARB to Entresto  Can be discharged today from CV standpoint.   Follow-up with Cardiology in 4 weeks.  Will address cardiac medications in ADT.     DESEAN Ferguson    Dictated utilizing Dragon dictation

## 2024-08-15 NOTE — PROGRESS NOTES
"          Clinical Nutrition Assessment     Patient Name: Preet Edwards  YOB: 1955  MRN: [id number]  Date of Encounter: 08/15/24 14:49 EDT  Admission date: 8/7/2024  Reason for Visit: LOS    Assessment   Nutrition Assessment   Admission Diagnosis:  NSTEMI (non-ST elevated myocardial infarction) [I21.4]    Problem List:    I25.10, CABG + AVR 08/09/24    NSTEMI (non-ST elevated myocardial infarction)    Ischemic cardiomyopathy    Severe aortic stenosis    Bilateral carotid artery stenosis (>70%)    Former smoker    Dyslipidemia      PMH:   He  has a past medical history of COPD (chronic obstructive pulmonary disease) and GERD (gastroesophageal reflux disease).    PSH:  He  has a past surgical history that includes Cataract extraction; Cardiac catheterization (N/A, 8/7/2024); and coronary artery bypass graft with aortic valve repair/replacement (N/A, 8/9/2024).    Applicable Nutrition History:   8/7/24 CABG x 3 and AVR  8/9/24 Extubated    Anthropometrics     Height: Height: 172.7 cm (67.99\")  Last Filed Weight: Weight: 84.7 kg (186 lb 11.7 oz) (08/15/24 1330)  Method: Weight Method: Bed scale  BMI: BMI (Calculated): 28.4    UBW:  180# per patient  Weight change: Insignificant wt gain x 1 week     Weight      Weight (kg) Weight (lbs) Weight Method   8/7/2024 82.5 kg  181 lb 14.1 oz  Bed scale     76.431 kg  168 lb 8 oz     8/8/2024 76.34 kg  168 lb 4.8 oz  Bed scale     76.3 kg  168 lb 3.4 oz     8/9/2024 75.705 kg  166 lb 14.4 oz  Bed scale    8/14/2024 83 kg  182 lb 15.7 oz     8/15/2024 84.687 kg  186 lb 11.2 oz  Bed scale     84.7 kg  186 lb 11.7 oz       Bed wt at time of visit: 188#  Nutrition Focused Physical Exam    Date:  8/15       Pt does not meet criteria for malnutrition diagnosis, at this time.       Subjective   Reported/Observed/Food/Nutrition Related History:     Patient states he's eating okay, says probably less than he was PTA. Denies any swallowing difficulties. Pt is edentulous, has " dentures at home, okay with regular texture diet. Denies any N/V. States regular BM, last this AM. Patient denies any specific food preferences at this time. Pt accepting to trial Boost daily for additional protein. NKFA     Current Nutrition Prescription   PO: Diet: Cardiac, Diabetic, Regular/House; Healthy Heart (2-3 Na+); Consistent Carbohydrate; Fluid Consistency: Thin (IDDSI 0)  Oral Nutrition Supplement: N/A  Intake: 4 Days: 38% x 4 meals    Assessment & Plan   Nutrition Diagnosis   Date:  8/15            Updated:    Problem Increased nutrient needs (protein)   Etiology Increased demand for wound healing   Signs/Symptoms Sternal wound s/p CABG x3   Status: New    Date:  8/15 Updated:  Problem Inadequate oral intake   Etiology Weakness after surgery   Signs/Symptoms 38% x 4 meals   Status:      Goal:   Nutrition to support treatment and Increase intake      Nutrition Intervention      Follow treatment progress, Care plan reviewed, Advise alternate selection, Advised available snacks, Interview for preferences, Menu provided, Encourage intake, Supplement provided    Boost GC for additional protein, w/ breakfast  Follow and encourage PO intakes    Monitoring/Evaluation:   Per protocol, I&O, PO intake, Supplement intake, Weight, Symptoms, POC/GOC    Opal Dietrich RD eligible  Time Spent: 35 min

## 2024-08-16 ENCOUNTER — TELEPHONE (OUTPATIENT)
Dept: CARDIAC SURGERY | Facility: CLINIC | Age: 69
End: 2024-08-16
Payer: COMMERCIAL

## 2024-08-16 ENCOUNTER — TRANSCRIBE ORDERS (OUTPATIENT)
Dept: CARDIAC REHAB | Facility: HOSPITAL | Age: 69
End: 2024-08-16
Payer: COMMERCIAL

## 2024-08-16 DIAGNOSIS — Z95.1 S/P CABG (CORONARY ARTERY BYPASS GRAFT): Primary | ICD-10-CM

## 2024-08-16 DIAGNOSIS — I21.4 NSTEMI (NON-ST ELEVATED MYOCARDIAL INFARCTION): Primary | ICD-10-CM

## 2024-08-16 DIAGNOSIS — I65.23 BILATERAL CAROTID ARTERY STENOSIS: ICD-10-CM

## 2024-08-16 RX ORDER — CLOPIDOGREL BISULFATE 75 MG/1
75 TABLET ORAL DAILY
Qty: 30 TABLET | Refills: 11 | Status: SHIPPED | OUTPATIENT
Start: 2024-08-16 | End: 2025-08-16

## 2024-08-16 NOTE — DISCHARGE SUMMARY
[facility] Cardiothoracic Surgery  DISCHARGE SUMMARY    Patient Name: Preet Edwards  : 1955  MRN: [id number]    Date of Admission: 2024  7:36 PM  Date of Discharge:  8/15/2024  Primary Care Physician: Edgard Romero MD  Referred By: Provider, No Known    IP Care Team:  Patient Care Team:  Edgard Romero MD as PCP - General (Family Medicine)    Consults       Date and Time Order Name Status Description    2024  6:04 PM Inpatient Intensivist Consult - For Vent Management Completed     2024  8:27 PM Inpatient Cardiothoracic Surgery Consult Completed             Hospital Course     Presenting Problem: Coronary artery disease/severe aortic stenosis    Active Hospital Problems    Diagnosis  POA    **I25.10, CABG + AVR 24 [I25.10]  Yes    Ischemic cardiomyopathy [I25.5]  Yes    Severe aortic stenosis [I35.0]  Yes    Bilateral carotid artery stenosis (>70%) [I65.23]  Yes    Former smoker [Z87.891]  Not Applicable    Dyslipidemia [E78.5]  Yes    NSTEMI (non-ST elevated myocardial infarction) [I21.4]  Yes      Resolved Hospital Problems   No resolved problems to display.        Procedures Performed:  Procedure(s):  CORONARY ARTERY BYPASS X 3 WITH INTERNAL MAMMARY ARTERY GRAFT AND AORTIC VALVE REPAIR/REPLACEMENT, SINDY, EVH       Hospital Course:  Preet Edwards is a 68 y.o. male who was taken to the operating room on 2024 and underwent CABG x 3 with EVH of the right greater saphenous vein and AVR (# 23 bovine Tapia valve) w/ Dr. Gibson. He was taken to the ICU in stable condition and was extubated per ICU protocol. He met criteria for discharge on POD # 6, his hospital course is below.    CAD s/p CABG x 3, AVR:  : Chest tubes and pacing wires were removed. Transfer to telemetry orders were placed.  8/15: Transferred to telemetry floor  ASA, Statin, BB, Plavix    ICM/HFrEF:  Intra-op EF 25%  Repeat echo done on 8/15 LVEF 35%      Bilateral carotid artery  stenosis:  Carotid duplex: 8/8/24  -Right internal carotid artery demonstrates greater than 70% stenosis but less than near occlusion.  -Left internal carotid artery demonstrates greater than 70% stenosis but less than near occlusion.     Discharge Follow Up Recommendations for outpatient labs/diagnostics:  Follow-up with PCP in 1-2 weeks  Follow-up with CT Surgery in 2-4 weeks  Follow-up with Cardiology in 4 weeks        DO NOT REMOVE SUTURES AND/OR STAPLES THIS WILL BE ADDRESSED AT CT SURGERY FOLLOW-UP  If you have any questions or concerns please reach out to our office at [phone]    Day of Discharge     HPI:   Patient is a 68 y.o. male presents with angina.. Onset of symptoms was abrupt starting 10 minutes ago.  Symptoms are associated with n/a .  Symptoms are aggravated by n/a.   Symptoms improve with n/a. Severity 10/10 Context n/a Quality sharp     Workup has revealed none. Cardiac catheterization demonstrated three vessel disease and aortic stenosis with a valve area of not calculated centimeters squared and a gradient of 40 mm Hg. Cardiac risk factors include smoking/ tobacco exposure.      Vital Signs:    Temp:  [97.9 °F (36.6 °C)-98.2 °F (36.8 °C)] 98 °F (36.7 °C)  Heart Rate:  [76-89] 82  Resp:  [16-18] 16  BP: (114-132)/(56-68) 131/57      Physical Exam:  General Appearance: alert, appears stated age and cooperative              Lungs: clear to auscultation, respirations regular, respirations even, and respirations unlabored              Heart: regular rhythm & normal rate, normal S1, S2, no murmur, no gallop, no rub, and no click              Skin: Incision c/d/I              Sternum: Stable     Pertinent  and/or Most Recent Results     LAB RESULTS:    Operative Pathology:    Clinical Information    NSTEMI (non-ST elevated myocardial infarction)  Coronary artery disease involving native heart, unspecified vessel or lesion type, unspecified whether angina present   Final Diagnosis   AORTIC  VALVE:  Valve leaflets with moderate calcified vegetations  No bacterial vegetations identified           Lab 08/14/24  0425 08/12/24  0407 08/11/24  1418 08/11/24  0403 08/10/24  1522 08/10/24  0504 08/09/24 2333 08/09/24  1809   WBC 9.58 8.39 8.87 10.64  --  12.28*   < > 11.03*   HEMOGLOBIN 11.5* 9.6* 9.4* 8.3* 8.4* 8.8*   < > 10.5*   HEMATOCRIT 35.8* 29.8* 29.5* 26.4* 26.3* 27.0*   < > 32.2*   PLATELETS 194 113* 90* 91*  --  123*   < > 82*   NEUTROS ABS 6.62  --   --   --   --  9.92*  --   --    IMMATURE GRANS (ABS) 0.11*  --   --   --   --  0.06*  --   --    LYMPHS ABS 1.46  --   --   --   --  1.03  --   --    MONOS ABS 1.08*  --   --   --   --  1.22*  --   --    EOS ABS 0.27  --   --   --   --  0.01  --   --    MCV 92.3 92.5 92.8 95.0  --  92.8   < > 89.7   PROTIME  --   --   --   --   --   --   --  20.9*   HEPARIN ANTI-XA  --   --   --   --   --  0.10*  --   --     < > = values in this interval not displayed.         Lab 08/14/24 0425 08/12/24  0546 08/12/24  0407 08/11/24  1418 08/11/24  0403 08/10/24  1522 08/10/24  0836 08/10/24  0504 08/09/24 2333 08/09/24  1809   SODIUM 139 139  --  137 140  --   --  144 147* 144   POTASSIUM 4.2 5.0  --  5.1 5.5* 5.6* 5.4* 6.0* 3.5 4.4   CHLORIDE 103 108*  --  106 109*  --   --  113* 113* 110*   CO2 24.0 23.0  --  20.0* 20.0*  --   --  21.0* 21.0* 20.0*   ANION GAP 12.0 8.0  --  11.0 11.0  --   --  10.0 13.0 14.0   BUN 41* 38*  --  30* 26*  --   --  24* 21 22   CREATININE 0.93 1.09  --  1.30* 1.49*  --   --  1.88* 1.56* 1.40*   EGFR 90.6 74.9  --  60.6 51.4*  --   --  38.9* 48.7* 55.4*   GLUCOSE 98 125*  --  132* 130*  --   --  215* 150* 182*   CALCIUM 8.8 8.6  --  8.3* 7.8*  --   --  7.9* 8.3* 8.4*   MAGNESIUM  --   --  2.8*  --   --   --   --  3.8* 1.9  --    PHOSPHORUS  --   --  2.6  --   --   --  4.7*  --  0.5* 3.0         Lab 08/10/24  0504 08/09/24  2333 08/09/24  1809   TOTAL PROTEIN 5.7* 5.2*  --    ALBUMIN 4.3 4.2 4.1   GLOBULIN 1.4 1.0  --    ALT (SGPT) 14 11   --    AST (SGOT) 36 28  --    BILIRUBIN 1.7* 2.0*  --    ALK PHOS 22* 20*  --          Lab 08/09/24  1809   PROTIME 20.9*   INR 1.79*         Lab 08/09/24  1809   CHOLESTEROL 80   LDL CHOL 49   HDL CHOL 15*   TRIGLYCERIDES 72             Lab 08/10/24  0202 08/09/24  1813 08/09/24  1723   PH, ARTERIAL 7.328* 7.386 7.36   PCO2, ARTERIAL 40.4 33.5*  --    PO2 .0* 395.0*  --    FIO2 40 100  --    HCO3 ART 21.2 20.1  --    BASE EXCESS ART -4.4* -4.3*  --    CARBOXYHEMOGLOBIN 1.2 1.2  --      Brief Urine Lab Results       None          Microbiology Results (last 10 days)       ** No results found for the last 240 hours. **            Adult Transthoracic Echo Limited W/ Cont if Necessary Per Protocol    Result Date: 8/15/2024    Left ventricular systolic function is moderately decreased. Estimated left ventricular EF = 35% Left ventricular ejection fraction appears to be 31 - 35%.   The following left ventricular wall segments are hypokinetic: apical anterior, apical lateral and apex hypokinetic.     XR Chest 1 View    Result Date: 8/15/2024  XR CHEST 1 VW Date of Exam: 8/15/2024 1:11 AM EDT Indication: PNEUMOTHORAX postop Comparison: 8/14/2024 and prior Findings: Subtle left apical pneumothorax is decreased in size in comparison. Study is limited by overlying support and monitoring apparatus. Patient is status post median sternotomy and CABG. Asymmetric increased prominence of the perihilar markings and interstitial markings is on the left with a dependent predominance. There is a small pleural effusion. Osseous structures demonstrate no acute abnormality     Impression: 1. Small left apical pneumothorax is decreased in size in the comparison 2. Dependent pleural parenchymal changes and mild process of the perihilar markings on the left similar to slightly increased from comparison Electronically Signed: Fidel Haney MD  8/15/2024 7:11 AM EDT  Workstation ID: [id number]    XR Chest 1 View    Result Date:  8/14/2024  XR CHEST 1 VW Date of Exam: 8/14/2024 9:00 AM EDT Indication: PNEUMOTHORAX Status post chest tube removal Comparison: Earlier today. Findings: Left chest tube has been removed. Small left apical pneumothorax is stable. There is some mild stable atelectasis of the left lower lobe. Heart and pulmonary vessels are within normal limits. The right lung is clear.     Impression: Stable small left pneumothorax after chest tube removal. Electronically Signed: Natali Conway MD  8/14/2024 9:12 AM EDT  Workstation ID: [id number]    XR Chest 1 View    Result Date: 8/14/2024  XR CHEST 1 VW Date of Exam: 8/14/2024 3:55 AM EDT Indication: s/p cabg Comparison: Chest x-ray dated August 12, 2024 Findings: There are postoperative changes from midline sternotomy from coronary artery bypass grafting. The pulmonary vascular markings are normal. There is been interval removal of the right internal jugular Premier-Binta sheath and catheter. There is a left-sided thoracostomy tube in place with a small left apical pneumothorax of about 9 mm. There is mild left basilar atelectasis with a small left effusion.     Impression: 1.Interval removal of Premier-Binta catheter. 2.Left thoracostomy tube in place with a small left apical pneumothorax. 3.Left basilar atelectasis with a small left effusion. Electronically Signed: Sam Mckee MD  8/14/2024 4:02 AM EDT  Workstation ID: [id number]    XR Chest 1 View    Result Date: 8/12/2024  XR CHEST 1 VW Date of Exam: 8/12/2024 3:18 AM EDT Indication: chest tube management Comparison: Chest radiograph 8/11/2024 at 2154 hours Findings: There is a right internal jugular Premier-Binta sheath and catheter with the tip in the super vena cava. There is a left-sided chest tube in place with no pneumothorax. There are postoperative changes from midline sternotomy. There is a left pleural effusion. There is left basilar atelectasis.     Impression: Left-sided chest tube in place with no pneumothorax. Left basilar  atelectasis and left pleural effusion. Electronically Signed: Sam Mckee MD  8/12/2024 7:02 AM EDT  Workstation ID: [id number]    XR Chest 1 View    Result Date: 8/11/2024  XR CHEST 1 VW Date of Exam: 8/11/2024 9:56 PM EDT Indication: sob Comparison: Chest radiograph August 10, 2024 Findings: There is a right internal jugular [place]-Binta sheath and catheter with the tip in the super vena cava. The heart is enlarged with changes of midline sternotomy. There is a left-sided thoracostomy tube in place. There is a small left pleural effusion. There  is no pneumothorax. There is a small right pleural effusion. There is mild right basilar atelectasis. There is mild left upper lobe atelectasis.     Impression: Cardiomegaly with small bilateral pleural effusions. Mild right basilar atelectasis and left upper lobe atelectasis. Electronically Signed: Sam Mckee MD  8/11/2024 10:46 PM EDT  Workstation ID: [id number]    Pulmonary Function Test    Result Date: 8/11/2024  Spirometry was done on 8/8/2024.  Please see scanned PFT report for complete details.  FVC was 1.17 L or 28% predicted.  FEV1 was 0.95 L or 30% predicted.  FEV1 to FVC ratio was 80%. Interpretation: There is no obstruction on the study.  There is a significant nonspecific decrease in both FVC and FEV1 which is suggestive of restrictive lung disease or poor effort although effort is noted to be good per respiratory therapist.  Suggest full pulmonary function testing if clinically indicated. Electronically signed by Miguelito Nicholas MD, 08/11/24, 9:39 PM EDT.    XR Chest 1 View    Result Date: 8/10/2024  XR CHEST 1 VW Date of Exam: 8/10/2024 2:57 PM EDT Indication: Post mediastinal tube removal. Comparison: Chest x-ray 8/10/2024, 8/9/2024 Findings: Interval removal of NG/OG tube and midline thoracostomy tube. The right IJ CVC remains in place. The left thoracostomy tube remains in place. Stable cardiomediastinal silhouette with mild cardiomegaly. Redemonstration of  left base and retrocardiac opacities likely reflecting combination of small pleural fluid and atelectasis. No definite pneumothorax. Redemonstration of surgical changes of CABG and aortic valve replacement.     Impression: Removal of NG/OG tube and midline thoracostomy tube, with otherwise stable medical support devices. No definite pneumothorax. Similar retrocardiac and left base opacities likely reflecting combination of small pleural fluid and atelectasis. Electronically Signed: Fam Tran MD  8/10/2024 3:33 PM EDT  Workstation ID: [id number]    XR Chest 1 View    Result Date: 8/10/2024  XR CHEST 1 VW Date of Exam: 8/10/2024 4:20 AM EDT Indication: POD 1 Comparison:  8/9/2024 Findings: Enteric tube, right IJ catheter, mediastinal drains, left chest tube appear in similar positions. No definite pneumothorax. Status post median sternotomy and CABG. Heart size appears unchanged. There are increased left basilar opacities. Small left pleural effusion is not excluded.     Impression: 1.Tubes and lines appear in similar positions. 2.Increased left basilar opacities which could be related to atelectasis or infiltrate. Small left effusion is not excluded. Electronically Signed: Bobo Fried  8/10/2024 9:00 AM EDT  Workstation ID: [id number]    XR Chest 1 View    Result Date: 8/9/2024  XR CHEST 1 VW Date of Exam: 8/9/2024 6:31 PM EDT Indication: s/p CABG Comparison: None available Findings: The endotracheal tube tip is 3.3 cm above the josé. There is a gastric suction tube which terminates below the left hemidiaphragm. There is a midline chest tube and left basilar chest tube present. There is a right IJ introducer with [place]-Binta catheter  tip terminating at the mid SVC. There are postsurgical changes of CABG. There is mild left basilar airspace opacity, favor atelectasis. There is no pneumothorax. There are degenerative changes of the thoracic spine.     Impression: 1. Support devices appear in expected position. 2.  Left basilar airspace opacity, favor atelectasis. Electronically Signed: Dl Cardonaelor  8/9/2024 7:09 PM EDT  Workstation ID: [id number]    Central Line    Result Date: 8/9/2024  Minoo Martinez DO     8/9/2024  1:01 PM Central Line Patient reassessed immediately prior to procedure Patient location during procedure: OR Indications: vascular access Staff Anesthesiologist: Minoo Martinez DO Preanesthetic Checklist Completed: patient identified, IV checked, site marked, risks and benefits discussed, surgical consent, monitors and equipment checked, pre-op evaluation and timeout performed Central Line Prep Sterile Tech:cap, gloves, gown, mask and sterile barriers Prep: chloraprep Patient monitoring: blood pressure monitoring, continuous pulse oximetry and EKG Central Line Procedure Laterality:right Location:internal jugular Catheter Type:MAC Catheter Size:9 Fr Guidance:ultrasound guided PROCEDURE NOTE/ULTRASOUND INTERPRETATION.  Using ultrasound guidance the potential vascular sites for insertion of the catheter were visualized to determine the patency of the vessel to be used for vascular access.  After selecting the appropriate site for insertion, the needle was visualized under ultrasound being inserted into the internal jugular vein, followed by ultrasound confirmation of wire and catheter placement. There were no abnormalities seen on ultrasound; an image was taken; and the patient tolerated the procedure with no complications. Images: still images not obtained Assessment Post procedure:biopatch applied, line sutured, occlusive dressing applied and secured with tape Assessement:blood return through all ports, free fluid flow, chest x-ray ordered and Nic Test Complications:no Patient Tolerance:patient tolerated the procedure well with no apparent complications Additional Notes Smooth first pass US guided RIJMAC placement using sterile technique.  Negative nic x2. CXR to follow in ICU.     Arterial  Line    Result Date: 8/9/2024  Minoo Martinez DO     8/9/2024  1:00 PM Arterial Line Patient reassessed immediately prior to procedure Patient location during procedure: pre-op Line placed for hemodynamic monitoring. Performed By Anesthesiologist: Minoo Martinez DO Preanesthetic Checklist Completed: patient identified, IV checked, site marked, risks and benefits discussed, surgical consent, monitors and equipment checked, pre-op evaluation and timeout performed Arterial Line Prep  Sterile Tech: cap, gloves and sterile barriers Prep: ChloraPrep Patient monitoring: blood pressure monitoring, continuous pulse oximetry and EKG Arterial Line Procedure Laterality:right Location:  radial artery Catheter size: 20 G Guidance: ultrasound guided and palpation technique Number of attempts: 1 Successful placement: yes Images: still images not obtained Post Assessment Dressing Type: line sutured, occlusive dressing applied, secured with tape and wrist guard applied. Complications no Circ/Move/Sens Assessment: normal and unchanged. Patient Tolerance: patient tolerated the procedure well with no apparent complications     Intra-Op Anesthesia SINDY    Result Date: 8/9/2024  Minoo Martinez DO     8/9/2024  4:47 PM Intra-Op Anesthesia SINDY Procedure Performed: Intra-Op Anesthesia SINDY      Start Time:      End Time:  Preanesthesia Checklist: Patient identified, IV assessed, risks and benefits discussed, monitors and equipment assessed, procedure being performed at surgeon's request and anesthesia consent obtained. General Procedure Information Diagnostic Indications for Echo:  assessment of surgical repair and hemodynamic monitoring Physician Requesting Echo: Gage Gibson MD Location performed:  OR Intubated Bite block placed Heart visualized Probe Insertion:  Easy Probe Type:  Multiplane Modalities:  2D only, color flow mapping, continuous wave Doppler and pulse wave Doppler Anesthesia Information Performed Personally  Echocardiogram Comments:      Dx SINDY performed by Jovon GUZMAN in OR for CABG, AVR Surgeon: Dr. Gibson Preoperative informed consent obtained.  SINYD probe passed without difficulty.  Baseline exam: LVEF 25% by Simpsons method, global hypokinesis with LV smoke noted, Ant/AL/Lat hypokinesis; septal dyskinesis (TG view), distal inf/IS/Apical Akinesis.   RV normal sized with mild dysfunction .  No SAUL clot; +L to R shunt by PFO (0.46cm ).  Moderate central MR (VC 0.4), annulus 30mm.  Mild TR  Mild PI.  Trileaflet calcified AV with mild central AI, severe stenosis by DI (0.17); annulus 26mm,  mean gradient 24mmhg, Vmax 3.1 m/s.  No dissection noted in imaged aorta regions; nonmobile plaquing of descending aorta present (0.7 cm) and distal arch.   No significant effusions. Findings reviewed with surgeon in real time. Post CPB:  S/p mvCABG +  23mm bAVR ;  Biventricular function stable on epi, norepi inotropic support; EF improved to 35-40% by visualization, still with some inferior and septal hypokinesis.   AV prosthesis appears well seated without rocking motion, leaflets opening well, small PVL near 3oclock position on final imaging, mean gradient 5mmhg;  no new significant valvular abnormalities.  Aorta intact in visualized portions without evidence of dissection.  SINDY probe gently removed without blood present on probe.     Adult Transthoracic Echo Complete W/ Cont if Necessary Per Protocol    Result Date: 8/8/2024    Left ventricular systolic function is moderately decreased. Estimated left ventricular EF = 35%   Left ventricular wall thickness is consistent with borderline concentric hypertrophy.   The following left ventricular wall segments are hypokinetic: mid anterior, apical anterior, apical lateral, apical septal and apex hypokinetic.   Severe aortic valve stenosis is present.   Aortic valve maximum pressure gradient is 67 mmHg. Aortic valve mean pressure gradient is 42 mmHg.     Duplex Carotid Ultrasound  CAR    Result Date: 8/8/2024    Right internal carotid artery demonstrates greater than 70% stenosis but less than near occlusion.   Left internal carotid artery demonstrates greater than 70% stenosis but less than near occlusion.     Cardiac Catheterization/Vascular Study    Result Date: 8/7/2024    Multivessel coronary artery disease.   90% ulcerated proximal LAD stenosis, involves origin of moderate-sized diagonal   80% proximal left circumflex stenosis.   Small subtotally occluded OM1   Chronically occluded proximal RCA with 3+ collaterals to the large right PDA   LVEF 30%   Likely critical aortic stenosis with peak to peak gradient of 46 mmHg.  In the setting of severe LV dysfunction this is likely critical aortic stenosis   Elevated LVEDP of 38      Results for orders placed during the hospital encounter of 08/07/24    Duplex Carotid Ultrasound CAR    Interpretation Summary    Right internal carotid artery demonstrates greater than 70% stenosis but less than near occlusion.    Left internal carotid artery demonstrates greater than 70% stenosis but less than near occlusion.      Results for orders placed during the hospital encounter of 08/07/24    Duplex Carotid Ultrasound CAR    Interpretation Summary    Right internal carotid artery demonstrates greater than 70% stenosis but less than near occlusion.    Left internal carotid artery demonstrates greater than 70% stenosis but less than near occlusion.      Results for orders placed during the hospital encounter of 08/07/24    Adult Transthoracic Echo Limited W/ Cont if Necessary Per Protocol    Interpretation Summary    Left ventricular systolic function is moderately decreased. Estimated left ventricular EF = 35% Left ventricular ejection fraction appears to be 31 - 35%.    The following left ventricular wall segments are hypokinetic: apical anterior, apical lateral and apex hypokinetic.        Discharge Details        Discharge Medications        New  Medications        Instructions Start Date   aspirin 81 MG EC tablet   81 mg, Oral, Daily      carvedilol 6.25 MG tablet  Commonly known as: COREG   6.25 mg, Oral, 2 Times Daily With Meals      Entresto 24-26 MG tablet  Generic drug: sacubitril-valsartan   1 tablet, Oral, 2 Times Daily      furosemide 40 MG tablet  Commonly known as: LASIX   40 mg, Oral, Daily      HYDROcodone-acetaminophen 7.5-325 MG per tablet  Commonly known as: NORCO   1 tablet, Oral, Every 6 Hours PRN      potassium chloride 10 MEQ CR tablet   10 mEq, Oral, Daily      rosuvastatin 20 MG tablet  Commonly known as: CRESTOR   20 mg, Oral, Nightly               No Known Allergies      Discharge Disposition:  Home or Self Care    Diet:  Hospital:No active diet order      Activity:  Activity Instructions       Activity as Tolerated      Bathing Restrictions      You may shower    Type of Restriction: Bathing    Bathing Restrictions: No Tub Bath    Driving Restrictions      Type of Restriction: Driving    Driving Restrictions: No Driving While Taking Narcotics    Lifting Restrictions      Type of Restriction: Lifting    Lifting Restrictions: Lifting Restriction (Indicate Limit)    Weight Limit (Pounds): 10    Length of Lifting Restriction: until seen at next follow-up appointment            Restrictions or Other Recommendations:  No driving until seen by your cardiac surgeon at your follow up appointment. Do not drive while taking narcotics. Get up and get dressed each morning; do not stay in bed. Walking is the best form of exercise because it increases circulation throughout the body and to the heart muscle. Get plenty of sleep at night (8-10 hours). It is important for your breast bone (sternum) to heal properly after surgery.   Please follow these guidelines:  -No lifting, pulling, or pushing greater than 10 lbs for 12 weeks.   -Do not push or pull with your arms, especially when rising from a chair   -Have someone help you get up or roll to your  "side, and push off with your elbow to get out of bed.  -Avoid activities that cause you to strain or pull on your chest, such as driving a  or tractor, raking, vacuuming, moving heavy items, shoveling, opening tight jars or swinging a golf club.  -If you have \"clicking\" in your sternum, avoid activities that cause clicking until the sternum heals.  -Canes or walkers may be used only for balance. Do not place your full weight on any of these devices until the chest is completely healed (usually 12 weeks).       CODE STATUS:    Code Status and Medical Interventions: CPR (Attempt to Resuscitate); Full Support   Ordered at: 08/09/24 1804     Level Of Support Discussed With:    Patient     Code Status (Patient has no pulse and is not breathing):    CPR (Attempt to Resuscitate)     Medical Interventions (Patient has pulse or is breathing):    Full Support       Future Appointments   Date Time Provider Department Center   8/19/2024  9:30 AM Giselle Bermudez APRN [facility]   9/10/2024  1:00 PM Nasra Green APRN [facility]   9/30/2024  1:00 PM Logan Calix MD [facility]       Additional Instructions for the Follow-ups that You Need to Schedule       Ambulatory Referral to Home Health   As directed      Face to Face Visit Date: 8/13/2024   Follow-up provider for Plan of Care?: I treated the patient in an acute care facility and will not continue treatment after discharge.   Follow-up provider: RAUL MAYER [[id number]]   Reason/Clinical Findings: NSTEMI   Describe mobility limitations that make leaving home difficult: impaired functional mobility, gait, balance, and endurance   Nursing/Therapeutic Services Requested: Physical Therapy Occupational Therapy   PT orders: Home safety assessment Strengthening   Occupational orders: Home safety assessment Strengthening   Frequency: 1 Week 1        Ambulatory Referral to Physical Therapy for Evaluation & Treatment   As directed      Specialty " needed: Evaluate and treat   Follow-up needed: Yes        Call MD With Problems / Concerns   As directed      Instructions:   Please call the CT Surgery office with any questions or concerns you may have [phone]    Order Comments: Instructions: Please call the CT Surgery office with any questions or concerns you may have [phone]         Discharge Follow-up with PCP   As directed       Currently Documented PCP:    Edgard Romero MD    PCP Phone Number:    [phone]     Follow Up Details: 1 week for BP check and BMP        Discharge Follow-up with Specialty: Cardiology; 1 Month   As directed      Specialty: Cardiology   Follow Up: 1 Month        Discharge Follow-up with Specialty: Cardiothoracic Surgery   As directed      Follow Up Details: Follow Up in 2-4 Weeks   Specialty: Cardiothoracic Surgery        Discharge Follow-up with Specialty: Dr. Donovan in [facility]; 3 Weeks   As directed      Specialty: Dr. Donovan in [facility]   Follow Up: 3 Weeks        Discharge Follow-up with Specialty: [facility]; 1 Week   As directed      [facility]ty: [facility]   Follow Up: 1 Week   Follow Up Details: Follow Up With [facility] Within 7 Days of Discharge. If Discharged on a Weekend, Schedule Follow Up For The Following Friday at 0900.        Cardiac Rehab Evaluation and Enrollment   Aug 20, 2024      Reason for Consult: Education                Discharge Education:  Tobacco Cessation:  I advised patient to quit, and offered support.  Post-Op Education:  Patient was advised on responsible use of opioids in the post-surgical setting.  Patient was advised these medications are highly addictive.  Patient advised on proper disposal of unused opioid medication and was urged to discard any unused opioid medication. I reviewed the patient's sternal precautions and outlined the physical activity suitable for each benchmark at the 6-week 3-month and 1 year intervals.  Wound Care:  Patient  educated regarding care of post-operative wounds.  Patient is to wash with plain soap and water and pat dry.  Patient is not to use salves on the incision sites.  Patient instructed regarding the signs and symptoms of infection and when to call the office with any concerns.  SBE Education/Valve Education: Symptoms of acute IE usually begin with fever (102°-104°), chills, fast heart rate, fatigue, night sweats, aching joints and muscles, persistent cough, or swelling in the feet, legs or abdomen. You can reduce the risk of IE by maintaining good oral health through regular professional dental care and the use of dental products such as manual, powered and ultrasonic toothbrushes; dental floss; and other plaque-removal devices. Inform your dentist, family doctor (PCP) or other health care professional prior to any surgeries and/or procedures that you have had heart valve surgery.  Prophylactic antibiotics are always recommended prior to any surgical procedures following heart valve replacement.    Special Instructions:   1.  Keep incisions clean and dry at all times. Take a shower daily. Do not take a tub bath or use hot tubs until seen by the cardiac surgeon in your follow-up visit. Clean  your body and incisions daily with Dial soap and water. Always use a clean washcloth. Do not scrub your incision(s). Do not re-use dressings on your incision(s). Do not use any lotions, creams, oils, powders, antibiotic ointment (i.e., Neosporin), peroxide, alcohol, or iodine UNLESS told to do by the cardiac surgeon.  Patient may shower, but no tub baths until CT Surgery followup.   2.  No lifting over 10 lbs until CT Surgery follow up.  3.  No driving until released to do so by the surgeon.      Surgical Leg Precautions:   -Avoid sitting in one position or standing for long periods of time.  -Do not cross your legs.  -Keep your legs elevated while sitting  -Do not use any lotions, creams, oils, powders, antibiotic ointment (i.e.  Neosporin), peroxide, alcohol, or iodine unless specifically prescribed by the cardiac surgeon.  -If elastic stockings (RICCARDO hose) were prescribed to you, wear the stockings while you are up for at least two weeks after discharge. The stockings help decrease swelling. However, remove stockings at bedtime. Wash the stockings with mild soap and water, and make sure they are completely dry before you put them back on.    When to Call the Cardiac Surgeon:  -Increased swelling, redness, tenderness, and drainage  -Increased warmth in the skin around an incision  -Large amount of clear or pinkish drainage  -Sudden increased amount of drainage  -White, yellow, or greenish drainage  -Odor, which may be foul or sweet, coming from an incision  -An opening in your incisions  -Temperature higher than 101 degrees Fahrenheit   -Temperature higher than 100 degrees Fahrenheit two times within 24 hours  -Do not hesitate to call the cardiac surgery nurse navigator or cardiac surgeon if you have concerns or questions.     *The [facility] cardiac surgery nurse navigator is available Monday-Friday 8 a.m. to 4:30 p.m. [phone]  Call 911:  -Chest pain (pain similar to what you experienced prior to surgery)  -Fainting spells  -Bright red stool  -Vomiting bright red blood  -Shortness of breath not relieved by rest  -Sudden numbness or weakness in arms or legs  -Sudden, severe headache  -Heart rate faster than 150 beats/minute with shortness of breath or a new irregular heart rate    Sandra Mays, DESEAN  08/16/24  13:51 EDT    Time: I spent 35 minutes on this discharge activity which included: face-to-face encounter with the patient, reviewing the data in the system, coordination of the care with the nursing staff as well as consultants, documentation, and entering orders.

## 2024-08-16 NOTE — OUTREACH NOTE
Prep Survey      Flowsheet Row Responses   [facility] facility patient discharged from? [place]   Is LACE score < 7 ? No   Eligibility Readm Mgmt   Discharge diagnosis NSTEMI, CABG x 3 using leg vein harvest, aortic valve repair/replacement   Does the patient have one of the following disease processes/diagnoses(primary or secondary)? Cardiothoracic surgery   Does the patient have Home health ordered? No   Is there a DME ordered? No   Prep survey completed? Yes            Nanci GOINS - Registered Nurse

## 2024-08-16 NOTE — TELEPHONE ENCOUNTER
Spoke to Mr. Edwards and his wife about sending in a prescription for Plavix to his local pharmacy. Explained to him and his wife this is being sent in for recent NSTEMI and his bilateral carotid artery disease. Also educated patient about increased risk of bleeding. They have no further questions or concerns at this time.

## 2024-08-19 ENCOUNTER — OFFICE VISIT (OUTPATIENT)
Dept: CARDIOLOGY | Facility: HOSPITAL | Age: 69
End: 2024-08-19
Payer: COMMERCIAL

## 2024-08-19 VITALS
DIASTOLIC BLOOD PRESSURE: 62 MMHG | BODY MASS INDEX: 28.04 KG/M2 | WEIGHT: 185 LBS | SYSTOLIC BLOOD PRESSURE: 138 MMHG | OXYGEN SATURATION: 96 % | HEIGHT: 68 IN | HEART RATE: 92 BPM

## 2024-08-19 DIAGNOSIS — I65.23 BILATERAL CAROTID ARTERY STENOSIS: ICD-10-CM

## 2024-08-19 DIAGNOSIS — I25.5 ISCHEMIC CARDIOMYOPATHY: Primary | ICD-10-CM

## 2024-08-19 DIAGNOSIS — I50.21 ACUTE HFREF (HEART FAILURE WITH REDUCED EJECTION FRACTION): ICD-10-CM

## 2024-08-19 DIAGNOSIS — I25.10 CORONARY ARTERY DISEASE INVOLVING NATIVE HEART, UNSPECIFIED VESSEL OR LESION TYPE, UNSPECIFIED WHETHER ANGINA PRESENT: ICD-10-CM

## 2024-08-19 PROCEDURE — 99215 OFFICE O/P EST HI 40 MIN: CPT | Performed by: NURSE PRACTITIONER

## 2024-08-19 NOTE — PROGRESS NOTES
Chief Complaint  Post-op Open Heart Surgery    Subjective      History of Present Illness {CC  Problem List  Visit  Diagnosis   Encounters  Notes  Medications  Labs  Result Review Imaging  Media :23}     Preet Edwards, 68 y.o. male with past medical history significant only for remote tobacco use, who presents to [facility] for Post-op Open Heart Surgery  Patient presented initially to outlying facility and was found to have ST elevation, and positive troponins in addition to worsening dyspnea.  Patient was transferred to [facility] on 8/7 for cardiology evaluation.  Patient was taken for emergent left heart catheterization which showed three-vessel CAD.  Patient was then recommended to undergo surgical evaluation for multivessel CAD, and AVR.  On 8/9, patient underwent CABG x 3 with aortic valve replacement, and EVH.  Carotid ultrasound revealed greater than 70% stenosis of right and left ICA.  Echocardiogram showed reduced ejection fraction of 35%.  He ultimately met discharge criteria on postop day #6 and was discharged on 8/16.  He was discharged on aspirin, carvedilol, Entresto, Lasix, potassium, and rosuvastatin.    Since time of discharge from our facility, patient's post operative pain has been well controlled.  He otherwise denies exertional chest pain or pressure.  Patient does at times endorse dyspnea on exertion.  Patient has a pulse oximeter at home which he believes to be within normal limits.  He also endorses some lower extremity edema which is mildly improved with Lasix.  He currently denies palpitations, and syncope.  He does have mild dizziness especially during times of position changes.  Patient and family member have been checking his blood pressure at home which has been ranging from 115-130 systolic.  Heart rate around 90.  Patient continues to utilize his postoperative pillow and incentive spirometer.  He has intermittently checked his  "weight, but does not complete daily weights.  Upon further discussion, patient admits to inadequate water intake.  He drinks minimal water throughout the day, and has poor appetite most recently.      Objective     Vital Signs:   Vitals:    08/19/24 0944 08/19/24 0945   BP: 149/65 138/62   BP Location: Left arm Left arm   Patient Position: Sitting Standing   Pulse: 90 92   SpO2: 96%    Weight: 83.9 kg (185 lb)    Height: 172.7 cm (68\")      Body mass index is 28.13 kg/m².  Physical Exam  Vitals and nursing note reviewed.   Constitutional:       Appearance: Normal appearance.   HENT:      Head: Normocephalic.   Eyes:      Pupils: Pupils are equal, round, and reactive to light.   Cardiovascular:      Rate and Rhythm: Normal rate and regular rhythm.      Pulses: Normal pulses.      Heart sounds: Normal heart sounds. No murmur heard.  Pulmonary:      Effort: Pulmonary effort is normal.      Breath sounds: Normal breath sounds.   Abdominal:      General: Bowel sounds are normal.      Palpations: Abdomen is soft.   Musculoskeletal:         General: Normal range of motion.      Cervical back: Normal range of motion.      Right lower leg: No edema.      Left lower leg: No edema.   Skin:     General: Skin is warm and dry.      Capillary Refill: Capillary refill takes less than 2 seconds.      Comments: Midsternal incision with edges well-approximated.  No active drainage or bleeding.  Bilateral EVH sites also with edges well-approximated.  There is noted erythema to the left leg worse than right, but no active signs of infection, or pain.   Neurological:      Mental Status: He is alert and oriented to person, place, and time.   Psychiatric:         Mood and Affect: Mood normal.         Thought Content: Thought content normal.                Data Reviewed:{ Labs  Result Review  Imaging  Med Tab  Media :23}     Lab Results   Component Value Date    WBC 9.58 08/14/2024    HGB 11.5 (L) 08/14/2024    HCT 35.8 (L) 08/14/2024 "    MCV 92.3 08/14/2024     08/14/2024      Lab Results   Component Value Date    GLUCOSE 98 08/14/2024    BUN 41 (H) 08/14/2024    CREATININE 0.93 08/14/2024     08/14/2024    K 4.2 08/14/2024     08/14/2024    CALCIUM 8.8 08/14/2024    PROTEINTOT 5.7 (L) 08/10/2024    ALBUMIN 4.3 08/10/2024    ALT 14 08/10/2024    AST 36 08/10/2024    ALKPHOS 22 (L) 08/10/2024    BILITOT 1.7 (H) 08/10/2024    GLOB 1.4 08/10/2024    AGRATIO 3.1 08/10/2024    BCR 44.1 (H) 08/14/2024    ANIONGAP 12.0 08/14/2024    EGFR 90.6 08/14/2024      Lab Results   Component Value Date    CHOL 80 08/09/2024    TRIG 72 08/09/2024    HDL 15 (L) 08/09/2024    LDL 49 08/09/2024      Lab Results   Component Value Date    TROPONINT 827 (C) 08/08/2024      Lab Results   Component Value Date    TSH 4.230 (H) 08/08/2024      Adult Transthoracic Echo Limited W/ Cont if Necessary Per Protocol (08/15/2024 13:59)  ECG 12 Lead Other; s/p cabg (08/10/2024 04:05)  Duplex Carotid Ultrasound CAR (08/08/2024 11:26)  Adult Transthoracic Echo Complete W/ Cont if Necessary Per Protocol (08/08/2024 15:18)  ECG 12 Lead Chest Pain (08/08/2024 06:09)  Cardiac Catheterization/Vascular Study (08/07/2024 19:56)    Assessment & Plan   Assessment and Plan {CC Problem List  Visit Diagnosis  ROS  Review (Popup)  Health Maintenance  Quality  BestPractice  Medications  SmartSets  SnapShot Encounters  Media :23}     1. Ischemic cardiomyopathy  -Most recent echocardiogram reviewed and discussed today with patient  -Heart failure education provided today including signs and symptoms, causes of heart failure, medications, daily weights, low sodium diet (less than 2000 mg per day), 1.5L fluid restriction and daily physical activity as tolerated. Reviewed HF Zones with patient and family.   -Patient's current medication regimen of carvedilol 6.25 mg twice daily, Lasix 40 mg daily, potassium 10 mEq daily, and Entresto 24-26 mg twice daily.  -Patient does  endorse some mild lower extremity edema, but also endorses an adequate water intake.  Discussed with patient that he should increase his water intake to approximately 1.5 L  -In attempt to improve GDMT, we will also initiate Jardiance at 10 mg to be taken daily.   -Samples of Jardiance provided today to patient.  Lot number [id number], expiration 1/2026.  #4 bottles  - empagliflozin (Jardiance) 10 MG tablet tablet; Take 1 tablet by mouth Daily.  Dispense: 30 tablet; Refill: 1    2. Acute HFrEF (heart failure with reduced ejection fraction)  -Most recent echocardiogram reviewed and discussed today with patient  -Heart failure education provided today including signs and symptoms, causes of heart failure, medications, daily weights, low sodium diet (less than 2000 mg per day), 1.5L fluid restriction and daily physical activity as tolerated. Reviewed HF Zones with patient and family.   -Patient's current medication regimen of carvedilol 6.25 mg twice daily, Lasix 40 mg daily, potassium 10 mEq daily, and Entresto 24-26 mg twice daily.  -Patient does endorse some mild lower extremity edema, but also endorses an adequate water intake.  Discussed with patient that he should increase his water intake to approximately 1.5 L  -In attempt to improve GDMT, we will also initiate Jardiance at 10 mg to be taken daily.   -Samples of Jardiance provided today to patient.  Lot number [id number], expiration 1/2026.  #4 bottles  -Patient will need labs in approximately 2 weeks to assess for renal function and potassium level    3. Coronary artery disease involving native heart, unspecified vessel or lesion type, unspecified whether angina present  -On 8/9, patient underwent CABG x 3 with aortic valve replacement, and EVH per Dr. Gibson  -Currently, patient has home health assisting with his wound assessments, and vital sign monitoring  -Patient encouraged to continue close follow-up with CT surgery as scheduled next month.  At that time,  patient should plan to participate in cardiac rehab once cleared by CT surgery  -Patient to continue aspirin 81 mg daily, Plavix 75 mg daily, and rosuvastatin 20 mg daily  -Reinforced to patient importance of restriction such as driving, lifting, and utilization of postoperative pillow and incentive spirometer  -Patient to continue close follow-up with Dr. Calix next month as scheduled    4.  Carotid artery stenosis  -Carotid ultrasound revealed greater than 70% stenosis of right and left ICA.   -Patient currently maintained on aspirin, Plavix, and statin.  Recommend to continue            Patient with newly reduced ejection fraction.  He will follow-up in 2 weeks for further adjustment if able of GDMT, and updated labs.  Patient was strongly encouraged to reach out prior to that time if his symptoms change or worsen.  He has follow-up with primary care scheduled this week as well.  Encouraged strongly to keep that appointment as well.    Follow Up {Instructions Charge Capture  Follow-up Communications :23}     Return in about 2 weeks (around 9/2/2024) for Heart Failure.    Time Spent: I spent 50 minutes caring for Preet Edwards on this date of service. This time includes time spent by me in the following activities: preparing for the visit, reviewing tests, obtaining and/or reviewing a separately obtained history, performing a medically appropriate examination and/or evaluation, counseling and educating the patient/family/caregiver, documenting information in the medical record and independently interpreting results and communicating that information with the patient/family/caregiver. All time noted occurred on the date of service.    Patient was given instructions and counseling regarding his condition or for health maintenance advice. Please see specific information pulled into the AVS if appropriate.  Patient was instructed to call the [facility] with any questions, concerns, or worsening  symptoms.    Dictated Utilizing Dragon Dictation   Please note that portions of this note were completed with a voice recognition program.   Part of this note may be an electronic transcription/translation of spoken language to printed text using the Dragon Dictation System.

## 2024-08-19 NOTE — PATIENT INSTRUCTIONS
Start to check weight daily.  Do so in the morning after voiding.  Start to check blood pressure and heart rate also daily.  Complete this 2 to 3 hours after taking morning medication and after sitting and resting for 10 to 15 minutes  Goal blood pressure consistently less than 140/90  Call clinic for any significant weight gain, or change in symptoms.  Start Jardiance 10 mg once daily

## 2024-08-20 ENCOUNTER — READMISSION MANAGEMENT (OUTPATIENT)
Dept: CALL CENTER | Facility: HOSPITAL | Age: 69
End: 2024-08-20
Payer: COMMERCIAL

## 2024-08-20 NOTE — OUTREACH NOTE
CT Surgery Week 1 Survey      Flowsheet Row Responses   [facility] patient discharged from? [place]   Does the patient have one of the following disease processes/diagnoses(primary or secondary)? Cardiothoracic surgery   Week 1 attempt successful? Yes   Call start time 1217   Call end time 1225   Discharge diagnosis NSTEMI, CABG x 3 using leg vein harvest, aortic valve repair/replacement   Meds reviewed with patient/caregiver? Yes   Is the patient having any side effects they believe may be caused by any medication additions or changes? No   Does the patient have all medications related to this admission filled (includes all antibiotics, pain medications, cardiac medications, etc.) Yes   Is the patient taking all medications as directed (includes completed medication regime)? Yes   Medication comments Family is assisting with medication   Does the patient have a primary care provider?  Yes   Does the patient have an appointment scheduled with their C/T surgeon? Yes   Has the patient kept scheduled appointments due by today? Yes   What is the Home health agency?  [facility]   Has home health visited the patient within 72 hours of discharge? Yes   Psychosocial issues? No   Did the patient receive a copy of their discharge instructions? Yes   Nursing interventions Reviewed instructions with patient   What is the patient's perception of their health status since discharge? Same  [Pt brief and  voices no questions or concerns, has a slight cough during call.  Encouraged use of IS, v/u.]   Nursing interventions Nurse provided patient education   Nursing interventions Reassured on normal signs of recovery   Is the patient /caregiver able to teach back basic post-op care? Continue use of incentive spirometry at least 1 week post discharge, Practice cough and deep breath every 4 hours while awake, Lifting as instructed by MD in discharge instructions, Use a clean wash cloth and antibacterial bar or liquid soap to clean  incisions, No tub bath, swimming, or hot tub until instructed by MD, Shower daily, Hold pillow to support chest when coughing   Is the patient/caregiver able to teach back signs and symptoms of incisional infection? Increased redness, swelling or pain at the incisonal site, Increased drainage or bleeding, Incisional warmth, Pus or odor from incision, Fever  [reviewed]   Is the patient/caregiver able to teach back steps to recovery at home? Rest and rebuild strength, gradually increase activity, Weigh daily  [Pt reports he has started weighing now, denies edema]   Is the patient /caregiver able to teach back the importance of cardiac rehab? No  [uncertain regarding particpation in rehab at this time]   Nursing interventions Referral made to cardiac rehab   Is the patient/caregiver able to teach back the hierarchy of who to call/visit for symptoms/problems? PCP, Specialist, Home health nurse, Urgent Care, ED, 911 Yes   Week 1 call completed? Yes   Call end time 1225              JULIAN HICKS - Registered Nurse

## 2024-08-23 RX ORDER — HYDROCODONE BITARTRATE AND ACETAMINOPHEN 5; 325 MG/1; MG/1
1 TABLET ORAL EVERY 8 HOURS PRN
Qty: 10 TABLET | Refills: 0 | Status: SHIPPED | OUTPATIENT
Start: 2024-08-23

## 2024-08-27 ENCOUNTER — READMISSION MANAGEMENT (OUTPATIENT)
Dept: CALL CENTER | Facility: HOSPITAL | Age: 69
End: 2024-08-27
Payer: COMMERCIAL

## 2024-08-27 NOTE — OUTREACH NOTE
CT Surgery Week 2 Survey      Flowsheet Row Responses   [facility] patient discharged from? [facility]   Does the patient have one of the following disease processes/diagnoses(primary or secondary)? Cardiothoracic surgery   Week 2 attempt successful? Yes   Call start time 1415   Call end time 1431   Discharge diagnosis NSTEMI, CABG x 3 using leg vein harvest, aortic valve repair/replacement   Person spoke with today (if not patient) and relationship Patient   Meds reviewed with patient/caregiver? Yes   Is the patient having any side effects they believe may be caused by any medication additions or changes? Yes   Side effects comments  blood pressure is low after giving the blood pressure medications. Wife reports systolic goes as low as 90 and patient feels weak and tired. Advised wife to contact CTS and update MD, have them advise on medications.   Does the patient have all medications related to this admission filled (includes all antibiotics, pain medications, cardiac medications, etc.) Yes   Is the patient taking all medications as directed (includes completed medication regime)? Yes   Comments regarding appointments Cardiology f/u appt on 9/3/24 at 0945 AM with DESEAN Bray. CTS f/u appt on 9/12/24 at 1:00 PM with DESEAN Mendes.   Does the patient have a primary care provider?  Yes   Does the patient have an appointment scheduled with their C/T surgeon? Yes   Comments regarding PCP PCP--Dr. Edgard Romero   Has the patient kept scheduled appointments due by today? N/A   Comments Verified all appts with wife, Praveen.   What is the Home health agency?  [facility]   Has home health visited the patient within 72 hours of discharge? Yes   Home health comments PT and SN are visiting per wife.   Psychosocial issues? No   Comments Wife states patient is doing well. He is up and moving around the home. She is monitoring his blood pressure closely.   Did the patient receive a copy of their discharge  instructions? Yes   Nursing interventions Reviewed instructions with patient   What is the patient's perception of their health status since discharge? Improving   Nursing interventions Nurse provided patient education   Is the patient/caregiver able to teach back normal signs of recovery? Pain or discomfort at incisional site, Nausea and lack of appetite   Nursing interventions Reassured on normal signs of recovery   Is the patient /caregiver able to teach back basic post-op care? Practice cough and deep breath every 4 hours while awake, Hold pillow to support chest when coughing, Shower daily, No tub bath, swimming, or hot tub until instructed by MD, Use a clean wash cloth and antibacterial bar or liquid soap to clean incisions, If the steri-strips are falling off, it is okay to remove them. (If applicable), Lifting as instructed by MD in discharge instructions   Is the patient/caregiver able to teach back signs and symptoms of incisional infection? Increased redness, swelling or pain at the incisonal site, Increased drainage or bleeding, Incisional warmth, Pus or odor from incision, Fever   Is the patient/caregiver able to teach back steps to recovery at home? Set small, achievable goals for return to baseline health, Rest and rebuild strength, gradually increase activity   Is the patient /caregiver able to teach back the importance of cardiac rehab? Yes   Nursing interventions Referral made to cardiac rehab   If the patient is a current smoker, are they able to teach back resources for cessation? Not a smoker   Is the patient/caregiver able to teach back the hierarchy of who to call/visit for symptoms/problems? PCP, Specialist, Home health nurse, Urgent Care, ED, 911 Yes   Additional teach back comments Wife asked about Cardiac Rehab, advised her that CTS would give medical clearance to start Cardiac Rehab. She will discuss at his post op appt on 9/12/24.   Week 2 call completed? Yes   Graduated Yes   Is the  patient interested in additional calls from an ambulatory ? No   Would this patient benefit from a Referral to [facility] Social Work? No   Wrap up additional comments Answered all questions. No needs or concerns at this time.   Call end time 1431            Regi NOLEN - Registered Nurse

## 2024-08-28 ENCOUNTER — TELEPHONE (OUTPATIENT)
Dept: CARDIOLOGY | Facility: CLINIC | Age: 69
End: 2024-08-28
Payer: COMMERCIAL

## 2024-08-28 NOTE — TELEPHONE ENCOUNTER
Wife left  stating that patient's BP has been low. Systolic 90s. Attempted to contact wife, DEBBY to return call.

## 2024-08-28 NOTE — PROGRESS NOTES
[facility] Cardiothoracic Surgery Office Follow Up Note     Date of Encounter: 2024     Name: Preet Edwards  : 1955     Referred By: No ref. provider found  PCP: Edgard Roemro MD    Chief Complaint:    Chief Complaint   Patient presents with    Coronary Artery Disease     Hospital follow up s/p CABG,AVR 24.       Subjective      History of Present Illness:    Preet Edwards is a 68 y.o. male with a past medical history of ischemic cardiomyopathy, CAD s/p CABG x 3, NSTEMI, severe aortic stenosis s/p AVR, hyperlipidemia, COPD, GERD, bilateral carotid artery disease and former smoker. He underwent CABG x 3 with EVH of the right greater saphenous vein and AVR (# 23 bovine Tapia valve) w/ Dr. Gibson on 24. He presents today in office for his first postoperative follow-up.  He does report some mild dyspnea with exertion.  Denies any chest pain, leg swelling, or clicking/popping sensations to his sternum. States overall has been doing well at home.  His wife is present with him today.    Review of Systems:  Review of Systems   Constitutional: Positive for night sweats. Negative for chills, decreased appetite, diaphoresis, fever, malaise/fatigue and weight loss.   HENT: Negative.  Negative for congestion, hoarse voice and sore throat.    Cardiovascular:  Positive for dyspnea on exertion. Negative for chest pain, irregular heartbeat, leg swelling, near-syncope, orthopnea, palpitations, paroxysmal nocturnal dyspnea and syncope.   Respiratory:  Positive for cough. Negative for hemoptysis, shortness of breath, sleep disturbances due to breathing, snoring, sputum production and wheezing.    Hematologic/Lymphatic: Negative for adenopathy and bleeding problem. Bruises/bleeds easily.   Skin: Negative.  Negative for color change, dry skin, itching, poor wound healing and rash.   Musculoskeletal: Negative.  Negative for falls and muscle weakness.   Gastrointestinal: Negative.  Negative for  abdominal pain, anorexia, constipation, diarrhea, nausea and vomiting.   Genitourinary: Negative.  Negative for dysuria and frequency.   Neurological:  Positive for dizziness and loss of balance. Negative for difficulty with concentration, headaches, numbness, paresthesias, seizures and weakness.   Psychiatric/Behavioral: Negative.  Negative for altered mental status, depression and memory loss. The patient does not have insomnia and is not nervous/anxious.    Allergic/Immunologic: Negative.  Negative for persistent infections.       I have reviewed the following portions of the patient's history: problem list, current medications, allergies, past surgical history, past medical history, past social history, past family history, and ROS and confirm it's accurate.    Allergies:  No Known Allergies    Medications:      Current Outpatient Medications:     Anoro Ellipta 62.5-25 MCG/ACT aerosol powder  inhaler, Inhale 1 puff Daily., Disp: , Rfl:     aspirin 81 MG EC tablet, Take 1 tablet by mouth Daily., Disp: 90 tablet, Rfl: 1    carvedilol (COREG) 6.25 MG tablet, Take 1 tablet by mouth 2 (Two) Times a Day With Meals., Disp: 60 tablet, Rfl: 11    clopidogrel (Plavix) 75 MG tablet, Take 1 tablet by mouth Daily., Disp: 30 tablet, Rfl: 11    empagliflozin (Jardiance) 10 MG tablet tablet, Take 1 tablet by mouth Daily., Disp: 30 tablet, Rfl: 1    furosemide (LASIX) 40 MG tablet, Take 1 tablet by mouth Daily., Disp: 30 tablet, Rfl: 11    HYDROcodone-acetaminophen (Norco) 5-325 MG per tablet, Take 1 tablet by mouth Every 8 (Eight) Hours As Needed for Severe Pain., Disp: 10 tablet, Rfl: 0    HYDROcodone-acetaminophen (NORCO) 7.5-325 MG per tablet, Take 1 tablet by mouth Every 6 (Six) Hours As Needed for Moderate Pain., Disp: 25 tablet, Rfl: 0    levalbuterol (XOPENEX) 0.63 MG/3ML nebulizer solution, Take 1 ampule by nebulization Every 6 (Six) Hours As Needed., Disp: , Rfl:     levoFLOXacin (LEVAQUIN) 500 MG tablet, Take 1 tablet  by mouth Daily., Disp: , Rfl:     potassium chloride 10 MEQ CR tablet, Take 1 tablet by mouth Daily., Disp: 30 tablet, Rfl: 11    rosuvastatin (CRESTOR) 20 MG tablet, Take 1 tablet by mouth Every Night., Disp: 30 tablet, Rfl: 11    sacubitril-valsartan (Entresto) 24-26 MG tablet, Take 1 tablet by mouth 2 (Two) Times a Day., Disp: 60 tablet, Rfl: 11    History:   Past Medical History:   Diagnosis Date    COPD (chronic obstructive pulmonary disease)     GERD (gastroesophageal reflux disease)        Past Surgical History:   Procedure Laterality Date    CARDIAC CATHETERIZATION N/A 2024    Procedure: Left Heart Cath;  Surgeon: Logan Calix MD;  Location:  [facility];  Service: Cardiovascular;  Laterality: N/A;    CATARACT EXTRACTION      CORONARY ARTERY BYPASS GRAFT WITH AORTIC VALVE REPAIR/REPLACEMENT N/A 2024    Procedure: CORONARY ARTERY BYPASS X 3 WITH INTERNAL MAMMARY ARTERY GRAFT AND AORTIC VALVE REPAIR/REPLACEMENT, SINDY, EVH;  Surgeon: Gage Gibson MD;  Location: [facility];  Service: Cardiothoracic;  Laterality: N/A;       Social History     Socioeconomic History    Marital status:     Number of children: 4   Tobacco Use    Smoking status: Former     Current packs/day: 0.00     Types: Cigarettes     Quit date:      Years since quittin.6     Passive exposure: Never    Smokeless tobacco: Never   Vaping Use    Vaping status: Former   Substance and Sexual Activity    Alcohol use: Never    Drug use: Not Currently    Sexual activity: Defer        Family History   Problem Relation Age of Onset    Heart disease Mother     Heart attack Mother     Diabetes Mother     Cancer Father     Lung cancer Father     No Known Problems Sister     No Known Problems Sister     Heart attack Brother     Heart disease Brother     Heart attack Brother     Heart disease Brother        Objective     Physical Exam:  Vitals:    24 1236 24 1237   BP: 117/52 113/52   BP Location: Right  "arm Left arm   Patient Position: Sitting Sitting   Pulse: 80    Temp: 97.1 °F (36.2 °C)    SpO2: 98%    Weight: 74.4 kg (164 lb)    Height: 172.7 cm (68\")  Comment: patient reports       Body mass index is 24.94 kg/m².    Physical Exam  Constitutional:       Appearance: Normal appearance.   HENT:      Head: Normocephalic.   Neck:      Vascular: Carotid bruit present.   Cardiovascular:      Rate and Rhythm: Normal rate.      Pulses: Normal pulses.   Pulmonary:      Effort: Pulmonary effort is normal.   Chest:      Comments: Sternum is stable  Abdominal:      General: Abdomen is flat.      Palpations: Abdomen is soft.   Musculoskeletal:         General: Normal range of motion.   Skin:     Comments: See images below  Incisions are well healed without erythema, tenderness, or warmth   Neurological:      Mental Status: He is alert.                     Imaging/Labs:          Personally reviewed CXR images. Heart size appears stable.  No pleural effusion seen.  Awaiting official radiology report.    XR Chest 1 View    Result Date: 8/15/2024  Impression: 1. Small left apical pneumothorax is decreased in size in the comparison 2. Dependent pleural parenchymal changes and mild process of the perihilar markings on the left similar to slightly increased from comparison Electronically Signed: Fidel Haney MD  8/15/2024 7:11 AM EDT  Workstation ID: [id number]    XR Chest 1 View    Result Date: 8/14/2024  Impression: Stable small left pneumothorax after chest tube removal. Electronically Signed: Natali Conway MD  8/14/2024 9:12 AM EDT  Workstation ID: [id number]    XR Chest 1 View    Result Date: 8/14/2024  Impression: 1.Interval removal of [place]-Binta catheter. 2.Left thoracostomy tube in place with a small left apical pneumothorax. 3.Left basilar atelectasis with a small left effusion. Electronically Signed: Sam Mckee MD  8/14/2024 4:02 AM EDT  Workstation ID: [id number]    XR Chest 1 View    Result Date: 8/12/2024  Impression: " Left-sided chest tube in place with no pneumothorax. Left basilar atelectasis and left pleural effusion. Electronically Signed: Sam Mckee MD  8/12/2024 7:02 AM EDT  Workstation ID: [id number]    XR Chest 1 View    Result Date: 8/11/2024  Impression: Cardiomegaly with small bilateral pleural effusions. Mild right basilar atelectasis and left upper lobe atelectasis. Electronically Signed: Sam Mckee MD  8/11/2024 10:46 PM EDT  Workstation ID: [id number]    XR Chest 1 View    Result Date: 8/10/2024  Impression: Removal of NG/OG tube and midline thoracostomy tube, with otherwise stable medical support devices. No definite pneumothorax. Similar retrocardiac and left base opacities likely reflecting combination of small pleural fluid and atelectasis. Electronically Signed: Fam Tran MD  8/10/2024 3:33 PM EDT  Workstation ID: [id number]    XR Chest 1 View    Result Date: 8/10/2024  Impression: 1.Tubes and lines appear in similar positions. 2.Increased left basilar opacities which could be related to atelectasis or infiltrate. Small left effusion is not excluded. Electronically Signed: Bobo Fried  8/10/2024 9:00 AM EDT  Workstation ID: [id number]    XR Chest 1 View    Result Date: 8/9/2024  Impression: 1. Support devices appear in expected position. 2. Left basilar airspace opacity, favor atelectasis. Electronically Signed: Dl Epstein  8/9/2024 7:09 PM EDT  Workstation ID: [id number]      Results for orders placed during the hospital encounter of 08/07/24    Duplex Carotid Ultrasound CAR    Interpretation Summary    Right internal carotid artery demonstrates greater than 70% stenosis but less than near occlusion.    Left internal carotid artery demonstrates greater than 70% stenosis but less than near occlusion.      Assessment / Plan    Preet Edwards is a 68 y.o. male with a past medical history of ischemic cardiomyopathy, CAD s/p CABG x 3, NSTEMI, severe aortic stenosis s/p AVR, hyperlipidemia, COPD, GERD,  bilateral carotid artery disease and former smoker. He underwent CABG x 3 with EVH of the right greater saphenous vein and AVR (# 23 bovine Tapia valve) w/ Dr. Gibson on 8/9/24. He presents today in office for his first postoperative follow-up.  He does report some mild dyspnea with exertion.  Denies any chest pain, leg swelling, or clicking/popping sensations to his sternum. States overall has been doing well at home.  His wife is present with him today.    Assessment / Plan:  1. CAD/severe aortic stenosis s/p CABG x 3, AVR w/ Dr. Gibson  No surgical concerns  Reviewed sternal precautions  Patient and wife concerned about area distal to his left EVH site incision (see picture above for reference). Dr. Gibson present in room to evaluate. Most likely due to fluid collection or old hematoma. Informed patient to monitor and reassured it would resolve over time.  He and his wife have questions about his cardiology medications.  Encouraged to talk to his primary cardiologist regarding medications.  Follow-up appointment scheduled with Dr. Calix on 9/30.    2. Bilateral carotid artery stenosis (>70%)  Denies TIA/strokelike symptoms or amaurosis fugax  Plans for CTA neck  Continue ASA, Plavix, Statin  F/u in office after CTA neck performed    Patient Education:  You may resume driving at 6 weeks from your surgery date.  Please plan to stop every 2 hours to ambulate if driving or flying long distances.  No lifting over 10 lbs for 12 weeks (3 months).  After this time you can slowly increase your weight as tolerated.  You should not partake in any overhead activities or movements that involve twisting or jarring of your upper chest and torso such as (but not limited to) -- golfing, tennis, lawn mowing including zero turn mowers, use of lawn trimmers or edgers, shoveling, painting, vacuuming, mopping, sweeping, shooting a gun, etc.  Continue to keep your incisions clean and dry.  Use plain antibacterial soap (i.e. dial)  and water to clean and pat dry.    Do no apply any lotions, creams, oils, powders, salves, or ointments directly to incisional sites.  Please watch for signs and symptoms of infection which may include but are not limited to: increased pain or tenderness around your incision, warmth at the site or fever, redness or red streaking, and foul smelling or appearing drainage.  If your incision opens up please contact our office.  SBE Education/Valve Education: Symptoms of acute IE usually begin with fever (102°-104°), chills, fast heart rate, fatigue, night sweats, aching joints and muscles, persistent cough, or swelling in the feet, legs or abdomen. You can reduce the risk of IE by maintaining good oral health through regular professional dental care and the use of dental products such as manual, powered and ultrasonic toothbrushes; dental floss; and other plaque-removal devices. Inform your dentist, family doctor (PCP) or other health care professional prior to any surgeries and/or procedures that you have had heart valve surgery.  Prophylactic antibiotics are always recommended prior to any surgical procedures following heart valve replacement.    Follow Up:   4-6 weeks to review CTA neck and schedule for possible CEA pending results    Or sooner for any further concerns or worsening sign and symptoms. If unable to reach us in the office please dial 911 or go to the nearest emergency department.      DESEAN Mast  [facility] Cardiothoracic Surgery    Time Spent: I spent 31 minutes caring for Preet on this date of service. This time includes time spent by me in the following activities: preparing for the visit, obtaining and/or reviewing a separately obtained history, performing a medically appropriate examination and/or evaluation, counseling and educating the patient/family/caregiver, ordering medications, tests, or procedures, referring and communicating with other health care professionals,  documenting information in the medical record, and independently interpreting results and communicating that information with the patient/family/caregiver.

## 2024-08-29 ENCOUNTER — OFFICE VISIT (OUTPATIENT)
Dept: CARDIAC SURGERY | Facility: CLINIC | Age: 69
End: 2024-08-29
Payer: COMMERCIAL

## 2024-08-29 ENCOUNTER — TELEPHONE (OUTPATIENT)
Dept: CARDIOLOGY | Facility: CLINIC | Age: 69
End: 2024-08-29
Payer: COMMERCIAL

## 2024-08-29 VITALS
SYSTOLIC BLOOD PRESSURE: 113 MMHG | OXYGEN SATURATION: 98 % | HEIGHT: 68 IN | TEMPERATURE: 97.1 F | DIASTOLIC BLOOD PRESSURE: 52 MMHG | BODY MASS INDEX: 24.86 KG/M2 | WEIGHT: 164 LBS | HEART RATE: 80 BPM

## 2024-08-29 DIAGNOSIS — I25.10 CORONARY ARTERY DISEASE INVOLVING NATIVE HEART, UNSPECIFIED VESSEL OR LESION TYPE, UNSPECIFIED WHETHER ANGINA PRESENT: Primary | ICD-10-CM

## 2024-08-29 DIAGNOSIS — I65.23 BILATERAL CAROTID ARTERY STENOSIS: ICD-10-CM

## 2024-08-29 PROCEDURE — 99024 POSTOP FOLLOW-UP VISIT: CPT

## 2024-08-29 RX ORDER — LEVALBUTEROL INHALATION SOLUTION 0.63 MG/3ML
1 SOLUTION RESPIRATORY (INHALATION) EVERY 6 HOURS PRN
COMMUNITY
Start: 2024-08-28

## 2024-08-29 RX ORDER — LEVOFLOXACIN 500 MG/1
500 TABLET, FILM COATED ORAL DAILY
COMMUNITY
Start: 2024-08-28

## 2024-08-29 RX ORDER — UMECLIDINIUM BROMIDE AND VILANTEROL TRIFENATATE 62.5; 25 UG/1; UG/1
1 POWDER RESPIRATORY (INHALATION)
COMMUNITY
Start: 2024-08-28

## 2024-08-29 NOTE — TELEPHONE ENCOUNTER
Wife called to report patient has been having issues with low blood pressure, so they have cut entresto dose and his BP seems to be doing much better.  Advised to keep a log/record and bring to upcoming appt with heart and valve for potential changes to med's. Patient has an appt on Wednesday Sept 4, 2024.  Understanding verbalized.

## 2024-09-03 ENCOUNTER — TELEPHONE (OUTPATIENT)
Dept: CARDIAC SURGERY | Facility: CLINIC | Age: 69
End: 2024-09-03
Payer: COMMERCIAL

## 2024-09-03 DIAGNOSIS — I65.23 BILATERAL CAROTID ARTERY STENOSIS: Primary | ICD-10-CM

## 2024-09-03 NOTE — TELEPHONE ENCOUNTER
Spoke to pts wife per  verbal - pt did undergo CABG 8/9 with Dr. Gibson.  He is going to be set up for CTA carotids and WILL follow up in clinic with Dr. Pisano as he will be his surgeon for carotids.  He will follow in clinic for post op and follow up appts with our APRNs.    I also reminded them we need appt for CTA to be prior to appt in clinic.  Appt rescheduled from 9/30 @ 9:00 as this time is not available to 9/23 @ 7:15am and explained that only early early am appts available.

## 2024-09-03 NOTE — TELEPHONE ENCOUNTER
Caller: LARRY HASSAN    Relationship: Emergency Contact    Best call back number:     [phone]       What is the best time to reach you: ANY     Who are you requesting to speak with (clinical staff, provider,  specific staff member): ANY    What was the call regarding: PATIENT WOULD LIKE TO KNOW IF THEY WILL CONTINUE TO SEE GEE LOPEZ OR DR. VO.     PATIENT WOULD ALSO LIKE AN UPDATE ON SCHEDULING A CT.     Is it okay if the provider responds through [facility]: PATIENTS WIFE LARRY WOULD LIKE A CALL BACK.

## 2024-09-04 ENCOUNTER — OFFICE VISIT (OUTPATIENT)
Dept: CARDIOLOGY | Facility: HOSPITAL | Age: 69
End: 2024-09-04
Payer: COMMERCIAL

## 2024-09-04 VITALS
HEIGHT: 68 IN | TEMPERATURE: 97.4 F | BODY MASS INDEX: 24.6 KG/M2 | HEART RATE: 87 BPM | DIASTOLIC BLOOD PRESSURE: 69 MMHG | WEIGHT: 162.31 LBS | RESPIRATION RATE: 20 BRPM | SYSTOLIC BLOOD PRESSURE: 121 MMHG | OXYGEN SATURATION: 100 %

## 2024-09-04 DIAGNOSIS — I25.10 CORONARY ARTERY DISEASE INVOLVING NATIVE CORONARY ARTERY OF NATIVE HEART WITHOUT ANGINA PECTORIS: ICD-10-CM

## 2024-09-04 DIAGNOSIS — I50.22 CHRONIC HFREF (HEART FAILURE WITH REDUCED EJECTION FRACTION): Primary | ICD-10-CM

## 2024-09-04 DIAGNOSIS — E78.5 DYSLIPIDEMIA: Chronic | ICD-10-CM

## 2024-09-04 PROCEDURE — 99214 OFFICE O/P EST MOD 30 MIN: CPT | Performed by: NURSE PRACTITIONER

## 2024-09-10 NOTE — PROGRESS NOTES
"Chief Complaint  Cardiomyopathy and Follow-up    Subjective    History of Present Illness {  Problem List  Visit  Diagnosis   Encounters  Notes  Medications  Labs  Result Review Imaging  Media :23}       History of Present Illness   68-year-old male presents the office today for ongoing evaluation of his CAD and chronic HFrEF.  Echo 8/15/2024 showed EF of 31 to 35% with hypokinesis of the apical anterior, apical lateral and apex.patient presented initially to outlying facility and was found to have ST elevation, and positive troponins in addition to worsening dyspnea. Patient was transferred to [facility] on 8/7 for cardiology evaluation. Patient was taken for emergent left heart catheterization which showed three-vessel CAD. Patient was then recommended to undergo surgical evaluation for multivessel CAD, and AVR. On 8/9, patient underwent CABG x 3 with aortic valve replacement, and EVH. Carotid ultrasound revealed greater than 70% stenosis of right and left ICA. Echocardiogram showed reduced ejection fraction of 35%. He ultimately met discharge criteria on postop day #6 and was discharged on 8/16.   Patient presents with home heart rate and blood pressure log showing heart rates in the 80s.  Blood pressure has been occasionally in the 90s systolic but more recently 106/50 to 135/60.Patient's wife reports that they stopped Entresto due to low blood pressure readings.  Patient notes when blood pressure readings are lower he does experience dizziness and weakness.  Currently denies cp, dyspnea, dizziness, pedal edema.   Objective     Vital Signs:   Vitals:    09/04/24 1459   BP: 121/69   BP Location: Left arm   Patient Position: Sitting   Cuff Size: Adult   Pulse: 87   Resp: 20   Temp: 97.4 °F (36.3 °C)   TempSrc: Temporal   SpO2: 100%   Weight: 73.6 kg (162 lb 5 oz)   Height: 172.7 cm (68\")     Body mass index is 24.68 kg/m².  Physical Exam  Vitals and nursing note reviewed. "   Constitutional:       Appearance: Normal appearance.   HENT:      Head: Normocephalic.   Eyes:      Pupils: Pupils are equal, round, and reactive to light.   Cardiovascular:      Rate and Rhythm: Normal rate and regular rhythm.      Pulses: Normal pulses.      Heart sounds: Normal heart sounds. No murmur heard.  Pulmonary:      Effort: Pulmonary effort is normal.      Breath sounds: Normal breath sounds.   Abdominal:      General: Bowel sounds are normal.      Palpations: Abdomen is soft.   Musculoskeletal:         General: Normal range of motion.      Cervical back: Normal range of motion.      Right lower leg: No edema.      Left lower leg: No edema.   Skin:     General: Skin is warm and dry.      Capillary Refill: Capillary refill takes less than 2 seconds.      Comments: Midsternal incision well healed   Bilateral evh sites healed    Neurological:      Mental Status: He is alert and oriented to person, place, and time.   Psychiatric:         Mood and Affect: Mood normal.         Thought Content: Thought content normal.              Result Review  Data Reviewed:{ Labs  Result Review  Imaging  Med Tab  Media :23}   Cardiac Catheterization/Vascular Study (08/07/2024 19:56)   Adult Transthoracic Echo Complete W/ Cont if Necessary Per Protocol (08/08/2024 15:18)  Duplex Carotid Ultrasound CAR (08/08/2024 11:26)  Adult Transthoracic Echo Limited W/ Cont if Necessary Per Protocol (08/15/2024 13:59)     Basic Metabolic Panel (08/14/2024 04:25)  CBC & Differential (08/14/2024 04:25)     Assessment and Plan {CC Problem List  Visit Diagnosis  ROS  Review (Popup)  Health Maintenance  Quality  BestPractice  Medications  SmartSets  SnapShot Encounters  Media :23}   1. Chronic HFrEF (heart failure with reduced ejection fraction)  Continue carvedilol, Jardiance  and will decrease Lasix to half tablet daily  Will resume Entresto when blood pressure allows  Heart failure education today including signs and  symptoms, the role of the heart failure center, daily weights, low sodium diet (less than 1500 mg per day), and daily physical activity. Reviewed HF Zones with patient and family.  Patient to continue current medications as previously ordered.   2. Coronary artery disease involving native coronary artery of native heart without angina pectoris  S/p cabg   Stable on asa, coreg, plavix, crestor   3. Dyslipidemia  Stable on crestor           Follow Up {Instructions Charge Capture  Follow-up Communications :23}   Return in about 6 weeks (around 10/14/2024) for Office visit, HF.    Patient was given instructions and counseling regarding his condition or for health maintenance advice. Please see specific information pulled into the AVS if appropriate.  Patient was instructed to call the [facility] with any questions, concerns, or worsening symptoms.

## 2024-09-13 ENCOUNTER — TELEPHONE (OUTPATIENT)
Dept: CARDIOLOGY | Facility: HOSPITAL | Age: 69
End: 2024-09-13
Payer: COMMERCIAL

## 2024-09-13 NOTE — TELEPHONE ENCOUNTER
Spoke with patient's wife who notes that his bp had been doing well until yesterday. Bp during pt yesterday was 80s and patient had been symptomatic. BP had been running prior to yesterday 107-115  Continue monitoring bp

## 2024-09-18 ENCOUNTER — HOSPITAL ENCOUNTER (OUTPATIENT)
Dept: CT IMAGING | Facility: HOSPITAL | Age: 69
Discharge: HOME | End: 2024-09-18
Admitting: NURSE PRACTITIONER
Payer: COMMERCIAL

## 2024-09-18 DIAGNOSIS — I65.23 BILATERAL CAROTID ARTERY STENOSIS: ICD-10-CM

## 2024-09-18 PROCEDURE — 70498 CT ANGIOGRAPHY NECK: CPT

## 2024-09-18 PROCEDURE — 25510000001 IOPAMIDOL PER 1 ML: Performed by: NURSE PRACTITIONER

## 2024-09-18 RX ORDER — IOPAMIDOL 755 MG/ML
75 INJECTION, SOLUTION INTRAVASCULAR
Status: COMPLETED | OUTPATIENT
Start: 2024-09-18 | End: 2024-09-18

## 2024-09-18 RX ADMIN — IOPAMIDOL 75 ML: 755 INJECTION, SOLUTION INTRAVENOUS at 08:40

## 2024-09-20 ENCOUNTER — TELEPHONE (OUTPATIENT)
Dept: CARDIOLOGY | Facility: HOSPITAL | Age: 69
End: 2024-09-20
Payer: COMMERCIAL

## 2024-09-20 NOTE — H&P (VIEW-ONLY)
Work     [facility] Cardiothoracic Surgery Office Follow Up Note     Date of Encounter: 2024     Name: Preet Edwards  : 1955     Referred By: No ref. provider found  PCP: Edgard Romero MD    Chief Complaint:    Chief Complaint   Patient presents with    Carotid Artery Disease     Follow-up after CTA carotids. S/P CABGx3/ AVR 24.       Subjective      History of Present Illness:    Preet Edwards is a 68 y.o. male s/p CABG with AVR per Dr. Gibson 2024 following up for bilateral SEPIDEH noted to be greater than 70% per pre-op carotid duplex.  He follows up today with CTA neck imaging.  PMH: Ischemic cardiomyopathy, CAD s/p CABG x 3 (SVG-OM, LIMA-LAD, SVG-diagonal), severe aortic stenosis s/p AVR (23 mm Tapia Inspiris bovine tissue valve), hyperlipidemia, COPD, GERD, former smoker, and bilateral SEPIDEH.  No preoperative CVA/TIA symptoms.  SALVADOR  and LICA .  Current medical therapy includes aspirin, Plavix, beta-blocker, and statin therapy.  Last seen in clinic 2024 recovering well from surgery.  Patient states he gets dizzy when rising quickly from seated position but denies any CVA/TIA symptoms or amaurosis fugax.      Review of Systems:  Review of Systems   Constitutional: Negative for chills, decreased appetite, diaphoresis, fever, malaise/fatigue, night sweats, weight gain and weight loss.   HENT:  Negative for hoarse voice.    Eyes:  Negative for blurred vision, double vision and visual disturbance.   Cardiovascular:  Positive for dyspnea on exertion. Negative for chest pain, claudication, irregular heartbeat, leg swelling, near-syncope, orthopnea, palpitations, paroxysmal nocturnal dyspnea and syncope.   Respiratory:  Positive for cough and wheezing. Negative for hemoptysis, shortness of breath and sputum production.    Hematologic/Lymphatic: Negative for adenopathy and bleeding problem. Does not bruise/bleed easily.   Skin:  Negative for color change, nail changes, poor  wound healing and rash.   Musculoskeletal:  Negative for back pain, falls and muscle cramps.   Gastrointestinal:  Negative for abdominal pain, dysphagia and heartburn.   Genitourinary:  Negative for flank pain.   Neurological:  Positive for dizziness and numbness (area on left thigh). Negative for brief paralysis, disturbances in coordination, focal weakness, headaches, light-headedness, loss of balance, paresthesias, sensory change, vertigo and weakness.   Psychiatric/Behavioral:  Negative for depression and suicidal ideas. The patient is not nervous/anxious.    Allergic/Immunologic: Negative for persistent infections.       I have reviewed the following portions of the patient's history: problem list, current medications, allergies, past surgical history, past medical history, past social history, past family history, and ROS and confirm it's accurate.    Allergies:  No Known Allergies    Medications:      Current Outpatient Medications:     Anoro Ellipta 62.5-25 MCG/ACT aerosol powder  inhaler, Inhale 1 puff Daily., Disp: , Rfl:     aspirin 81 MG EC tablet, Take 1 tablet by mouth Daily., Disp: 90 tablet, Rfl: 1    carvedilol (COREG) 6.25 MG tablet, Take 1 tablet by mouth 2 (Two) Times a Day With Meals., Disp: 60 tablet, Rfl: 11    clopidogrel (Plavix) 75 MG tablet, Take 1 tablet by mouth Daily., Disp: 30 tablet, Rfl: 11    empagliflozin (Jardiance) 10 MG tablet tablet, Take 1 tablet by mouth Daily., Disp: 30 tablet, Rfl: 1    furosemide (LASIX) 40 MG tablet, Take 1 tablet by mouth Daily. (Patient taking differently: Take 0.5 tablets by mouth Daily.), Disp: 30 tablet, Rfl: 11    levalbuterol (XOPENEX) 0.63 MG/3ML nebulizer solution, Take 1 ampule by nebulization Every 6 (Six) Hours As Needed., Disp: , Rfl:     potassium chloride 10 MEQ CR tablet, Take 1 tablet by mouth Daily., Disp: 30 tablet, Rfl: 11    rosuvastatin (CRESTOR) 20 MG tablet, Take 1 tablet by mouth Every Night., Disp: 30 tablet, Rfl: 11     HYDROcodone-acetaminophen (Norco) 5-325 MG per tablet, Take 1 tablet by mouth Every 8 (Eight) Hours As Needed for Severe Pain. (Patient not taking: Reported on 2024), Disp: 10 tablet, Rfl: 0    HYDROcodone-acetaminophen (NORCO) 7.5-325 MG per tablet, Take 1 tablet by mouth Every 6 (Six) Hours As Needed for Moderate Pain. (Patient not taking: Reported on 2024), Disp: 25 tablet, Rfl: 0    History:   Past Medical History:   Diagnosis Date    COPD (chronic obstructive pulmonary disease)     GERD (gastroesophageal reflux disease)        Past Surgical History:   Procedure Laterality Date    CARDIAC CATHETERIZATION N/A 2024    Procedure: Left Heart Cath;  Surgeon: Logan Calix MD;  Location:  [facility];  Service: Cardiovascular;  Laterality: N/A;    CATARACT EXTRACTION      CORONARY ARTERY BYPASS GRAFT WITH AORTIC VALVE REPAIR/REPLACEMENT N/A 2024    Procedure: CORONARY ARTERY BYPASS X 3 WITH INTERNAL MAMMARY ARTERY GRAFT AND AORTIC VALVE REPAIR/REPLACEMENT, SINDY, EVH;  Surgeon: Gage Gibson MD;  Location: [facility] OR;  Service: Cardiothoracic;  Laterality: N/A;       Social History     Socioeconomic History    Marital status:     Number of children: 4   Tobacco Use    Smoking status: Former     Current packs/day: 0.00     Types: Cigarettes     Quit date:      Years since quittin.     Passive exposure: Never    Smokeless tobacco: Never   Vaping Use    Vaping status: Former   Substance and Sexual Activity    Alcohol use: Never    Drug use: Not Currently    Sexual activity: Defer        Family History   Problem Relation Age of Onset    Heart disease Mother     Heart attack Mother     Diabetes Mother     Cancer Father     Lung cancer Father     No Known Problems Sister     No Known Problems Sister     Heart attack Brother     Heart disease Brother     Heart attack Brother     Heart disease Brother        Objective   Physical Exam:  Vitals:    24 0900 24  "0901   BP: 142/60 150/60   BP Location: Right arm Left arm   Patient Position: Sitting Sitting   Pulse: 79    Temp: 97.1 °F (36.2 °C)    SpO2: 98%    Weight: 74 kg (163 lb 3.2 oz)    Height: 172.7 cm (68\")       Body mass index is 24.81 kg/m².    Physical Exam  Vitals reviewed.   Constitutional:       General: He is not in acute distress.     Appearance: He is not toxic-appearing.   HENT:      Head: Normocephalic and atraumatic.   Eyes:      General: Lids are normal.      Conjunctiva/sclera: Conjunctivae normal.      Pupils: Pupils are equal, round, and reactive to light.   Neck:      Vascular: Carotid bruit present.      Comments: Bilateral carotid bruit  Cardiovascular:      Rate and Rhythm: Normal rate and regular rhythm.      Pulses:           Radial pulses are 2+ on the right side and 2+ on the left side.      Heart sounds: S1 normal and S2 normal. No murmur heard.  Pulmonary:      Effort: Pulmonary effort is normal. No respiratory distress.      Breath sounds: Normal breath sounds.      Comments: Well healed sternotomy incision and MT sites without sternal instability  Musculoskeletal:         General: Normal range of motion.      Cervical back: Normal range of motion and neck supple.      Right lower leg: No edema.      Left lower leg: No edema.   Skin:     General: Skin is warm and dry.      Capillary Refill: Capillary refill takes less than 2 seconds.   Neurological:      General: No focal deficit present.      Mental Status: He is alert and oriented to person, place, and time.      GCS: GCS eye subscore is 4. GCS verbal subscore is 5. GCS motor subscore is 6.      Cranial Nerves: Cranial nerves 2-12 are intact.   Psychiatric:         Attention and Perception: Attention normal.         Mood and Affect: Mood normal.         Speech: Speech normal.         Behavior: Behavior is cooperative.         Imaging/Labs:  CT Angiogram Carotids: 9/18/2024 (Personally reviewed w/ Dr. Pisano.  Agree w/ 90% SALVADOR stenosis " above the bifurcation.  LICA stenosis appears in the range of 70-80%)  Impression:   1.Atherosclerotic plaque along right carotid bifurcation resulting in focal 90% stenosis along proximal right ICA.   2.Atherosclerotic plaque along left carotid bifurcation resulting in focal 55% stenosis along proximal left ICA.   Electronically Signed: Bart Vides MD  9/18/2024 9:46 AM       TTE 8/15/2024 Interpretation Summary     Left ventricular systolic function is moderately decreased. Estimated left ventricular EF = 35% Left ventricular ejection fraction appears to be 31 - 35%.    The following left ventricular wall segments are hypokinetic: apical anterior, apical lateral and apex hypokinetic.    Duplex Carotid Ultrasound 8/7/2024  Interpretation Summary    Right internal carotid artery demonstrates greater than 70% stenosis but less than near occlusion.    Left internal carotid artery demonstrates greater than 70% stenosis but less than near occlusion.    Elevated velocities 531.67/265.84 cm/sec detected in the right ICA with a ratio of 10.81.  Elevated velocities 397.47/176.13 cm/sec detected in the left ICA with a ratio of 6.13.  Vertebral arteries are antegrade, bilaterally.   No prior exam for comparison.         Assessment / Plan      Assessment / Plan:  1. Bilateral carotid artery stenosis (>70%)   2. Coronary artery disease s/p CABG 8/9/2024  3.  Severe AS s/p AVR  - 68 y.o. male s/p CABG with AVR per Dr. Gibson 8/9/2024 following up for bilateral SEPIDEH noted to be greater than 70% per pre-op carotid duplex.    - CTA neck imaging reviewed w/ Dr. Pisano who agrees SALVADOR stenosis is 90% and reads the left ICA stenosis 70-80%.  - Staged bilateral CEA is recommended/ right first then left later.    - PMH: Ischemic cardiomyopathy, CAD s/p CABG x 3 (SVG-OM, LIMA-LAD, SVG-diagonal), severe aortic stenosis s/p AVR (23 mm Tapia Inspiris bovine tissue valve), hyperlipidemia, COPD, GERD, former smoker, and bilateral SEPIDEH.    -  No current CVA/TIA symptoms.    - Current medical therapy includes aspirin, Plavix, beta-blocker, and statin therapy.    - Recovering well from CABG/AVR surgery.    - Reviewed procedure details, risks, and post op expectations for right CEA/EEG per Dr. Pisano.  Risks reviewed include death, MI, CVA, bleeding, pain, infection, dysphonia, dysphagia, and blood clots.  Mr. Edwards verbalized understanding of these risks and wishes to proceed with scheduling (Dr. Pisano recommends scheduling 2-3 weeks out to give adequate time to recover from CABG)  - Patient will follow up in clinic 2-4 weeks post op right CEA      Patient Education: see surgical risk discussion above      Follow Up:   Return in about 4 weeks (around 10/21/2024) for Post op right CEA/EEGper Dr. Pisano.   Or sooner for any further concerns or worsening sign and symptoms. If unable to reach us in the office please dial 911 or go to the nearest emergency department.      DESEAN Calzada  [facility] Cardiothoracic Surgery    Time Spent: I spent 47 minutes caring for Preet on this date of service. This time includes time spent by me in the following activities: preparing for the visit, reviewing tests, obtaining and/or reviewing a separately obtained history, performing a medically appropriate examination and/or evaluation, counseling and educating the patient/family/caregiver, ordering medications, tests, or procedures, referring and communicating with other health care professionals, documenting information in the medical record, independently interpreting results and communicating that information with the patient/family/caregiver, and care coordination.

## 2024-09-23 ENCOUNTER — OFFICE VISIT (OUTPATIENT)
Dept: CARDIAC SURGERY | Facility: CLINIC | Age: 69
End: 2024-09-23
Payer: COMMERCIAL

## 2024-09-23 VITALS
HEART RATE: 79 BPM | BODY MASS INDEX: 24.74 KG/M2 | WEIGHT: 163.2 LBS | OXYGEN SATURATION: 98 % | DIASTOLIC BLOOD PRESSURE: 60 MMHG | HEIGHT: 68 IN | SYSTOLIC BLOOD PRESSURE: 150 MMHG | TEMPERATURE: 97.1 F

## 2024-09-23 DIAGNOSIS — I35.0 SEVERE AORTIC STENOSIS: ICD-10-CM

## 2024-09-23 DIAGNOSIS — I25.10 CORONARY ARTERY DISEASE INVOLVING NATIVE CORONARY ARTERY OF NATIVE HEART WITHOUT ANGINA PECTORIS: ICD-10-CM

## 2024-09-23 DIAGNOSIS — I65.23 BILATERAL CAROTID ARTERY STENOSIS: Primary | ICD-10-CM

## 2024-09-23 PROCEDURE — 99215 OFFICE O/P EST HI 40 MIN: CPT | Performed by: NURSE PRACTITIONER

## 2024-09-25 ENCOUNTER — PREP FOR SURGERY (OUTPATIENT)
Dept: OTHER | Facility: HOSPITAL | Age: 69
End: 2024-09-25
Payer: COMMERCIAL

## 2024-09-25 DIAGNOSIS — I65.23 BILATERAL CAROTID ARTERY STENOSIS: Primary | ICD-10-CM

## 2024-09-26 RX ORDER — CHLORHEXIDINE GLUCONATE 500 MG/1
1 CLOTH TOPICAL EVERY 12 HOURS PRN
OUTPATIENT
Start: 2024-09-30

## 2024-09-29 PROBLEM — I10 ESSENTIAL HYPERTENSION: Status: ACTIVE | Noted: 2024-09-29

## 2024-09-30 ENCOUNTER — HOSPITAL ENCOUNTER (OUTPATIENT)
Dept: GENERAL RADIOLOGY | Facility: HOSPITAL | Age: 69
Discharge: HOME | End: 2024-09-30
Payer: COMMERCIAL

## 2024-09-30 ENCOUNTER — PRE-ADMISSION TESTING (OUTPATIENT)
Dept: PREADMISSION TESTING | Facility: HOSPITAL | Age: 69
End: 2024-09-30
Payer: COMMERCIAL

## 2024-09-30 ENCOUNTER — OFFICE VISIT (OUTPATIENT)
Dept: CARDIOLOGY | Facility: CLINIC | Age: 69
End: 2024-09-30
Payer: COMMERCIAL

## 2024-09-30 VITALS
OXYGEN SATURATION: 97 % | WEIGHT: 162 LBS | DIASTOLIC BLOOD PRESSURE: 56 MMHG | HEART RATE: 69 BPM | BODY MASS INDEX: 24.55 KG/M2 | SYSTOLIC BLOOD PRESSURE: 138 MMHG | HEIGHT: 68 IN

## 2024-09-30 VITALS — WEIGHT: 165.24 LBS | HEIGHT: 68 IN | BODY MASS INDEX: 25.04 KG/M2

## 2024-09-30 DIAGNOSIS — E78.5 DYSLIPIDEMIA: Chronic | ICD-10-CM

## 2024-09-30 DIAGNOSIS — I65.23 BILATERAL CAROTID ARTERY STENOSIS: ICD-10-CM

## 2024-09-30 DIAGNOSIS — I10 ESSENTIAL HYPERTENSION: ICD-10-CM

## 2024-09-30 DIAGNOSIS — I25.810 CORONARY ARTERY DISEASE INVOLVING CORONARY BYPASS GRAFT OF NATIVE HEART WITHOUT ANGINA PECTORIS: Primary | ICD-10-CM

## 2024-09-30 LAB
ABO GROUP BLD: NORMAL
ANION GAP SERPL CALCULATED.3IONS-SCNC: 12 MMOL/L (ref 5–15)
BLD GP AB SCN SERPL QL: NEGATIVE
BUN SERPL-MCNC: 37 MG/DL (ref 8–23)
BUN/CREAT SERPL: 20.9 (ref 7–25)
CALCIUM SPEC-SCNC: 9.6 MG/DL (ref 8.6–10.5)
CHLORIDE SERPL-SCNC: 100 MMOL/L (ref 98–107)
CO2 SERPL-SCNC: 30 MMOL/L (ref 22–29)
CREAT SERPL-MCNC: 1.77 MG/DL (ref 0.76–1.27)
DEPRECATED RDW RBC AUTO: 44.5 FL (ref 37–54)
EGFRCR SERPLBLD CKD-EPI 2021: 41.8 ML/MIN/1.73
ERYTHROCYTE [DISTWIDTH] IN BLOOD BY AUTOMATED COUNT: 13.7 % (ref 12.3–15.4)
GLUCOSE SERPL-MCNC: 139 MG/DL (ref 65–99)
HBA1C MFR BLD: 5.7 % (ref 4.8–5.6)
HCT VFR BLD AUTO: 37.4 % (ref 37.5–51)
HGB BLD-MCNC: 12.1 G/DL (ref 13–17.7)
INR PPP: 1.08 (ref 0.89–1.12)
MCH RBC QN AUTO: 29 PG (ref 26.6–33)
MCHC RBC AUTO-ENTMCNC: 32.4 G/DL (ref 31.5–35.7)
MCV RBC AUTO: 89.7 FL (ref 79–97)
PLATELET # BLD AUTO: 221 10*3/MM3 (ref 140–450)
PMV BLD AUTO: 10.9 FL (ref 6–12)
POTASSIUM SERPL-SCNC: 4.4 MMOL/L (ref 3.5–5.2)
PROTHROMBIN TIME: 14.1 SECONDS (ref 12.2–14.5)
RBC # BLD AUTO: 4.17 10*6/MM3 (ref 4.14–5.8)
RH BLD: POSITIVE
SODIUM SERPL-SCNC: 142 MMOL/L (ref 136–145)
T&S EXPIRATION DATE: NORMAL
WBC NRBC COR # BLD AUTO: 8.88 10*3/MM3 (ref 3.4–10.8)

## 2024-09-30 PROCEDURE — 71046 X-RAY EXAM CHEST 2 VIEWS: CPT

## 2024-09-30 PROCEDURE — 86850 RBC ANTIBODY SCREEN: CPT

## 2024-09-30 PROCEDURE — 85610 PROTHROMBIN TIME: CPT

## 2024-09-30 PROCEDURE — 99214 OFFICE O/P EST MOD 30 MIN: CPT | Performed by: INTERNAL MEDICINE

## 2024-09-30 PROCEDURE — 36415 COLL VENOUS BLD VENIPUNCTURE: CPT

## 2024-09-30 PROCEDURE — 86901 BLOOD TYPING SEROLOGIC RH(D): CPT

## 2024-09-30 PROCEDURE — 93000 ELECTROCARDIOGRAM COMPLETE: CPT | Performed by: INTERNAL MEDICINE

## 2024-09-30 PROCEDURE — 86900 BLOOD TYPING SEROLOGIC ABO: CPT

## 2024-09-30 PROCEDURE — 85027 COMPLETE CBC AUTOMATED: CPT

## 2024-09-30 PROCEDURE — 80048 BASIC METABOLIC PNL TOTAL CA: CPT

## 2024-09-30 PROCEDURE — 83036 HEMOGLOBIN GLYCOSYLATED A1C: CPT

## 2024-09-30 RX ORDER — FAMOTIDINE 20 MG/1
1 TABLET, FILM COATED ORAL EVERY 12 HOURS SCHEDULED
COMMUNITY
Start: 2024-09-25

## 2024-09-30 NOTE — PROGRESS NOTES
[facility] Cardiology  Subjective:     Encounter Date: 09/30/2024      Patient ID: Preet Edwards is a 68 y.o. male.    Chief Complaint: Chronic HFrEF (heart failure with reduced ejection fraction      PROBLEM LIST:  CAD  [facility], 08/07/2024: EF 30%.Multivessel coronary artery disease. 90% ulcerated proximal LAD stenosis, involves origin of moderate-sized diagonal. 80% proximal left circumflex stenosis. Small subtotally occluded OM1. Chronically occluded proximal RCA with 3+ collaterals to the large right PDA. Likely critical aortic stenosis with peak to peak gradient of 46 mmHg. In the setting of severe LV dysfunction this is likely critical aortic stenosis.  Echo, 08/08/2024: EF 35%. The following left ventricular wall segments are hypokinetic: mid anterior, apical anterior, apical lateral, apical septal and apex hypokinetic. Severe AV stenosis. Aortic valve maximum pressure gradient is 67 mmHg. Aortic valve mean pressure gradient is 42 mmHg.   CABG x3, 08/09/2024: Left Anterior Descending Artery, SVG to Diagonal and Obtuse Marginal AND AORTIC VALVE REPAIR/REPLACEMENT, 23mm Bovine Tapia valve   Echo, 08/15/2024: EF 35%. The following left ventricular wall segments are hypokinetic: apical anterior, apical lateral and apex hypokinetic.   Carotid artery disease   Carotid duplex, 08/08/2024: Bilateral ICA >70% stenosis.  hypertension  Hyperlipidemia  COPD  Remote tobacco use      History of Present Illness  Preet Edwards returns today for a hospital follow up with a history of CAD and cardiac risk factors. Since last visit, patient is doing well postoperatively.  He is scheduled for CEA tomorrow.  His usual musculoskeletal post CABG pain.  Increasing functional capacity.  Did have the Entresto discontinued due to orthostasis which is now resolved.  No orthopnea PND    No Known Allergies      Current Outpatient Medications:     Anoro Ellipta 62.5-25 MCG/ACT aerosol powder   "inhaler, Inhale 1 puff As Needed., Disp: , Rfl:     aspirin 81 MG EC tablet, Take 1 tablet by mouth Daily., Disp: 90 tablet, Rfl: 1    carvedilol (COREG) 6.25 MG tablet, Take 1 tablet by mouth 2 (Two) Times a Day With Meals., Disp: 60 tablet, Rfl: 11    empagliflozin (Jardiance) 10 MG tablet tablet, Take 1 tablet by mouth Daily., Disp: 30 tablet, Rfl: 1    famotidine (PEPCID) 20 MG tablet, Take 1 tablet by mouth Every 12 (Twelve) Hours., Disp: , Rfl:     levalbuterol (XOPENEX) 0.63 MG/3ML nebulizer solution, Take 1 ampule by nebulization Every 6 (Six) Hours As Needed., Disp: , Rfl:     potassium chloride 10 MEQ CR tablet, Take 1 tablet by mouth Daily., Disp: 30 tablet, Rfl: 11    rosuvastatin (CRESTOR) 20 MG tablet, Take 1 tablet by mouth Every Night., Disp: 30 tablet, Rfl: 11    The following portions of the patient's history were reviewed and updated as appropriate: allergies, current medications, past family history, past medical history, past social history, past surgical history and problem list.    ROS       Objective:     Vitals:    09/30/24 1259   BP: 138/56   BP Location: Right arm   Patient Position: Sitting   Cuff Size: Adult   Pulse: 69   SpO2: 97%   Weight: 73.5 kg (162 lb)   Height: 172.7 cm (67.99\")         Vitals reviewed.   Constitutional:       Appearance: Well-developed and not in distress.   Neck:      Thyroid: No thyromegaly.      Vascular: No carotid bruit or JVD.   Pulmonary:      Breath sounds: Normal breath sounds.   Chest:      Comments: Well-healed sternotomy scar.  Cardiovascular:      Regular rhythm.      No gallop. No S3 and S4 gallop.   Pulses:     Intact distal pulses.      Carotid: 2+ bilaterally.     Radial: 2+ bilaterally.  Edema:     Peripheral edema absent.   Abdominal:      General: Bowel sounds are normal.      Palpations: Abdomen is soft. There is no abdominal mass.      Tenderness: There is no abdominal tenderness.   Musculoskeletal:         General: No deformity.      " Extremities: No clubbing present.Skin:     General: Skin is warm and dry.      Findings: No rash.   Neurological:      Mental Status: Alert and oriented to person, place, and time.         Lab Review:  Lab Results   Component Value Date    GLUCOSE 139 (H) 09/30/2024    BUN 37 (H) 09/30/2024    CREATININE 1.77 (H) 09/30/2024    BCR 20.9 09/30/2024    K 4.4 09/30/2024    CO2 30.0 (H) 09/30/2024    CALCIUM 9.6 09/30/2024    ALBUMIN 4.3 08/10/2024    ALKPHOS 22 (L) 08/10/2024    AST 36 08/10/2024    ALT 14 08/10/2024     Lab Results   Component Value Date    CHOL 80 08/09/2024    TRIG 72 08/09/2024    HDL 15 (L) 08/09/2024    LDL 49 08/09/2024      Lab Results   Component Value Date    WBC 8.88 09/30/2024    RBC 4.17 09/30/2024    HGB 12.1 (L) 09/30/2024    HCT 37.4 (L) 09/30/2024    MCV 89.7 09/30/2024     09/30/2024     Lab Results   Component Value Date    TSH 4.230 (H) 08/08/2024     Lab Results   Component Value Date    HGBA1C 5.70 (H) 09/30/2024          ECG 12 Lead    Date/Time: 9/30/2024 5:03 PM  Performed by: Logan Calix MD    Authorized by: Hector Castillo, PA  Comparison: compared with previous ECG from 8/10/2024  Similar to previous ECG  Rhythm: sinus rhythm  Comments: Lateral ST segment changes            Advance Care Planning   ACP discussion was held with the patient during this visit. Patient does not have an advance directive, declines further assistance.           Assessment:   Diagnoses and all orders for this visit:    1. Coronary artery disease involving coronary bypass graft of native heart without angina pectoris (Primary)    2. Essential hypertension    3. Dyslipidemia        Impression:  1. Coronary artery disease.  2 months post CABG.  Stable    2. Essential hypertension.  Well-controlled on beta-blocker    3. Dyslipidemia.  On high intensity statin    4.  Carotid artery disease preop CEA    5.  Cardiomyopathy LVEF 35%.  Off Entresto due to hypotension immediately  postop    Plan:  Stable cardiac status.   Low risk of upcoming CEA  Continue current medications.  Post CEA will retry to initiate Entresto for cardiomyopathy.  Revisit in 2 MO, or sooner as needed.          Logan Calix MD      Please note that portions of this note may have been completed with a voice recognition program. Efforts were made to edit the dictations, but occasionally words are mistranscribed.

## 2024-09-30 NOTE — PAT
Patient to apply Chlorhexadine wipes  to surgical area (as instructed) the night before procedure and the AM of procedure. Wipes provided.    Patient viewed general PAT education video as instructed in their preoperative information received from their surgeon.  Patient stated the general PAT education video was viewed in its entirety and survey completed.  [facility]ies [facility] general education handouts (Incentive Spirometry, Meds to Beds Program, Patient Belongings, Pre-op skin preparation instructions, Blood Glucose testing, Visitor policy, Surgery FAQ, Code H) distributed to patient if not printed. Education related to the PAT pass and skin preparation for surgery (if applicable) completed in PAT as a reinforcement to PAT education video. Patient instructed to return PAT pass provided today as well as completed skin preparation sheet (if applicable) on the day of procedure.     Additionally if patient had not viewed video yet but intended to view it at home or in our waiting area, then referred them to the handout with QR code/link provided during PAT visit.  Encouraged patient/family to read PAT general education handouts thoroughly and notify PAT staff with any questions or concerns. Patient verbalized understanding of all information and priority content.    Blood bank bracelet applied to patient during Pre Admission Testing visit.  Patient instructed not to remove from arm until after procedure and they are discharged from the hospital.  Explained to patient that they may shower and get the bracelet wet, but not to immerse under water for longer periods (bathing, swimming, hand dishwashing, etc).  Patient verbalized understanding.  \  Patient directed to Radiology Department for CXR after Pre Admission Testing Appointment.

## 2024-09-30 NOTE — DISCHARGE INSTRUCTIONS
Pt alert and visible on the unit  Pleasant on approach  Stated, "I am here because I exercise too much"  Reported depression 2/10 and anxiety 1/4  Appetite 100% for breakfast and 75% for lunch  Interacts with select peers  [place]  Unable to identify cause of anxiety  Encouraged ambulation on the unit if he feels as though he needs to exercise  Will continue to monitor and maintain q 7 min checks  The following information and instructions were given:    Do not eat, drink, smoke or chew gum after midnight the night before surgery. This includes no mints.  Take all routine, prescribed medications including heart and blood pressure medicines with a sip of water unless otherwise instructed by your physician.   Do NOT take diabetic medication unless instructed by your physician.      DO NOT shave for two days before your procedure.  Do not wear makeup.      DO NOT wear fingernail polish (gel/regular) and/or acrylic/artificial nails on the day of surgery. If you had a recent manicure and would rather not remove polish or artificial nails, the minimum requirement is that the polish/artificial nails must be removed from the middle finger on each hand.      If you are having surgery/procedure on an upper extremity, fingernail polish/artificial fingernails must be removed for surgery.  NO EXCEPTIONS.      If you are having surgery/procedure on a lower extremity, toenail polish on both extremities must be removed for surgery.  NO EXCEPTIONS.    Remove all jewelry (advise to go to jeweler if unable to remove).  Jewelry, especially rings, can no longer be taped for surgery.    Leave anything you consider valuable at home.    Leave your suitcase in the car until after your surgery if you are staying overnight.    Bring the following with you the day of your procedure (when applicable):       -Picture ID and insurance cards       -Co-pay/deductible required by insurance       -Medications in the original bottles or a detailed list (name, dosage, frequency of medications) including all over-the-counter medications if not brought to PAT       -Copy of advance directive, living will or power of  documents if not brought to PAT       -CPAP or BIPAP mask and tubing (do not bring machine)       -Skin prep instruction(s) sheet       -PAT Pass    Educational handout or binder (joint replacements) related to procedure given  to patient.  Educational handout also includes general information related to the recovery that mentions signs and symptoms of infection and when to call the doctor.    When applicable, an ERAS handout was given to patient.    Respirex use and pneumonia prevention education provided in Pre Admission Testing general education video.    Information related to infection and hand hygiene mentioned in Pre Admission Testing general education video. Patient instructed to call their doctor if any of the following symptoms are noted during recovery:  Fever of 100.4 F or higher, incision that is warm or has increasing bleeding, redness or drainage.    DVT Prevention instructions given in general education video presentation during Pre Admission Testing appointment that stress the importance of ambulation to improve blood circulation.  Also encouraged patient to perform foot exercises when in bed and application of a sequential device may be applied to lower extremities to improve circulation.      Please apply Chlorhexidine wipes to surgical area (if instructed) the night before procedure and the AM of procedure and document date/time of applications on skin prep instruction sheet.

## 2024-10-01 ENCOUNTER — ANESTHESIA EVENT CONVERTED (OUTPATIENT)
Dept: ANESTHESIOLOGY | Facility: HOSPITAL | Age: 69
End: 2024-10-01
Payer: COMMERCIAL

## 2024-10-01 ENCOUNTER — APPOINTMENT (OUTPATIENT)
Dept: NEUROLOGY | Facility: HOSPITAL | Age: 69
End: 2024-10-01
Payer: COMMERCIAL

## 2024-10-01 ENCOUNTER — HOSPITAL ENCOUNTER (INPATIENT)
Facility: HOSPITAL | Age: 69
LOS: 1 days | Discharge: HOME | End: 2024-10-02
Attending: THORACIC SURGERY (CARDIOTHORACIC VASCULAR SURGERY) | Admitting: THORACIC SURGERY (CARDIOTHORACIC VASCULAR SURGERY)
Payer: COMMERCIAL

## 2024-10-01 ENCOUNTER — ANESTHESIA (OUTPATIENT)
Dept: PERIOP | Facility: HOSPITAL | Age: 69
End: 2024-10-01
Payer: COMMERCIAL

## 2024-10-01 ENCOUNTER — ANESTHESIA EVENT (OUTPATIENT)
Dept: PERIOP | Facility: HOSPITAL | Age: 69
End: 2024-10-01
Payer: COMMERCIAL

## 2024-10-01 DIAGNOSIS — I65.23 BILATERAL CAROTID ARTERY STENOSIS: ICD-10-CM

## 2024-10-01 PROBLEM — I35.0 SEVERE AORTIC STENOSIS: Chronic | Status: ACTIVE | Noted: 2024-08-09

## 2024-10-01 PROBLEM — I65.29 CAROTID STENOSIS: Status: ACTIVE | Noted: 2024-10-01

## 2024-10-01 PROBLEM — I25.10 CORONARY ARTERY DISEASE INVOLVING NATIVE HEART: Chronic | Status: ACTIVE | Noted: 2024-08-07

## 2024-10-01 PROBLEM — I10 ESSENTIAL HYPERTENSION: Chronic | Status: ACTIVE | Noted: 2024-09-29

## 2024-10-01 PROBLEM — I25.5 ISCHEMIC CARDIOMYOPATHY: Chronic | Status: ACTIVE | Noted: 2024-08-09

## 2024-10-01 PROBLEM — K21.9 GERD (GASTROESOPHAGEAL REFLUX DISEASE): Chronic | Status: ACTIVE | Noted: 2024-10-01

## 2024-10-01 LAB — GLUCOSE BLDC GLUCOMTR-MCNC: 113 MG/DL (ref 70–130)

## 2024-10-01 PROCEDURE — 95816 EEG AWAKE AND DROWSY: CPT | Performed by: PSYCHIATRY & NEUROLOGY

## 2024-10-01 PROCEDURE — 25010000002 PROTAMINE SULFATE PER 10 MG: Performed by: NURSE ANESTHETIST, CERTIFIED REGISTERED

## 2024-10-01 PROCEDURE — 25810000003 SODIUM CHLORIDE 0.9 % SOLUTION 250 ML FLEX CONT: Performed by: PHYSICIAN ASSISTANT

## 2024-10-01 PROCEDURE — 25810000003 SODIUM CHLORIDE 0.9 % SOLUTION 250 ML FLEX CONT: Performed by: NURSE ANESTHETIST, CERTIFIED REGISTERED

## 2024-10-01 PROCEDURE — 25010000002 NICARDIPINE 2.5 MG/ML SOLUTION 10 ML VIAL: Performed by: PHYSICIAN ASSISTANT

## 2024-10-01 PROCEDURE — 25010000002 NICARDIPINE 2.5 MG/ML SOLUTION 10 ML VIAL: Performed by: NURSE ANESTHETIST, CERTIFIED REGISTERED

## 2024-10-01 PROCEDURE — 25010000002 CEFAZOLIN PER 500 MG: Performed by: PHYSICIAN ASSISTANT

## 2024-10-01 PROCEDURE — 25010000002 LIDOCAINE PF 1% 1 % SOLUTION: Performed by: NURSE ANESTHETIST, CERTIFIED REGISTERED

## 2024-10-01 PROCEDURE — 99222 1ST HOSP IP/OBS MODERATE 55: CPT | Performed by: NURSE PRACTITIONER

## 2024-10-01 PROCEDURE — 25810000003 LACTATED RINGERS PER 1000 ML: Performed by: ANESTHESIOLOGY

## 2024-10-01 PROCEDURE — 25010000002 GENTAMICIN PER 80 MG: Performed by: THORACIC SURGERY (CARDIOTHORACIC VASCULAR SURGERY)

## 2024-10-01 PROCEDURE — C1768 GRAFT, VASCULAR: HCPCS | Performed by: THORACIC SURGERY (CARDIOTHORACIC VASCULAR SURGERY)

## 2024-10-01 PROCEDURE — 35301 RECHANNELING OF ARTERY: CPT | Performed by: THORACIC SURGERY (CARDIOTHORACIC VASCULAR SURGERY)

## 2024-10-01 PROCEDURE — 95816 EEG AWAKE AND DROWSY: CPT

## 2024-10-01 PROCEDURE — 95955 EEG DURING SURGERY: CPT

## 2024-10-01 PROCEDURE — 25010000002 ONDANSETRON PER 1 MG: Performed by: PHYSICIAN ASSISTANT

## 2024-10-01 PROCEDURE — 25810000003 SODIUM CHLORIDE PER 500 ML: Performed by: THORACIC SURGERY (CARDIOTHORACIC VASCULAR SURGERY)

## 2024-10-01 PROCEDURE — 03UK0KZ SUPPLEMENT RIGHT INTERNAL CAROTID ARTERY WITH NONAUTOLOGOUS TISSUE SUBSTITUTE, OPEN APPROACH: ICD-10-PCS | Performed by: THORACIC SURGERY (CARDIOTHORACIC VASCULAR SURGERY)

## 2024-10-01 PROCEDURE — 25010000002 ONDANSETRON PER 1 MG: Performed by: NURSE ANESTHETIST, CERTIFIED REGISTERED

## 2024-10-01 PROCEDURE — 25010000002 CEFAZOLIN PER 500 MG: Performed by: THORACIC SURGERY (CARDIOTHORACIC VASCULAR SURGERY)

## 2024-10-01 PROCEDURE — 25010000002 PROPOFOL 10 MG/ML EMULSION: Performed by: NURSE ANESTHETIST, CERTIFIED REGISTERED

## 2024-10-01 PROCEDURE — 88311 DECALCIFY TISSUE: CPT | Performed by: THORACIC SURGERY (CARDIOTHORACIC VASCULAR SURGERY)

## 2024-10-01 PROCEDURE — 82948 REAGENT STRIP/BLOOD GLUCOSE: CPT

## 2024-10-01 PROCEDURE — 88304 TISSUE EXAM BY PATHOLOGIST: CPT | Performed by: THORACIC SURGERY (CARDIOTHORACIC VASCULAR SURGERY)

## 2024-10-01 PROCEDURE — 25010000002 FENTANYL CITRATE (PF) 50 MCG/ML SOLUTION: Performed by: NURSE ANESTHETIST, CERTIFIED REGISTERED

## 2024-10-01 PROCEDURE — 25010000002 SUGAMMADEX 200 MG/2ML SOLUTION: Performed by: NURSE ANESTHETIST, CERTIFIED REGISTERED

## 2024-10-01 PROCEDURE — 25010000002 NITROGLYCERIN 200 MCG/ML SOLUTION: Performed by: PHYSICIAN ASSISTANT

## 2024-10-01 PROCEDURE — 25010000002 HEPARIN (PORCINE) PER 1000 UNITS: Performed by: NURSE ANESTHETIST, CERTIFIED REGISTERED

## 2024-10-01 PROCEDURE — 25010000002 FENTANYL CITRATE (PF) 50 MCG/ML SOLUTION: Performed by: ANESTHESIOLOGY

## 2024-10-01 PROCEDURE — 25010000002 LIDOCAINE PF 1% 1 % SOLUTION: Performed by: ANESTHESIOLOGY

## 2024-10-01 PROCEDURE — 25010000002 PHENYLEPHRINE 10 MG/ML SOLUTION 1 ML VIAL: Performed by: NURSE ANESTHETIST, CERTIFIED REGISTERED

## 2024-10-01 PROCEDURE — 03CK0ZZ EXTIRPATION OF MATTER FROM RIGHT INTERNAL CAROTID ARTERY, OPEN APPROACH: ICD-10-PCS | Performed by: THORACIC SURGERY (CARDIOTHORACIC VASCULAR SURGERY)

## 2024-10-01 PROCEDURE — 25010000002 HEPARIN (PORCINE) PER 1000 UNITS: Performed by: THORACIC SURGERY (CARDIOTHORACIC VASCULAR SURGERY)

## 2024-10-01 PROCEDURE — 25010000002 LIDOCAINE 1 % SOLUTION: Performed by: THORACIC SURGERY (CARDIOTHORACIC VASCULAR SURGERY)

## 2024-10-01 PROCEDURE — 25810000003 SODIUM CHLORIDE 0.9 % SOLUTION: Performed by: THORACIC SURGERY (CARDIOTHORACIC VASCULAR SURGERY)

## 2024-10-01 PROCEDURE — 25010000002 NICARDIPINE 2.5 MG/ML SOLUTION: Performed by: NURSE ANESTHETIST, CERTIFIED REGISTERED

## 2024-10-01 DEVICE — SEALANT HEMO TACHOSIL FIBRIN PTCH 9.5X4.8CM: Type: IMPLANTABLE DEVICE | Site: CAROTID | Status: FUNCTIONAL

## 2024-10-01 DEVICE — VASCU-GUARD IS PREPARED FROM BOVINE PERICARDIUM CROSS-LINKED WITH GLUTARALDEHYDE, AND TREATED WITH 1 MOLAR SODIUM HYDROXIDE FOR A MINIMUM OF 60 MINUTES AT 20-25 DEGREES C. VASCU-GUARD IS TERMINALLY STERILIZED USING GAMMA IRRADIATION. AND PACKAGED WITHIN A DOUBLE-POUCH SYSTEM. THE CONTENTS OF THE UNOPENED, UNDAMAGED OUTER POUCH ARE STERILE.
Type: IMPLANTABLE DEVICE | Site: CAROTID | Status: FUNCTIONAL
Brand: VASCU-GUARD

## 2024-10-01 RX ORDER — FENTANYL CITRATE 50 UG/ML
50 INJECTION, SOLUTION INTRAMUSCULAR; INTRAVENOUS
Status: DISCONTINUED | OUTPATIENT
Start: 2024-10-01 | End: 2024-10-01 | Stop reason: HOSPADM

## 2024-10-01 RX ORDER — ROSUVASTATIN CALCIUM 20 MG/1
20 TABLET, COATED ORAL NIGHTLY
Status: DISCONTINUED | OUTPATIENT
Start: 2024-10-01 | End: 2024-10-02 | Stop reason: HOSPADM

## 2024-10-01 RX ORDER — LIDOCAINE HYDROCHLORIDE 10 MG/ML
INJECTION, SOLUTION INFILTRATION; PERINEURAL AS NEEDED
Status: DISCONTINUED | OUTPATIENT
Start: 2024-10-01 | End: 2024-10-01 | Stop reason: HOSPADM

## 2024-10-01 RX ORDER — MORPHINE SULFATE 2 MG/ML
2 INJECTION, SOLUTION INTRAMUSCULAR; INTRAVENOUS EVERY 4 HOURS PRN
Status: DISCONTINUED | OUTPATIENT
Start: 2024-10-01 | End: 2024-10-02 | Stop reason: HOSPADM

## 2024-10-01 RX ORDER — ALBUTEROL SULFATE 0.83 MG/ML
1.25 SOLUTION RESPIRATORY (INHALATION) EVERY 6 HOURS PRN
Status: DISCONTINUED | OUTPATIENT
Start: 2024-10-01 | End: 2024-10-02 | Stop reason: HOSPADM

## 2024-10-01 RX ORDER — PROTAMINE SULFATE 10 MG/ML
INJECTION, SOLUTION INTRAVENOUS AS NEEDED
Status: DISCONTINUED | OUTPATIENT
Start: 2024-10-01 | End: 2024-10-01 | Stop reason: SURG

## 2024-10-01 RX ORDER — CARVEDILOL 6.25 MG/1
6.25 TABLET ORAL 2 TIMES DAILY WITH MEALS
Status: DISCONTINUED | OUTPATIENT
Start: 2024-10-01 | End: 2024-10-02 | Stop reason: HOSPADM

## 2024-10-01 RX ORDER — NITROGLYCERIN 20 MG/100ML
10-50 INJECTION INTRAVENOUS
Status: DISCONTINUED | OUTPATIENT
Start: 2024-10-01 | End: 2024-10-01

## 2024-10-01 RX ORDER — FENTANYL CITRATE 50 UG/ML
INJECTION, SOLUTION INTRAMUSCULAR; INTRAVENOUS AS NEEDED
Status: DISCONTINUED | OUTPATIENT
Start: 2024-10-01 | End: 2024-10-01 | Stop reason: SURG

## 2024-10-01 RX ORDER — ONDANSETRON 2 MG/ML
4 INJECTION INTRAMUSCULAR; INTRAVENOUS EVERY 6 HOURS PRN
Status: DISCONTINUED | OUTPATIENT
Start: 2024-10-01 | End: 2024-10-02 | Stop reason: HOSPADM

## 2024-10-01 RX ORDER — ALBUTEROL SULFATE 90 UG/1
2 INHALANT RESPIRATORY (INHALATION) EVERY 4 HOURS PRN
COMMUNITY

## 2024-10-01 RX ORDER — ACETAMINOPHEN 500 MG
1000 TABLET ORAL DAILY
COMMUNITY

## 2024-10-01 RX ORDER — NITROGLYCERIN 20 MG/100ML
INJECTION INTRAVENOUS
Status: DISPENSED
Start: 2024-10-01 | End: 2024-10-02

## 2024-10-01 RX ORDER — CLOPIDOGREL BISULFATE 75 MG/1
75 TABLET ORAL DAILY
Status: DISCONTINUED | OUTPATIENT
Start: 2024-10-01 | End: 2024-10-02 | Stop reason: HOSPADM

## 2024-10-01 RX ORDER — SODIUM CHLORIDE 9 MG/ML
INJECTION, SOLUTION INTRAVENOUS AS NEEDED
Status: DISCONTINUED | OUTPATIENT
Start: 2024-10-01 | End: 2024-10-01 | Stop reason: HOSPADM

## 2024-10-01 RX ORDER — FAMOTIDINE 20 MG/1
20 TABLET, FILM COATED ORAL EVERY 12 HOURS SCHEDULED
Status: DISCONTINUED | OUTPATIENT
Start: 2024-10-01 | End: 2024-10-02 | Stop reason: HOSPADM

## 2024-10-01 RX ORDER — ROCURONIUM BROMIDE 10 MG/ML
INJECTION, SOLUTION INTRAVENOUS AS NEEDED
Status: DISCONTINUED | OUTPATIENT
Start: 2024-10-01 | End: 2024-10-01 | Stop reason: SURG

## 2024-10-01 RX ORDER — ALBUTEROL SULFATE 0.83 MG/ML
2.5 SOLUTION RESPIRATORY (INHALATION) EVERY 4 HOURS PRN
Status: ON HOLD | COMMUNITY
End: 2024-10-01

## 2024-10-01 RX ORDER — ASPIRIN 81 MG/1
81 TABLET ORAL DAILY
Status: DISCONTINUED | OUTPATIENT
Start: 2024-10-02 | End: 2024-10-02 | Stop reason: HOSPADM

## 2024-10-01 RX ORDER — CLOPIDOGREL BISULFATE 75 MG/1
75 TABLET ORAL DAILY
COMMUNITY

## 2024-10-01 RX ORDER — NITROGLYCERIN 20 MG/100ML
10-50 INJECTION INTRAVENOUS
Status: DISCONTINUED | OUTPATIENT
Start: 2024-10-01 | End: 2024-10-02 | Stop reason: HOSPADM

## 2024-10-01 RX ORDER — ONDANSETRON 2 MG/ML
INJECTION INTRAMUSCULAR; INTRAVENOUS AS NEEDED
Status: DISCONTINUED | OUTPATIENT
Start: 2024-10-01 | End: 2024-10-01 | Stop reason: SURG

## 2024-10-01 RX ORDER — FAMOTIDINE 10 MG/ML
20 INJECTION, SOLUTION INTRAVENOUS ONCE
Status: CANCELLED | OUTPATIENT
Start: 2024-10-01 | End: 2024-10-01

## 2024-10-01 RX ORDER — FENTANYL CITRATE 50 UG/ML
INJECTION, SOLUTION INTRAMUSCULAR; INTRAVENOUS
Status: DISPENSED
Start: 2024-10-01 | End: 2024-10-02

## 2024-10-01 RX ORDER — MIDAZOLAM HYDROCHLORIDE 1 MG/ML
0.5 INJECTION INTRAMUSCULAR; INTRAVENOUS
Status: DISCONTINUED | OUTPATIENT
Start: 2024-10-01 | End: 2024-10-01 | Stop reason: HOSPADM

## 2024-10-01 RX ORDER — LIDOCAINE HYDROCHLORIDE 10 MG/ML
0.5 INJECTION, SOLUTION EPIDURAL; INFILTRATION; INTRACAUDAL; PERINEURAL ONCE AS NEEDED
Status: COMPLETED | OUTPATIENT
Start: 2024-10-01 | End: 2024-10-01

## 2024-10-01 RX ORDER — LIDOCAINE HYDROCHLORIDE 10 MG/ML
INJECTION, SOLUTION EPIDURAL; INFILTRATION; INTRACAUDAL; PERINEURAL AS NEEDED
Status: DISCONTINUED | OUTPATIENT
Start: 2024-10-01 | End: 2024-10-01 | Stop reason: SURG

## 2024-10-01 RX ORDER — HYDROCODONE BITARTRATE AND ACETAMINOPHEN 5; 325 MG/1; MG/1
1 TABLET ORAL EVERY 6 HOURS PRN
COMMUNITY

## 2024-10-01 RX ORDER — SODIUM CHLORIDE 450 MG/100ML
30 INJECTION, SOLUTION INTRAVENOUS CONTINUOUS
Status: DISCONTINUED | OUTPATIENT
Start: 2024-10-01 | End: 2024-10-02 | Stop reason: HOSPADM

## 2024-10-01 RX ORDER — HYDROCODONE BITARTRATE AND ACETAMINOPHEN 7.5; 325 MG/1; MG/1
1 TABLET ORAL EVERY 6 HOURS PRN
Status: DISCONTINUED | OUTPATIENT
Start: 2024-10-01 | End: 2024-10-02 | Stop reason: HOSPADM

## 2024-10-01 RX ORDER — NICARDIPINE HYDROCHLORIDE 2.5 MG/ML
INJECTION INTRAVENOUS AS NEEDED
Status: DISCONTINUED | OUTPATIENT
Start: 2024-10-01 | End: 2024-10-01 | Stop reason: SURG

## 2024-10-01 RX ORDER — FUROSEMIDE 40 MG
20 TABLET ORAL DAILY
COMMUNITY

## 2024-10-01 RX ORDER — SODIUM CHLORIDE 0.9 % (FLUSH) 0.9 %
10 SYRINGE (ML) INJECTION EVERY 12 HOURS SCHEDULED
Status: DISCONTINUED | OUTPATIENT
Start: 2024-10-01 | End: 2024-10-02 | Stop reason: HOSPADM

## 2024-10-01 RX ORDER — HYDROMORPHONE HYDROCHLORIDE 1 MG/ML
0.5 INJECTION, SOLUTION INTRAMUSCULAR; INTRAVENOUS; SUBCUTANEOUS
Status: DISCONTINUED | OUTPATIENT
Start: 2024-10-01 | End: 2024-10-01 | Stop reason: HOSPADM

## 2024-10-01 RX ORDER — SODIUM CHLORIDE, SODIUM LACTATE, POTASSIUM CHLORIDE, CALCIUM CHLORIDE 600; 310; 30; 20 MG/100ML; MG/100ML; MG/100ML; MG/100ML
9 INJECTION, SOLUTION INTRAVENOUS CONTINUOUS
Status: DISCONTINUED | OUTPATIENT
Start: 2024-10-01 | End: 2024-10-02 | Stop reason: HOSPADM

## 2024-10-01 RX ORDER — DROPERIDOL 2.5 MG/ML
0.62 INJECTION, SOLUTION INTRAMUSCULAR; INTRAVENOUS ONCE AS NEEDED
Status: DISCONTINUED | OUTPATIENT
Start: 2024-10-01 | End: 2024-10-01 | Stop reason: HOSPADM

## 2024-10-01 RX ORDER — CHLORHEXIDINE GLUCONATE 500 MG/1
1 CLOTH TOPICAL EVERY 12 HOURS PRN
Status: DISCONTINUED | OUTPATIENT
Start: 2024-10-01 | End: 2024-10-02 | Stop reason: HOSPADM

## 2024-10-01 RX ORDER — ONDANSETRON 4 MG/1
4 TABLET, ORALLY DISINTEGRATING ORAL EVERY 6 HOURS PRN
Status: DISCONTINUED | OUTPATIENT
Start: 2024-10-01 | End: 2024-10-02 | Stop reason: HOSPADM

## 2024-10-01 RX ORDER — BUPIVACAINE HCL/0.9 % NACL/PF 0.125 %
PLASTIC BAG, INJECTION (ML) EPIDURAL AS NEEDED
Status: DISCONTINUED | OUTPATIENT
Start: 2024-10-01 | End: 2024-10-01 | Stop reason: SURG

## 2024-10-01 RX ORDER — SODIUM CHLORIDE 9 MG/ML
9 INJECTION, SOLUTION INTRAVENOUS CONTINUOUS
Status: DISCONTINUED | OUTPATIENT
Start: 2024-10-01 | End: 2024-10-02 | Stop reason: HOSPADM

## 2024-10-01 RX ORDER — PROPOFOL 10 MG/ML
VIAL (ML) INTRAVENOUS AS NEEDED
Status: DISCONTINUED | OUTPATIENT
Start: 2024-10-01 | End: 2024-10-01 | Stop reason: SURG

## 2024-10-01 RX ORDER — FAMOTIDINE 20 MG/1
20 TABLET, FILM COATED ORAL ONCE
Status: COMPLETED | OUTPATIENT
Start: 2024-10-01 | End: 2024-10-01

## 2024-10-01 RX ORDER — SODIUM CHLORIDE 0.9 % (FLUSH) 0.9 %
10 SYRINGE (ML) INJECTION EVERY 12 HOURS SCHEDULED
Status: DISCONTINUED | OUTPATIENT
Start: 2024-10-01 | End: 2024-10-01 | Stop reason: HOSPADM

## 2024-10-01 RX ORDER — FUROSEMIDE 20 MG
20 TABLET ORAL DAILY
Status: DISCONTINUED | OUTPATIENT
Start: 2024-10-01 | End: 2024-10-02 | Stop reason: HOSPADM

## 2024-10-01 RX ORDER — LABETALOL HYDROCHLORIDE 5 MG/ML
INJECTION, SOLUTION INTRAVENOUS
Status: DISPENSED
Start: 2024-10-01 | End: 2024-10-02

## 2024-10-01 RX ORDER — SODIUM CHLORIDE 0.9 % (FLUSH) 0.9 %
10 SYRINGE (ML) INJECTION AS NEEDED
Status: DISCONTINUED | OUTPATIENT
Start: 2024-10-01 | End: 2024-10-01 | Stop reason: HOSPADM

## 2024-10-01 RX ORDER — SODIUM CHLORIDE 9 MG/ML
40 INJECTION, SOLUTION INTRAVENOUS AS NEEDED
Status: DISCONTINUED | OUTPATIENT
Start: 2024-10-01 | End: 2024-10-02 | Stop reason: HOSPADM

## 2024-10-01 RX ORDER — SODIUM CHLORIDE 0.9 % (FLUSH) 0.9 %
10 SYRINGE (ML) INJECTION AS NEEDED
Status: DISCONTINUED | OUTPATIENT
Start: 2024-10-01 | End: 2024-10-02 | Stop reason: HOSPADM

## 2024-10-01 RX ORDER — SODIUM CHLORIDE 9 MG/ML
40 INJECTION, SOLUTION INTRAVENOUS AS NEEDED
Status: DISCONTINUED | OUTPATIENT
Start: 2024-10-01 | End: 2024-10-01 | Stop reason: HOSPADM

## 2024-10-01 RX ORDER — ACETAMINOPHEN 325 MG/1
650 TABLET ORAL EVERY 6 HOURS PRN
Status: DISCONTINUED | OUTPATIENT
Start: 2024-10-01 | End: 2024-10-02 | Stop reason: HOSPADM

## 2024-10-01 RX ORDER — HEPARIN SODIUM 1000 [USP'U]/ML
INJECTION, SOLUTION INTRAVENOUS; SUBCUTANEOUS AS NEEDED
Status: DISCONTINUED | OUTPATIENT
Start: 2024-10-01 | End: 2024-10-01 | Stop reason: SURG

## 2024-10-01 RX ORDER — ALBUTEROL SULFATE 90 UG/1
2 INHALANT RESPIRATORY (INHALATION) EVERY 4 HOURS PRN
Status: DISCONTINUED | OUTPATIENT
Start: 2024-10-01 | End: 2024-10-02 | Stop reason: HOSPADM

## 2024-10-01 RX ORDER — POTASSIUM CHLORIDE 750 MG/1
10 CAPSULE, EXTENDED RELEASE ORAL DAILY
Status: DISCONTINUED | OUTPATIENT
Start: 2024-10-02 | End: 2024-10-02 | Stop reason: HOSPADM

## 2024-10-01 RX ADMIN — NICARDIPINE HYDROCHLORIDE 200 MCG: 25 INJECTION INTRAVENOUS at 12:54

## 2024-10-01 RX ADMIN — SODIUM CHLORIDE 5 MG/HR: 9 INJECTION, SOLUTION INTRAVENOUS at 14:38

## 2024-10-01 RX ADMIN — PROPOFOL 150 MG: 10 INJECTION, EMULSION INTRAVENOUS at 12:33

## 2024-10-01 RX ADMIN — LIDOCAINE HYDROCHLORIDE 50 MG: 10 INJECTION, SOLUTION EPIDURAL; INFILTRATION; INTRACAUDAL; PERINEURAL at 12:33

## 2024-10-01 RX ADMIN — EMPAGLIFLOZIN 10 MG: 10 TABLET, FILM COATED ORAL at 16:13

## 2024-10-01 RX ADMIN — LIDOCAINE HYDROCHLORIDE 0.5 ML: 10 INJECTION, SOLUTION EPIDURAL; INFILTRATION; INTRACAUDAL; PERINEURAL at 10:42

## 2024-10-01 RX ADMIN — CLOPIDOGREL BISULFATE 75 MG: 75 TABLET ORAL at 16:13

## 2024-10-01 RX ADMIN — SODIUM CHLORIDE 9 ML/HR: 9 INJECTION, SOLUTION INTRAVENOUS at 10:43

## 2024-10-01 RX ADMIN — HYDROCODONE BITARTRATE AND ACETAMINOPHEN 1 TABLET: 7.5; 325 TABLET ORAL at 16:13

## 2024-10-01 RX ADMIN — NICARDIPINE HYDROCHLORIDE 300 MCG: 25 INJECTION INTRAVENOUS at 13:04

## 2024-10-01 RX ADMIN — SODIUM CHLORIDE 2 G: 900 INJECTION INTRAVENOUS at 12:37

## 2024-10-01 RX ADMIN — SODIUM CHLORIDE 2000 MG: 900 INJECTION INTRAVENOUS at 20:20

## 2024-10-01 RX ADMIN — NICARDIPINE HYDROCHLORIDE 5 MG/HR: 25 INJECTION, SOLUTION INTRAVENOUS at 12:51

## 2024-10-01 RX ADMIN — ONDANSETRON 4 MG: 2 INJECTION INTRAMUSCULAR; INTRAVENOUS at 13:11

## 2024-10-01 RX ADMIN — SODIUM CHLORIDE 9 ML/HR: 9 INJECTION, SOLUTION INTRAVENOUS at 10:42

## 2024-10-01 RX ADMIN — HEPARIN SODIUM 7000 UNITS: 1000 INJECTION INTRAVENOUS; SUBCUTANEOUS at 12:49

## 2024-10-01 RX ADMIN — Medication 400 MCG: at 13:26

## 2024-10-01 RX ADMIN — Medication 200 MCG: at 13:20

## 2024-10-01 RX ADMIN — CARVEDILOL 6.25 MG: 6.25 TABLET, FILM COATED ORAL at 17:30

## 2024-10-01 RX ADMIN — FAMOTIDINE 20 MG: 20 TABLET ORAL at 10:42

## 2024-10-01 RX ADMIN — ONDANSETRON 4 MG: 2 INJECTION INTRAMUSCULAR; INTRAVENOUS at 16:05

## 2024-10-01 RX ADMIN — SUGAMMADEX 200 MG: 100 INJECTION, SOLUTION INTRAVENOUS at 13:16

## 2024-10-01 RX ADMIN — FENTANYL CITRATE 50 MCG: 50 INJECTION, SOLUTION INTRAMUSCULAR; INTRAVENOUS at 14:58

## 2024-10-01 RX ADMIN — FENTANYL CITRATE 100 MCG: 50 INJECTION, SOLUTION INTRAMUSCULAR; INTRAVENOUS at 12:47

## 2024-10-01 RX ADMIN — PHENYLEPHRINE HYDROCHLORIDE 0.5 MCG/KG/MIN: 10 INJECTION INTRAVENOUS at 12:47

## 2024-10-01 RX ADMIN — SODIUM CHLORIDE, POTASSIUM CHLORIDE, SODIUM LACTATE AND CALCIUM CHLORIDE 9 ML/HR: 600; 310; 30; 20 INJECTION, SOLUTION INTRAVENOUS at 10:42

## 2024-10-01 RX ADMIN — FAMOTIDINE 20 MG: 20 TABLET, FILM COATED ORAL at 20:20

## 2024-10-01 RX ADMIN — NITROGLYCERIN 10 MCG/MIN: 20 INJECTION INTRAVENOUS at 14:02

## 2024-10-01 RX ADMIN — NICARDIPINE HYDROCHLORIDE 300 MCG: 25 INJECTION INTRAVENOUS at 13:01

## 2024-10-01 RX ADMIN — NICARDIPINE HYDROCHLORIDE 300 MCG: 25 INJECTION INTRAVENOUS at 12:51

## 2024-10-01 RX ADMIN — ROCURONIUM BROMIDE 50 MG: 10 INJECTION INTRAVENOUS at 12:33

## 2024-10-01 RX ADMIN — ACETAMINOPHEN 650 MG: 325 TABLET ORAL at 20:34

## 2024-10-01 RX ADMIN — PROTAMINE SULFATE 75 MG: 10 INJECTION, SOLUTION INTRAVENOUS at 13:12

## 2024-10-01 RX ADMIN — Medication 10 ML: at 20:20

## 2024-10-01 RX ADMIN — NICARDIPINE HYDROCHLORIDE 200 MCG: 25 INJECTION INTRAVENOUS at 12:56

## 2024-10-01 RX ADMIN — ROSUVASTATIN 20 MG: 20 TABLET, FILM COATED ORAL at 20:20

## 2024-10-01 NOTE — ANESTHESIA PROCEDURE NOTES
Arterial Line      Patient reassessed immediately prior to procedure    Patient location during procedure: pre-op  Stop Time:10/1/2024 1:54 PM       Line placed for hemodynamic monitoring.  Performed By   ESPERANZA/CAA: Vaibhav Huff CRNA   Preanesthetic Checklist  Completed: patient identified, IV checked, site marked, risks and benefits discussed, surgical consent, monitors and equipment checked, pre-op evaluation and timeout performed  Arterial Line Prep    Sterile Tech: cap, gloves and sterile barriers  Prep: ChloraPrep  Patient monitoring: blood pressure monitoring, continuous pulse oximetry and EKG  Arterial Line Procedure   Laterality:left  Location:  radial artery  Catheter size: 20 G   Guidance: ultrasound guided  PROCEDURE NOTE/ULTRASOUND INTERPRETATION.  Using ultrasound guidance the potential vascular sites for insertion of the catheter were visualized to determine the patency of the vessel to be used for vascular access.  After selecting the appropriate site for insertion, the needle was visualized under ultrasound being inserted into the radial artery, followed by ultrasound confirmation of wire and catheter placement. There were no abnormalities seen on ultrasound; an image was taken; and the patient tolerated the procedure with no complications.   Number of attempts: 1  Successful placement: yes Images: still images not obtained  Post Assessment   Dressing Type: line sutured, occlusive dressing applied, secured with tape and wrist guard applied.   Complications no  Circ/Move/Sens Assessment: normal and unchanged.   Patient Tolerance: patient tolerated the procedure well with no apparent complications

## 2024-10-01 NOTE — PROGRESS NOTES
Intensive Care Follow-up     Hospital:  LOS: 0 days   Mr. Preet Edwards, 69 y.o. male is followed for:   Bilateral carotid artery stenosis     Subjective   Interval History:  Patient examined upon arrival to ICU, sitting up in bed with family present at bedside. Nauseated and RN is administering Zofran. SBP currently 110's on NTG infusion and patient is oxygenating appropriately on RA. He denies current blurred vision, vision changes, numbness, tingling, or extremity weakness.      The patient's past medical, surgical and social history were reviewed and updated in Epic as appropriate.    Objective     Infusions:  niCARdipine, 5-15 mg/hr  phenylephrine, 0.5-3 mcg/kg/min      Medications:     I reviewed the patient's medications.    Vital Sign Min/Max for last 24 hours  No data recorded   BP  Min: 138/56  Max: 138/56   Pulse  Min: 69  Max: 69   No data recorded   SpO2  Min: 97 %  Max: 97 %   No data recorded       Input/Output for last 24 hour shift  No intake/output data recorded.     Physical Exam:  GENERAL: Male resting supine in bed and conversant. Mild distress.   HEENT: Normocephalic. Trachea midline. PER. EOM WNL. Right neck surgical dressing C/D/I. JAIMIE drain in place with scant bloody drainage present.    LUNGS: Chest rise of normal depth and symmetric. Lungs clear to auscultation bilaterally. No wheezes, rhonchi, or rales.   HEART: S1,S2 detected. Regular rate and rhythm. No rub, murmur, or gallop.   ABDOMEN: Soft, round, nondistended, and nontender. Bowel sounds present.   EXTREMITIES: No clubbing, edema, or cyanosis. Peripheral pulses present. Skin warm and dry.    NEURO/PSYCH: Alert and oriented. Follows commands. Moves all extremities. No focal deficits.      Results from last 7 days   Lab Units 09/30/24  1513   WBC 10*3/mm3 8.88   HEMOGLOBIN g/dL 12.1*   PLATELETS 10*3/mm3 221     Results from last 7 days   Lab Units 09/30/24  1513   SODIUM mmol/L 142   POTASSIUM mmol/L 4.4   CO2 mmol/L 30.0*   BUN  mg/dL 37*   CREATININE mg/dL 1.77*   GLUCOSE mg/dL 139*     Estimated Creatinine Clearance: 43 mL/min (A) (by C-G formula based on SCr of 1.77 mg/dL (H)).    I reviewed the patient's new clinical results.  I reviewed the patient's new imaging results/reports including actual images and agree with reports.     Assessment & Plan   Impression      Bilateral carotid artery stenosis (>70%)    CAD s/p CABG x3 (8/9/24)    ICM / HFrEF (EF 31-35%)    Severe aortic stenosis s/p AVR (8/9/24)    Dyslipidemia    Essential hypertension    GERD (gastroesophageal reflux disease)    69 yoM former smoker with PMH of COPD (although most recent PFTs demonstrate no obstruction), HTN, dyslipidemia, CAD & AS s/p CABG x3 & AVR (8/9/24 per Dr. Gibson), ICM/HFrEF (EF 31-35%), GERD, and bilateral internal carotid artery stenosis who was admitted to [facility] ICU from OR today s/p elective right CEA per Dr. Pisano.      Plan        Postoperative management per Dr. Pisano   Neurovascular checks per protocol   Continue ASA + Plavix + Statin   BP control; wean NTG infusion as able   Monitor for any S/S bleeding   Ensure effective pain control   For postoperative N/V: Zofran PRN   For COPD: Continue PRN nebulizers   For HTN & HFrEF: Continue BB & Lasix   For Dyslipidemia: Continue Statin   For GERD: Continue Pepcid   AM labs     DVT Prophylaxis: SCDs  GI Prophylaxis: Pepcid   Dispo: Monitor in ICU     Time spent: 57 minutes   Plan of care and goals reviewed with multidisciplinary/antibiotic stewardship team during rounds.   I discussed the patient's findings and my recommendations with patient, family, and nursing staff     Naz Carrasco, DNP, APRN, AGACNP-BC  Pulmonary and Critical Care Medicine

## 2024-10-01 NOTE — ANESTHESIA PROCEDURE NOTES
Arterial Line      Patient reassessed immediately prior to procedure    Patient location during procedure: pre-op   Line placed for hemodynamic monitoring.  Performed By   Anesthesiologist: Keegan Ortiz MD   Preanesthetic Checklist  Completed: patient identified, IV checked, site marked, risks and benefits discussed, surgical consent, monitors and equipment checked, pre-op evaluation and timeout performed  Arterial Line Prep    Sterile Tech: cap, gloves and sterile barriers  Prep: ChloraPrep  Patient monitoring: blood pressure monitoring, continuous pulse oximetry and EKG  Arterial Line Procedure   Laterality:right  Location:  radial artery  Catheter size: 20 G   Guidance: ultrasound guided  PROCEDURE NOTE/ULTRASOUND INTERPRETATION.  Using ultrasound guidance the potential vascular sites for insertion of the catheter were visualized to determine the patency of the vessel to be used for vascular access.  After selecting the appropriate site for insertion, the needle was visualized under ultrasound being inserted into the radial artery, followed by ultrasound confirmation of wire and catheter placement. There were no abnormalities seen on ultrasound; an image was taken; and the patient tolerated the procedure with no complications.   Number of attempts: 1  Successful placement: yes Images: still images not obtained  Post Assessment   Dressing Type: line sutured, occlusive dressing applied, secured with tape and wrist guard applied.   Complications no  Circ/Move/Sens Assessment: normal and unchanged.   Patient Tolerance: patient tolerated the procedure well with no apparent complications

## 2024-10-01 NOTE — ANESTHESIA PREPROCEDURE EVALUATION
Anesthesia Evaluation     Patient summary reviewed and Nursing notes reviewed                Airway   Mallampati: II  TM distance: >3 FB  Neck ROM: full  No difficulty expected  Comment: LIGHT BEARD  Dental - normal exam   (+) edentulous, upper dentures and lower dentures    Pulmonary - normal exam   (+) a smoker Former, COPD,  Cardiovascular - normal exam    (+) hypertension, valvular problems/murmurs (S/P AVR/CABG 8/9/24) AS, past MI , CAD,  carotid artery disease    ROS comment: EF 35% BY ECHO 8/\15/24    Neuro/Psych- negative ROS  GI/Hepatic/Renal/Endo    (+) GERD, diabetes mellitus    Musculoskeletal     Abdominal  - normal exam    Bowel sounds: normal.   Substance History - negative use     OB/GYN negative ob/gyn ROS         Other   arthritis,                   Anesthesia Plan    ASA 4     general and Wendy     intravenous induction     Anesthetic plan, risks, benefits, and alternatives have been provided, discussed and informed consent has been obtained with: patient.    Plan discussed with CRNA.    CODE STATUS:

## 2024-10-01 NOTE — INTERVAL H&P NOTE
[facility] Pre-op    Full history and physical note from office is attached.    VS: /63  HR 77  RR 16  T 98.0  sat 98%RA    Immunizations:  Influenza:  No  Pneumococcal:  No  Tetanus:  UTD    Review of Systems:  Constitutional-- No fever, chills or sweats. No fatigue.  CV-- No chest pain, palpitation or syncope  Resp-- No SOB, cough, hemoptysis  Skin--No rashes or lesions    Physical Exam:  Heart:   Regular rate and rhythm, S1 and S2 normal  Lungs: Clear to auscultation bilaterally, respirations unlabored    LAB Results:  Lab Results   Component Value Date    WBC 8.88 09/30/2024    HGB 12.1 (L) 09/30/2024    HCT 37.4 (L) 09/30/2024    MCV 89.7 09/30/2024     09/30/2024    NEUTROABS 6.62 08/14/2024    GLUCOSE 139 (H) 09/30/2024    BUN 37 (H) 09/30/2024    CREATININE 1.77 (H) 09/30/2024     09/30/2024    K 4.4 09/30/2024     09/30/2024    CO2 30.0 (H) 09/30/2024    MG 2.8 (H) 08/12/2024    PHOS 2.6 08/12/2024    CALCIUM 9.6 09/30/2024    ALBUMIN 4.3 08/10/2024    AST 36 08/10/2024    ALT 14 08/10/2024    BILITOT 1.7 (H) 08/10/2024    .1 (H) 08/07/2024    INR 1.08 09/30/2024     Study Result    Narrative & Impression   CT ANGIOGRAM CAROTIDS     Date of Exam: 9/18/2024 8:28 AM EDT     Indication: CAROTIS STENOSIS.     Comparison: None available.     Technique: CTA of the neck was performed before and after the uneventful intravenous administration of 75 mL Isovue-370. Reconstructed coronal and sagittal images were also obtained. In addition, a 3-D volume rendered image was created for   interpretation. Automated exposure control and iterative reconstruction methods were used.     Findings:  There is a three-vessel aortic arch. The right common carotid artery appears widely patent. There is partially calcified atherosclerotic plaque along the right carotid bifurcation resulting in focal 90% stenosis along the proximal right internal carotid   artery and distally it is widely  patent. The left common carotid artery appears widely patent with mild plaque distally. There is atherosclerotic plaque along the left carotid bifurcation resulting in focal 55% stenosis along the proximal left internal   carotid artery and distally it is widely patent. The right vertebral artery is patent with atherosclerotic plaque along its origin and focal moderate stenosis along its V1 segment. The left vertebral artery is patent with several areas of focal mild   stenosis along its V2 and V4 segments. The vertebral arteries are codominant.     IMPRESSION:  Impression:  1.Atherosclerotic plaque along right carotid bifurcation resulting in focal 90% stenosis along proximal right ICA.  2.Atherosclerotic plaque along left carotid bifurcation resulting in focal 55% stenosis along proximal left ICA.     Cancer Staging (if applicable)  Cancer Patient: __ yes __no __unknown__N/A; If yes, clinical stage T:__ N:__M:__, stage group or __N/A      Impression: Bilateral carotid artery stenosis (>70%)       Plan: RIGHT CAROTID ENDARTERECTOMY/EEG   Patient is aware the procedure planned risk of stroke bleed infection death and agrees to proceed    DESEAN Palomino   10/1/2024   10:12 EDT

## 2024-10-01 NOTE — PLAN OF CARE
Goal Outcome Evaluation:    Carotid endarterectomy from PACU @ 1530.     Neurologically intact. Speech is hoarse but baseline.      Nitro drip to keep SBP < 120.     JAIMIE Drain had minimal output. 10 mls.     Likely home tomorrow.

## 2024-10-01 NOTE — ANESTHESIA POSTPROCEDURE EVALUATION
Patient: Preet Edwards    Procedure Summary       Date: 10/01/24 Room / Location:  [facility]  [facility]    Anesthesia Start: 1229 Anesthesia Stop: 1400    Procedure: RIGHT CAROTID ENDARTERECTOMY/EEG (Right: Neck) Diagnosis:       Bilateral carotid artery stenosis      (Bilateral carotid artery stenosis [I65.23])    Surgeons: Anderson Pisano MD Provider: Keegan Ortiz MD    Anesthesia Type: general, [place] ASA Status: 4            Anesthesia Type: general, Wendy    Vitals  Vitals Value Taken Time   /58 10/01/24 1402   Temp     Pulse 73 10/01/24 1403   Resp     SpO2 100 % 10/01/24 1403   Vitals shown include unfiled device data.        Post Anesthesia Care and Evaluation    Patient location during evaluation: PACU  Patient participation: complete - patient participated  Level of consciousness: responsive to verbal stimuli  Pain score: 0  Pain management: adequate    Airway patency: patent  Anesthetic complications: No anesthetic complications  PONV Status: none  Cardiovascular status: hemodynamically stable and acceptable  Respiratory status: nonlabored ventilation, acceptable, nasal cannula and spontaneous ventilation  Hydration status: acceptable    Comments: VSS  No anesthesia care post op

## 2024-10-01 NOTE — ANESTHESIA PROCEDURE NOTES
Airway  Urgency: elective    Date/Time: 10/1/2024 12:36 PM  Airway not difficult    General Information and Staff    Patient location during procedure: OR  SRNA: Brandon Bone SRNA  Indications and Patient Condition  Indications for airway management: airway protection    Preoxygenated: yes  MILS not maintained throughout  Mask difficulty assessment: 2 - vent by mask + OA or adjuvant +/- NMBA    Final Airway Details  Final airway type: endotracheal airway      Successful airway: ETT  Cuffed: yes   Successful intubation technique: direct laryngoscopy  Endotracheal tube insertion site: oral  Blade: [place]far[place]  Blade size: 2  ETT size (mm): 7.5  Cormack-Lehane Classification: grade I - full view of glottis  Placement verified by: chest auscultation and capnometry   Measured from: lips  ETT/EBT  to lips (cm): 20  Number of attempts at approach: 1  Assessment: lips, teeth, and gum same as pre-op and atraumatic intubation    Additional Comments  Negative epigastric sounds, Breath sound equal bilaterally with symmetric chest rise and fall

## 2024-10-02 VITALS
OXYGEN SATURATION: 95 % | TEMPERATURE: 97.3 F | BODY MASS INDEX: 25.04 KG/M2 | DIASTOLIC BLOOD PRESSURE: 58 MMHG | WEIGHT: 165.24 LBS | HEIGHT: 68 IN | HEART RATE: 74 BPM | RESPIRATION RATE: 18 BRPM | SYSTOLIC BLOOD PRESSURE: 133 MMHG

## 2024-10-02 LAB
ANION GAP SERPL CALCULATED.3IONS-SCNC: 11 MMOL/L (ref 5–15)
BASOPHILS # BLD AUTO: 0.01 10*3/MM3 (ref 0–0.2)
BASOPHILS NFR BLD AUTO: 0.1 % (ref 0–1.5)
BUN SERPL-MCNC: 36 MG/DL (ref 8–23)
BUN/CREAT SERPL: 21.7 (ref 7–25)
CALCIUM SPEC-SCNC: 8.5 MG/DL (ref 8.6–10.5)
CHLORIDE SERPL-SCNC: 103 MMOL/L (ref 98–107)
CO2 SERPL-SCNC: 25 MMOL/L (ref 22–29)
CREAT SERPL-MCNC: 1.66 MG/DL (ref 0.76–1.27)
DEPRECATED RDW RBC AUTO: 43.5 FL (ref 37–54)
EGFRCR SERPLBLD CKD-EPI 2021: 44.9 ML/MIN/1.73
EOSINOPHIL # BLD AUTO: 0.01 10*3/MM3 (ref 0–0.4)
EOSINOPHIL NFR BLD AUTO: 0.1 % (ref 0.3–6.2)
ERYTHROCYTE [DISTWIDTH] IN BLOOD BY AUTOMATED COUNT: 13.5 % (ref 12.3–15.4)
GLUCOSE SERPL-MCNC: 126 MG/DL (ref 65–99)
HCT VFR BLD AUTO: 29.7 % (ref 37.5–51)
HGB BLD-MCNC: 9.6 G/DL (ref 13–17.7)
IMM GRANULOCYTES # BLD AUTO: 0.04 10*3/MM3 (ref 0–0.05)
IMM GRANULOCYTES NFR BLD AUTO: 0.4 % (ref 0–0.5)
LYMPHOCYTES # BLD AUTO: 1.05 10*3/MM3 (ref 0.7–3.1)
LYMPHOCYTES NFR BLD AUTO: 11.6 % (ref 19.6–45.3)
MAGNESIUM SERPL-MCNC: 2.3 MG/DL (ref 1.6–2.4)
MCH RBC QN AUTO: 28.7 PG (ref 26.6–33)
MCHC RBC AUTO-ENTMCNC: 32.3 G/DL (ref 31.5–35.7)
MCV RBC AUTO: 88.7 FL (ref 79–97)
MONOCYTES # BLD AUTO: 0.55 10*3/MM3 (ref 0.1–0.9)
MONOCYTES NFR BLD AUTO: 6.1 % (ref 5–12)
NEUTROPHILS NFR BLD AUTO: 7.38 10*3/MM3 (ref 1.7–7)
NEUTROPHILS NFR BLD AUTO: 81.7 % (ref 42.7–76)
NRBC BLD AUTO-RTO: 0 /100 WBC (ref 0–0.2)
PHOSPHATE SERPL-MCNC: 4.2 MG/DL (ref 2.5–4.5)
PLATELET # BLD AUTO: 196 10*3/MM3 (ref 140–450)
PMV BLD AUTO: 11.2 FL (ref 6–12)
POTASSIUM SERPL-SCNC: 4.6 MMOL/L (ref 3.5–5.2)
RBC # BLD AUTO: 3.35 10*6/MM3 (ref 4.14–5.8)
SODIUM SERPL-SCNC: 139 MMOL/L (ref 136–145)
WBC NRBC COR # BLD AUTO: 9.04 10*3/MM3 (ref 3.4–10.8)

## 2024-10-02 PROCEDURE — 83735 ASSAY OF MAGNESIUM: CPT | Performed by: NURSE PRACTITIONER

## 2024-10-02 PROCEDURE — 80048 BASIC METABOLIC PNL TOTAL CA: CPT | Performed by: PHYSICIAN ASSISTANT

## 2024-10-02 PROCEDURE — 25010000002 CEFAZOLIN PER 500 MG: Performed by: PHYSICIAN ASSISTANT

## 2024-10-02 PROCEDURE — 84100 ASSAY OF PHOSPHORUS: CPT | Performed by: NURSE PRACTITIONER

## 2024-10-02 PROCEDURE — 99024 POSTOP FOLLOW-UP VISIT: CPT | Performed by: THORACIC SURGERY (CARDIOTHORACIC VASCULAR SURGERY)

## 2024-10-02 PROCEDURE — 25010000002 NITROGLYCERIN 200 MCG/ML SOLUTION: Performed by: PHYSICIAN ASSISTANT

## 2024-10-02 PROCEDURE — 85025 COMPLETE CBC W/AUTO DIFF WBC: CPT | Performed by: PHYSICIAN ASSISTANT

## 2024-10-02 RX ADMIN — SODIUM CHLORIDE 2000 MG: 900 INJECTION INTRAVENOUS at 04:07

## 2024-10-02 RX ADMIN — FUROSEMIDE 20 MG: 20 TABLET ORAL at 08:23

## 2024-10-02 RX ADMIN — FAMOTIDINE 20 MG: 20 TABLET, FILM COATED ORAL at 08:23

## 2024-10-02 RX ADMIN — NITROGLYCERIN 50 MCG/MIN: 20 INJECTION INTRAVENOUS at 06:32

## 2024-10-02 RX ADMIN — CLOPIDOGREL BISULFATE 75 MG: 75 TABLET ORAL at 08:22

## 2024-10-02 RX ADMIN — CARVEDILOL 6.25 MG: 6.25 TABLET, FILM COATED ORAL at 08:23

## 2024-10-02 RX ADMIN — HYDROCODONE BITARTRATE AND ACETAMINOPHEN 1 TABLET: 7.5; 325 TABLET ORAL at 04:07

## 2024-10-02 RX ADMIN — Medication 10 ML: at 08:23

## 2024-10-02 RX ADMIN — ASPIRIN 81 MG: 81 TABLET, COATED ORAL at 08:23

## 2024-10-02 RX ADMIN — POTASSIUM CHLORIDE 10 MEQ: 750 CAPSULE, EXTENDED RELEASE ORAL at 08:23

## 2024-10-02 RX ADMIN — EMPAGLIFLOZIN 10 MG: 10 TABLET, FILM COATED ORAL at 08:23

## 2024-10-02 NOTE — PROGRESS NOTES
CTS Progress Note       LOS: 1 day   Patient Care Team:  Edgard Romero MD as PCP - General (Family Medicine)    Chief Complaint: Bilateral carotid artery stenosis    Vital Signs:  Temp:  [96.9 °F (36.1 °C)-97.8 °F (36.6 °C)] 97.5 °F (36.4 °C)  Heart Rate:  [64-86] 82  Resp:  [12-20] 16  BP: ()/(39-92) 122/53  Arterial Line BP: ()/(35-68) 107/40    Physical Exam: Tongue is midline neck is satisfactory no focal neurologic deficits       Results:     Results from last 7 days   Lab Units 10/02/24  0414   WBC 10*3/mm3 9.04   HEMOGLOBIN g/dL 9.6*   HEMATOCRIT % 29.7*   PLATELETS 10*3/mm3 196     Results from last 7 days   Lab Units 10/02/24  0414   SODIUM mmol/L 139   POTASSIUM mmol/L 4.6   CHLORIDE mmol/L 103   CO2 mmol/L 25.0   BUN mg/dL 36*   CREATININE mg/dL 1.66*   GLUCOSE mg/dL 126*   CALCIUM mg/dL 8.5*           Imaging Results (Last 24 Hours)       ** No results found for the last 24 hours. **            Assessment      Bilateral carotid artery stenosis (>70%)    CAD s/p CABG x3 (8/9/24)    ICM / HFrEF (EF 31-35%)    Severe aortic stenosis s/p AVR (8/9/24)    Dyslipidemia    Essential hypertension    GERD (gastroesophageal reflux disease)    Carotid stenosis        Plan   Wean and discontinue Cardene for blood pressure 150 systolic  Discontinue Allen-Webb drain  Discharge home later today with follow-up in my office in 10 to 14 days    Please note that portions of this note were completed with a voice recognition program. Efforts were made to edit the dictations, but occasionally words are mistranscribed.    Anderson Psiano MD  10/02/24  06:48 EDT

## 2024-10-02 NOTE — OP NOTE
Operative Report    Preop Diagnosis: Right sided carotid artery stenosis    Results of STS risk score discussed with patient and family    Postoperative Diagnosis: Same      Procedure: Right internal carotid endarterectomy with pericardial patch closure and EEG monitoring.        Surgeons: Anderson Pisano MD      Assistant: Jose Centeno MD    The Assistant provided services of suctioning, irrigation and retraction.  Also, assisted in suture closure of parts of the skin incision.      Indication: Patient was referred to me with the critical right sided carotid stenosis.  He understood that surgery had with the risk of stroke bleeding infection death and agreed to proceed        Description: Supine position.  Sterile prep and drape and antibiotics given.  General endotracheal anesthesia.  EEG monitoring was employed.  An incision was made on the right neck anterior the sternocleidomastoid and through the platysma.  Care was taken to identify the hypoglossal and vagus nerves and heparin was administered.  The artery was opened there was a heavy calcific plaque with nearly 90 to 95% narrowing.  This was endarterectomized to a smooth surface and brisk backbleeding was noted from the internal carotid.  A pericardial patch was sutured in place with 6-0 Prolene suture and the clamps were then released and the proper sequential fashion and again no deleterious EEG changes were encountered.  A small Allen-Webb drain was placed in the incision irrigated with antibiotic solution closed in multiple layers of Vicryl suture.  Sponge and needle count was reported as correct.  Estimated blood loss less than 50 mL and there was no apparent early complications and the patient was extubated in the operating room      Please note that portions of this note were completed with a voice recognition program. Efforts were made to edit the dictations, but occasionally words are mistranscribed.

## 2024-10-02 NOTE — CASE MANAGEMENT/SOCIAL WORK
Discharge Planning Assessment  [facility]     Patient Name: Preet Edwards  MRN: [id number]  Today's Date: 10/2/2024    Admit Date: 10/1/2024    Plan: Home with spouse   Discharge Needs Assessment       Row Name 10/02/24 1253       Living Environment    People in Home spouse    Name(s) of People in Home Resides with spouse, Praveen Edwards, in [facility]    Current Living Arrangements home    Potentially Unsafe Housing Conditions none    In the past 12 months has the electric, gas, oil, or water company threatened to shut off services in your home? No    Primary Care Provided by self    Provides Primary Care For no one    Caregiving Concerns Reports independent with ADL's    Family Caregiver if Needed spouse    Family Caregiver Names Spouse, Praveen Edwards @ [phone]    Quality of Family Relationships helpful;involved;supportive    Able to Return to Prior Arrangements yes    Living Arrangement Comments Resides in private residence with family       Resource/Environmental Concerns    Resource/Environmental Concerns none    Transportation Concerns none       Transportation Needs    In the past 12 months, has lack of transportation kept you from medical appointments or from getting medications? no    In the past 12 months, has lack of transportation kept you from meetings, work, or from getting things needed for daily living? No       Food Insecurity    Within the past 12 months, you worried that your food would run out before you got the money to buy more. Never true    Within the past 12 months, the food you bought just didn't last and you didn't have money to get more. Never true       Transition Planning    Patient/Family Anticipates Transition to home with family    Patient/Family Anticipated Services at Transition     Transportation Anticipated family or friend will provide       Discharge Needs Assessment    Readmission Within the Last 30 Days no previous admission in last 30  days    Equipment Currently Used at Home none    Concerns to be Addressed no discharge needs identified;denies needs/concerns at this time    Anticipated Changes Related to Illness none    Equipment Needed After Discharge none    Patient's Choice of Community Agency(s) NA    Discharge Coordination/Progress Anticipate home with spouse                   Discharge Plan       Row Name 10/02/24 0282       Plan    Plan Home with spouse    Patient/Family in Agreement with Plan yes  Patient and spouse    Plan Comments Met with patient and spouse at bedside, planning discharge home today.  Patient reports he is independent at home-no home health involvement or DME use.  No current needs at this time, wife to transport.  Patient appreciative of care.  Discussed in unit multidisciplinary rounds this morning.  Moon Boyle, Ext. [phone]    Final Discharge Disposition Code 01 - home or self-care                  Continued Care and Services - Discharged on 10/2/2024 Admission date: 10/1/2024 - Discharge disposition: Home or Self Care   No active coordination exists for this encounter.       Expected Discharge Date and Time       Expected Discharge Date Expected Discharge Time    Oct 2, 2024            Demographic Summary       Row Name 10/02/24 1257       General Information    Admission Type inpatient    Referral Source admission list;interdisciplinary rounds    Reason for Consult discharge planning    Preferred Language English                   Functional Status    No documentation.                  Psychosocial    No documentation.                  Abuse/Neglect    No documentation.                  Legal    No documentation.                  Substance Abuse    No documentation.                  Patient Forms    No documentation.                     JAIRO Franco

## 2024-10-02 NOTE — PLAN OF CARE
Goal Outcome Evaluation:      Pt A&Ox4, no neuro deficits, mild incisional pain- treated per MAR. Nitro maintained at maximum dose, cardene added when necessary to maintain SBP <120. No acute events overnight.

## 2024-10-03 LAB
CYTO UR: NORMAL
LAB AP CASE REPORT: NORMAL
LAB AP CLINICAL INFORMATION: NORMAL
PATH REPORT.FINAL DX SPEC: NORMAL
PATH REPORT.GROSS SPEC: NORMAL

## 2024-10-08 ENCOUNTER — TELEPHONE (OUTPATIENT)
Dept: CARDIAC SURGERY | Facility: CLINIC | Age: 69
End: 2024-10-08
Payer: COMMERCIAL

## 2024-10-08 NOTE — TELEPHONE ENCOUNTER
Pt's wife Praveen called with concerns for her , states that since d/c home from the hosp with CEA on 10/1 that he has had a severe headache for the last four days and that his speech is slurring.  Pt also complained that  his throat is sore and that he is having trouble swallowing. Pt's wife was instructed to bring the pt to the ER and I spoke with aMri BENNETT who confirmed that the pt should report to the ER immediately. Pt's wife said that they live about 1 hour away but she would get her  here this afternoon.

## 2024-10-08 NOTE — DISCHARGE SUMMARY
CTS Discharge Summary    Patient Care Team:  Edgard Romero MD as PCP - General (Family Medicine)  Consults:   Consults       No orders found from 9/2/2024 to 10/2/2024.            Date of Admission: 10/1/2024  8:45 AM  Date of Discharge:  10/2/2024    Discharge Diagnosis  Past Medical History:   Diagnosis Date    Arthritis     COPD (chronic obstructive pulmonary disease)     Coronary artery disease     Diabetes mellitus     patient placed on jardiance following CABG    Full dentures     GERD (gastroesophageal reflux disease)     Heart attack     august 7, 2024    History of transfusion     august 9 2024 with CABG    Hypertension      Patient Active Problem List   Diagnosis    NSTEMI (non-ST elevated myocardial infarction)    CAD s/p CABG x3 (8/9/24)    ICM / HFrEF (EF 31-35%)    Severe aortic stenosis s/p AVR (8/9/24)    Bilateral carotid artery stenosis (>70%)    Former smoker    Dyslipidemia    Essential hypertension    GERD (gastroesophageal reflux disease)    Carotid stenosis       Bilateral carotid artery stenosis [I65.23]  Carotid stenosis [I65.29]     Procedures Performed  Procedure(s):  CAROTID ENDARTERECTOMY WITH EEG RIGHT       History of Present Illness  Patient is a 69 y.o. male s/p CABG with AVR per Dr. Gibson 8/9/2024 following up for bilateral SEPIDEH noted to be greater than 70% per pre-op carotid duplex.  He follows up today with CTA neck imaging.  PMH: Ischemic cardiomyopathy, CAD s/p CABG x 3 (SVG-OM, LIMA-LAD, SVG-diagonal), severe aortic stenosis s/p AVR (23 mm Tapia Inspiris bovine tissue valve), hyperlipidemia, COPD, GERD, former smoker, and bilateral SEPIDEH.  No preoperative CVA/TIA symptoms.  [facility]  and [facility] .  Current medical therapy includes aspirin, Plavix, beta-blocker, and statin therapy.  Last seen in clinic 8/29/2024 recovering well from surgery.  Patient states he gets dizzy when rising quickly from seated position but denies any CVA/TIA symptoms or amaurosis fugax.         Hospital Course  Patient was taken to the operating room on 10/1/24 for right internal carotid endarterectomy with pericardial patch closure and EEG monitoring.  Patient was extubated in the operating room and transported to recovery in stable condition with neuro intact.  POD 1: Weaned off Cardene.  Marco drain and arterial line removed.  Patient ambulating well and no neuro deficits.  Patient met discharge criteria and was discharged to home.        Discharge Medications     Discharge Medications        Continue These Medications        Instructions Start Date   acetaminophen 500 MG tablet  Commonly known as: TYLENOL   1,000 mg, Oral, Daily      albuterol sulfate  (90 Base) MCG/ACT inhaler  Commonly known as: PROVENTIL HFA;VENTOLIN HFA;PROAIR HFA   2 puffs, Inhalation, Every 4 Hours PRN      Anoro Ellipta 62.5-25 MCG/ACT aerosol powder  inhaler  Generic drug: Umeclidinium-Vilanterol   1 puff, Inhalation, As Needed      aspirin 81 MG EC tablet   81 mg, Oral, Daily      carvedilol 6.25 MG tablet  Commonly known as: COREG   6.25 mg, Oral, 2 Times Daily With Meals      clopidogrel 75 MG tablet  Commonly known as: PLAVIX   75 mg, Oral, Daily      empagliflozin 10 MG tablet tablet  Commonly known as: Jardiance   10 mg, Oral, Daily      famotidine 20 MG tablet  Commonly known as: PEPCID   1 tablet, Oral, Every 12 Hours Scheduled      furosemide 40 MG tablet  Commonly known as: LASIX   20 mg, Oral, Daily      HYDROcodone-acetaminophen 5-325 MG per tablet  Commonly known as: NORCO   1 tablet, Oral, Every 6 Hours PRN      levalbuterol 0.63 MG/3ML nebulizer solution  Commonly known as: XOPENEX   1 ampule, Nebulization, Every 6 Hours PRN      potassium chloride 10 MEQ CR tablet   10 mEq, Oral, Daily      rosuvastatin 20 MG tablet  Commonly known as: CRESTOR   20 mg, Oral, Nightly               Discharge Diet:   Diet Instructions       Diet: Cardiac Diets, Diabetic Diets; Healthy Heart (2-3 Na+); Regular (IDDSI 7);  Thin (IDDSI 0); Consistent Carbohydrate      Discharge Diet:  Cardiac Diets  Diabetic Diets       Cardiac Diet: Healthy Heart (2-3 Na+)    Texture: Regular (IDDSI 7)    Fluid Consistency: Thin (IDDSI 0)    Diabetic Diet: Consistent Carbohydrate            Activity at Discharge:   Activity Instructions       Bathing Restrictions      Type of Restriction: Bathing    Bathing Restrictions: No Tub Bath    Driving Restrictions      Type of Restriction: Driving    Driving Restrictions: No Driving Until Next Appointment    Lifting Restrictions      Type of Restriction: Lifting    Lifting Restrictions: Lifting Restriction (Indicate Limit)    Weight Limit (Pounds): 10    Length of Lifting Restriction: until next appointment            Follow-up Appointments  Future Appointments   Date Time Provider Department Center   10/23/2024 10:00 AM Mari Barreto PA-C [facility]   10/23/2024 12:45 PM Hannah Rivera APRN [facility]   11/4/2024  2:30 PM Logan Calix MD [facility]        WOUND CARE: Keep incisions clean and dry at all times. Take a shower daily. Do NOT take a tub bath or submerge yourself in hot tubs, swimming pools, or any other body of water until seen by the cardiac surgeon in your follow-up visit. Clean  your body and incisions daily with Dial soap and water. Always use a clean washcloth. Do not scrub your incision(s). Do not re-use dressings on your incision(s). Do not use any lotions, creams, oils, powders, antibiotic ointment (i.e., Neosporin), peroxide, alcohol, or iodine UNLESS told to do by the cardiac surgeon.  DO NOT remove duke or suture.  Dr. Pisano's office will manage staples/suture.  Call office at [phone] with any questions or concerns.    Anne Marie Sauceda PA-C  10/08/24  12:15 EDT

## 2024-10-23 ENCOUNTER — OFFICE VISIT (OUTPATIENT)
Dept: CARDIOLOGY | Facility: HOSPITAL | Age: 69
End: 2024-10-23
Payer: COMMERCIAL

## 2024-10-23 ENCOUNTER — OFFICE VISIT (OUTPATIENT)
Dept: CARDIAC SURGERY | Facility: CLINIC | Age: 69
End: 2024-10-23
Payer: COMMERCIAL

## 2024-10-23 VITALS
DIASTOLIC BLOOD PRESSURE: 67 MMHG | OXYGEN SATURATION: 98 % | SYSTOLIC BLOOD PRESSURE: 156 MMHG | TEMPERATURE: 98.3 F | RESPIRATION RATE: 18 BRPM | BODY MASS INDEX: 25.03 KG/M2 | WEIGHT: 165.13 LBS | HEART RATE: 73 BPM | HEIGHT: 68 IN

## 2024-10-23 VITALS
BODY MASS INDEX: 25.04 KG/M2 | HEART RATE: 71 BPM | WEIGHT: 165.2 LBS | TEMPERATURE: 97.3 F | DIASTOLIC BLOOD PRESSURE: 68 MMHG | SYSTOLIC BLOOD PRESSURE: 152 MMHG | OXYGEN SATURATION: 98 % | HEIGHT: 68 IN

## 2024-10-23 DIAGNOSIS — I50.22 CHRONIC HFREF (HEART FAILURE WITH REDUCED EJECTION FRACTION): Primary | ICD-10-CM

## 2024-10-23 DIAGNOSIS — I65.23 BILATERAL CAROTID ARTERY STENOSIS: Primary | ICD-10-CM

## 2024-10-23 DIAGNOSIS — E78.5 DYSLIPIDEMIA: ICD-10-CM

## 2024-10-23 DIAGNOSIS — I10 ESSENTIAL HYPERTENSION: ICD-10-CM

## 2024-10-23 DIAGNOSIS — I25.810 CORONARY ARTERY DISEASE INVOLVING CORONARY BYPASS GRAFT OF NATIVE HEART WITHOUT ANGINA PECTORIS: ICD-10-CM

## 2024-10-23 PROCEDURE — 99024 POSTOP FOLLOW-UP VISIT: CPT | Performed by: PHYSICIAN ASSISTANT

## 2024-10-23 RX ORDER — CARVEDILOL 12.5 MG/1
12.5 TABLET ORAL 2 TIMES DAILY
Qty: 60 TABLET | Refills: 11 | Status: SHIPPED | OUTPATIENT
Start: 2024-10-23 | End: 2024-10-25 | Stop reason: SDUPTHER

## 2024-10-23 NOTE — PROGRESS NOTES
[facility] Cardiothoracic Surgery Office Follow Up Note     Date of Encounter: 10/23/2024     Name: Preet Edwards  : 1955     Referred By: No ref. provider found  PCP: Edgard Romero MD    Chief Complaint:    Chief Complaint   Patient presents with    Carotid Artery Disease     Hospital follow up s/p right CEA on 10/1/24       Subjective      History of Present Illness:    Preet Edwards is a 69 y.o. male CAD s/p CABG with AVR on 24 with Dr. Gibson. He underwent right CEA with Dr. Pisano on 10/01/24. His wife called our office on 10/8/24 stating that Mr. Edwards was suffering from headache for 4 days and slurred speech. At that time, he was advised to go to the ED.  Unfortunately the patient did not report to the ED. According to him, the symptoms have all resolved. However, his wife believes his speech is slightly slurred still.  He denies chest pain, shortness of breath, fever/chills, visual changes, balance or weakness issues.   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   He has been taking ASA and Plavix daily. He takes his BP at home twice a day and it is usually in the 130s.       Review of Systems:  Review of Systems   Constitutional: Negative for chills, decreased appetite, diaphoresis, fever, malaise/fatigue, night sweats, weight gain and weight loss.   HENT:  Negative for hoarse voice.    Eyes:  Negative for blurred vision, double vision and visual disturbance.   Cardiovascular:  Negative for chest pain, claudication, dyspnea on exertion, irregular heartbeat, leg swelling, near-syncope, orthopnea, palpitations, paroxysmal nocturnal dyspnea and syncope.   Respiratory:  Negative for cough, hemoptysis, shortness of breath, sputum production and wheezing.    Hematologic/Lymphatic: Negative for adenopathy and bleeding problem. Does not bruise/bleed easily.   Skin:  Negative for color change, nail changes, poor wound healing and rash.    Musculoskeletal:  Negative for back pain, falls and muscle cramps.   Gastrointestinal:  Negative for abdominal pain, dysphagia and heartburn.   Genitourinary:  Negative for flank pain.   Neurological:  Negative for brief paralysis, disturbances in coordination, dizziness, focal weakness, headaches, light-headedness, loss of balance, numbness, paresthesias, sensory change, vertigo and weakness.   Psychiatric/Behavioral:  Negative for depression and suicidal ideas.    Allergic/Immunologic: Negative for persistent infections.       I have reviewed the following portions of the patient's history: problem list, current medications, allergies, past surgical history, past medical history, past social history, past family history, and ROS and confirm it's accurate.    Allergies:  No Known Allergies    Medications:      Current Outpatient Medications:     acetaminophen (TYLENOL) 500 MG tablet, Take 2 tablets by mouth Daily., Disp: , Rfl:     albuterol sulfate  (90 Base) MCG/ACT inhaler, Inhale 2 puffs Every 4 (Four) Hours As Needed for Wheezing., Disp: , Rfl:     Anoro Ellipta 62.5-25 MCG/ACT aerosol powder  inhaler, Inhale 1 puff As Needed., Disp: , Rfl:     aspirin 81 MG EC tablet, Take 1 tablet by mouth Daily., Disp: 90 tablet, Rfl: 1    carvedilol (COREG) 6.25 MG tablet, Take 1 tablet by mouth 2 (Two) Times a Day With Meals., Disp: 60 tablet, Rfl: 11    clopidogrel (PLAVIX) 75 MG tablet, Take 1 tablet by mouth Daily., Disp: , Rfl:     empagliflozin (Jardiance) 10 MG tablet tablet, Take 1 tablet by mouth Daily., Disp: 30 tablet, Rfl: 1    famotidine (PEPCID) 20 MG tablet, Take 1 tablet by mouth Every 12 (Twelve) Hours., Disp: , Rfl:     furosemide (LASIX) 40 MG tablet, Take 0.5 tablets by mouth Daily., Disp: , Rfl:     HYDROcodone-acetaminophen (NORCO) 5-325 MG per tablet, Take 1 tablet by mouth Every 6 (Six) Hours As Needed., Disp: , Rfl:     levalbuterol (XOPENEX) 0.63 MG/3ML nebulizer solution, Take 1 ampule  by nebulization Every 6 (Six) Hours As Needed., Disp: , Rfl:     potassium chloride 10 MEQ CR tablet, Take 1 tablet by mouth Daily., Disp: 30 tablet, Rfl: 11    rosuvastatin (CRESTOR) 20 MG tablet, Take 1 tablet by mouth Every Night., Disp: 30 tablet, Rfl: 11    History:   Past Medical History:   Diagnosis Date    Arthritis     COPD (chronic obstructive pulmonary disease)     Coronary artery disease     Diabetes mellitus     patient placed on jardiance following CABG    Full dentures     GERD (gastroesophageal reflux disease)     Heart attack     2024    History of transfusion     2024 with CABG    Hypertension        Past Surgical History:   Procedure Laterality Date    CARDIAC CATHETERIZATION N/A 2024    Procedure: Left Heart Cath;  Surgeon: Logan Calix MD;  Location:  [facility];  Service: Cardiovascular;  Laterality: N/A;    CAROTID ENDARTERECTOMY Right 10/1/2024    Procedure: CAROTID ENDARTERECTOMY WITH EEG RIGHT;  Surgeon: Anderson Pisano MD;  Location:  [facility];  Service: Vascular;  Laterality: Right;    CATARACT EXTRACTION Bilateral     CORONARY ARTERY BYPASS GRAFT WITH AORTIC VALVE REPAIR/REPLACEMENT N/A 2024    Procedure: CORONARY ARTERY BYPASS X 3 WITH INTERNAL MAMMARY ARTERY GRAFT AND AORTIC VALVE REPAIR/REPLACEMENT, SINDY, EVH;  Surgeon: Gage Gibson MD;  Location:  [facility];  Service: Cardiothoracic;  Laterality: N/A;       Social History     Socioeconomic History    Marital status:     Number of children: 4   Tobacco Use    Smoking status: Former     Current packs/day: 0.00     Types: Cigarettes     Quit date:      Years since quittin.8     Passive exposure: Never    Smokeless tobacco: Never   Vaping Use    Vaping status: Former   Substance and Sexual Activity    Alcohol use: Never    Drug use: Not Currently    Sexual activity: Defer        Family History   Problem Relation Age of Onset    Heart disease Mother     Heart  "attack Mother     Diabetes Mother     Cancer Father     Lung cancer Father     No Known Problems Sister     No Known Problems Sister     Heart attack Brother     Heart disease Brother     Heart attack Brother     Heart disease Brother        Objective     Physical Exam:  Vitals:    10/23/24 0931   BP: 152/68   BP Location: Right arm   Patient Position: Sitting   Pulse: 71   Temp: 97.3 °F (36.3 °C)   SpO2: 98%   Weight: 74.9 kg (165 lb 3.2 oz)   Height: 172.7 cm (68\")      Body mass index is 25.12 kg/m².    Physical Exam  Vitals and nursing note reviewed.   Constitutional:       Appearance: Normal appearance.   HENT:      Head: Normocephalic and atraumatic.   Eyes:      Pupils: Pupils are equal, round, and reactive to light.   Cardiovascular:      Heart sounds: Normal heart sounds.   Pulmonary:      Effort: Pulmonary effort is normal.      Breath sounds: Normal breath sounds.   Abdominal:      General: Abdomen is flat.      Palpations: Abdomen is soft.   Skin:     General: Skin is warm and dry.      Capillary Refill: Capillary refill takes less than 2 seconds.   Neurological:      General: No focal deficit present.      Mental Status: He is alert and oriented to person, place, and time.      Cranial Nerves: Dysarthria present.      Sensory: Sensation is intact.      Motor: Motor function is intact.      Coordination: Coordination is intact.      Gait: Gait is intact.   Psychiatric:         Mood and Affect: Mood normal.         Behavior: Behavior normal.         Imaging/Labs:    Study Result    Narrative & Impression   CT ANGIOGRAM CAROTIDS     Date of Exam: 9/18/2024 8:28 AM EDT     Indication: CAROTIS STENOSIS.     Comparison: None available.     Technique: CTA of the neck was performed before and after the uneventful intravenous administration of 75 mL Isovue-370. Reconstructed coronal and sagittal images were also obtained. In addition, a 3-D volume rendered image was created for   interpretation. Automated " exposure control and iterative reconstruction methods were used.     Findings:  There is a three-vessel aortic arch. The right common carotid artery appears widely patent. There is partially calcified atherosclerotic plaque along the right carotid bifurcation resulting in focal 90% stenosis along the proximal right internal carotid   artery and distally it is widely patent. The left common carotid artery appears widely patent with mild plaque distally. There is atherosclerotic plaque along the left carotid bifurcation resulting in focal 55% stenosis along the proximal left internal   carotid artery and distally it is widely patent. The right vertebral artery is patent with atherosclerotic plaque along its origin and focal moderate stenosis along its V1 segment. The left vertebral artery is patent with several areas of focal mild   stenosis along its V2 and V4 segments. The vertebral arteries are codominant.     IMPRESSION:  Impression:  1.Atherosclerotic plaque along right carotid bifurcation resulting in focal 90% stenosis along proximal right ICA.  2.Atherosclerotic plaque along left carotid bifurcation resulting in focal 55% stenosis along proximal left ICA.        Electronically Signed: Bart Vides MD    9/18/2024 9:46 AM EDT    Workstation ID: [id number]         Assessment / Plan      Assessment / Plan:  There are no diagnoses linked to this encounter.   Right carotid stenosis s/p right CEA with Dr. Pisano on 10/1/24  Incision healing nicely  Blood pressure 152/68 but the patient states usually in 130s, he checks it every day. He sees Dr. Calix today as well  NIHSS score is 1 today, and the patient defers any imaging to evaluate his symptoms from 10/8/24. He prefers to move forward with left CEA as soon as possible  Instructed patient to keep taking ASA, Plavix, and statin daily    Patient Education:  Continue all medications as prescribed  Go to ED with symptoms of TIA/CVA, which we discussed in great  detail today  Cannot approve patient to drive until after left CEA postop     Follow Up:   Schedule for left CEA with Dr. Pisano   Or sooner for any further concerns or worsening sign and symptoms. If unable to reach us in the office please dial 911 or go to the nearest emergency department.      Mari Barreto PA-C   [facility] Cardiothoracic Surgery      Time Spent: I spent 30 minutes caring for Preet on this date of service. This time includes time spent by me in the following activities: preparing for the visit, reviewing tests, obtaining and/or reviewing a separately obtained history, performing a medically appropriate examination and/or evaluation, counseling and educating the patient/family/caregiver, ordering medications, tests, or procedures, referring and communicating with other health care professionals, documenting information in the medical record, independently interpreting results and communicating that information with the patient/family/caregiver, and care coordination.

## 2024-10-23 NOTE — PROGRESS NOTES
"Chief Complaint  Follow-up and Congestive Heart Failure    Subjective    History of Present Illness {CC  Problem List  Visit  Diagnosis   Encounters  Notes  Medications  Labs  Result Review Imaging  Media :23}       History of Present Illness   69 year-old male presents the office today for ongoing evaluation of his CAD and chronic HFrEF.  Echo 8/15/2024 showed EF of 31 to 35% with hypokinesis of the apical anterior, apical lateral and apex.patient presented initially to outlying facility and was found to have ST elevation, and positive troponins in addition to worsening dyspnea. Patient was transferred to [facility] on 8/7 for cardiology evaluation. Patient was taken for emergent left heart catheterization which showed three-vessel CAD. Patient was then recommended to undergo surgical evaluation for multivessel CAD, and AVR. On 8/9, patient underwent CABG x 3 with aortic valve replacement, and EVH. Carotid ultrasound revealed greater than 70% Patient's wife reports that they stopped Entresto due to low blood pressure readings.  Patient notes when blood pressure readings are lower he does experience dizziness and weakness.  Since discontinuation of Entresto blood pressures have been running in the 120s to 130s.  Dizziness and weakness has resolved.  Patient underwent right carotid enterectomy with Dr. Pisano 10/1/2024 and is awaiting the scheduling of the left carotid enterectomy. currently denies cp, dyspnea, dizziness, pedal edema.   Objective     Vital Signs:   Vitals:    10/23/24 1017   BP: 156/67   BP Location: Left arm   Patient Position: Sitting   Cuff Size: Adult   Pulse: 73   Resp: 18   Temp: 98.3 °F (36.8 °C)   TempSrc: Temporal   SpO2: 98%   Weight: 74.9 kg (165 lb 2 oz)   Height: 172.7 cm (68\")     Body mass index is 25.11 kg/m².  Physical Exam  Vitals and nursing note reviewed.   Constitutional:       Appearance: Normal appearance.   HENT:      Head: Normocephalic.   Eyes:      " Pupils: Pupils are equal, round, and reactive to light.   Cardiovascular:      Rate and Rhythm: Normal rate and regular rhythm.      Pulses: Normal pulses.      Heart sounds: Normal heart sounds. No murmur heard.  Pulmonary:      Effort: Pulmonary effort is normal.      Breath sounds: Normal breath sounds.   Abdominal:      General: Bowel sounds are normal.      Palpations: Abdomen is soft.   Musculoskeletal:         General: Normal range of motion.      Cervical back: Normal range of motion.      Right lower leg: No edema.      Left lower leg: No edema.   Skin:     General: Skin is warm and dry.      Capillary Refill: Capillary refill takes less than 2 seconds.      Comments: Midsternal incision well healed   Bilateral evh sites healed   Right carotid site intact with scabs noted   Neurological:      Mental Status: He is alert and oriented to person, place, and time.   Psychiatric:         Mood and Affect: Mood normal.         Thought Content: Thought content normal.              Result Review  Data Reviewed:{ Labs  Result Review  Imaging  Med Tab  Media :23}   Cardiac Catheterization/Vascular Study (08/07/2024 19:56)   Adult Transthoracic Echo Complete W/ Cont if Necessary Per Protocol (08/08/2024 15:18)  Duplex Carotid Ultrasound CAR (08/08/2024 11:26)  Adult Transthoracic Echo Limited W/ Cont if Necessary Per Protocol (08/15/2024 13:59)     Basic Metabolic Panel (08/14/2024 04:25)  CBC & Differential (08/14/2024 04:25)     Assessment and Plan {CC Problem List  Visit Diagnosis  ROS  Review (Popup)  Health Maintenance  Quality  BestPractice  Medications  SmartSets  SnapShot Encounters  Media :23}   1. Chronic HFrEF (heart failure with reduced ejection fraction)  Continue carvedilol, Jardiance   Increase carvedilol to 12.5 mg twice daily  Heart failure education today including signs and symptoms, the role of the heart failure center, daily weights, low sodium diet (less than 1500 mg per day), and  daily physical activity. Reviewed HF Zones with patient and family.  Patient to continue current medications as previously ordered.   2. Coronary artery disease involving native coronary artery of native heart without angina pectoris  S/p cabg   Stable on asa, coreg, plavix, crestor   3. Dyslipidemia  Stable on crestor   4. Essential hypertension   Increase carvedilol to 12.5 mg twice daily and monitor blood pressure closely        Follow Up {Instructions Charge Capture  Follow-up Communications :23}   Return if symptoms worsen or fail to improve.    Patient was given instructions and counseling regarding his condition or for health maintenance advice. Please see specific information pulled into the AVS if appropriate.  Patient was instructed to call the [facility] with any questions, concerns, or worsening symptoms.

## 2024-10-24 ENCOUNTER — TELEPHONE (OUTPATIENT)
Dept: CARDIOLOGY | Facility: HOSPITAL | Age: 69
End: 2024-10-24
Payer: COMMERCIAL

## 2024-10-24 NOTE — TELEPHONE ENCOUNTER
Caller: LARRY HASSAN    Relationship: Emergency Contact    Best call back number: [phone]    What is the best time to reach you: ANYTIME    Who are you requesting to speak with (clinical staff, provider,  specific staff member): TARI TAMAYO        What was the call regarding:  PT WENT TO PCP  AND THEY TOOK HIM OFF OF CARVEDILOL, PRESCRIBED HIM METOPROLOL (MADE CHANGE ON 10/10/24). PT'S WIFE IS WANTING TO KNOW IF PT NEEDS TO TAKE CARVEDILOL INSTEAD OF METOPROLOL. SHOULD HE STOP THE MEDICATION AND START CARVEDILOL, OR CONTINUE TAKING METOPROLOL.       Is it okay if the provider responds through MyChart: PLEASE CALL PT'S WIFE, THANK YOU!

## 2024-10-25 RX ORDER — CARVEDILOL 12.5 MG/1
6.25 TABLET ORAL 2 TIMES DAILY
Start: 2024-10-25

## 2024-10-25 NOTE — TELEPHONE ENCOUNTER
It was brought to my attention that PCP recently stopped carvedilol and initiated metoprolol  tartrate 25 mg twice daily.  Patient to stop metoprolol tartrate and reinitiate carvedilol 6.25 mg twice daily.  Patient to monitor blood pressure closely over the next week and will receive follow-up call from [facility] next week to reassess.  At that time we will consider increasing carvedilol to 12.5 mg twice daily if blood pressure will tolerate it.

## 2024-11-04 ENCOUNTER — TELEPHONE (OUTPATIENT)
Dept: CARDIAC SURGERY | Facility: CLINIC | Age: 69
End: 2024-11-04
Payer: COMMERCIAL

## 2024-11-04 ENCOUNTER — PREP FOR SURGERY (OUTPATIENT)
Dept: CARDIAC SURGERY | Facility: CLINIC | Age: 69
End: 2024-11-04
Payer: COMMERCIAL

## 2024-11-04 DIAGNOSIS — I65.23 BILATERAL CAROTID ARTERY STENOSIS: Primary | ICD-10-CM

## 2024-11-04 DIAGNOSIS — I65.22 STENOSIS OF LEFT CAROTID ARTERY: Primary | ICD-10-CM

## 2024-11-05 ENCOUNTER — PREP FOR SURGERY (OUTPATIENT)
Dept: CARDIAC SURGERY | Facility: CLINIC | Age: 69
End: 2024-11-05
Payer: COMMERCIAL

## 2024-11-05 RX ORDER — CHLORHEXIDINE GLUCONATE 500 MG/1
1 CLOTH TOPICAL EVERY 12 HOURS PRN
Status: CANCELLED | OUTPATIENT
Start: 2024-11-07

## 2024-11-07 ENCOUNTER — ANESTHESIA EVENT (OUTPATIENT)
Dept: PERIOP | Facility: HOSPITAL | Age: 69
DRG: 038 | End: 2024-11-07
Payer: COMMERCIAL

## 2024-11-08 ENCOUNTER — HOSPITAL ENCOUNTER (INPATIENT)
Facility: HOSPITAL | Age: 69
LOS: 1 days | Discharge: HOME | DRG: 038 | End: 2024-11-09
Attending: THORACIC SURGERY (CARDIOTHORACIC VASCULAR SURGERY) | Admitting: THORACIC SURGERY (CARDIOTHORACIC VASCULAR SURGERY)
Payer: COMMERCIAL

## 2024-11-08 ENCOUNTER — ANESTHESIA (OUTPATIENT)
Dept: PERIOP | Facility: HOSPITAL | Age: 69
DRG: 038 | End: 2024-11-08
Payer: COMMERCIAL

## 2024-11-08 ENCOUNTER — ANESTHESIA EVENT CONVERTED (OUTPATIENT)
Dept: ANESTHESIOLOGY | Facility: HOSPITAL | Age: 69
DRG: 038 | End: 2024-11-08
Payer: COMMERCIAL

## 2024-11-08 ENCOUNTER — APPOINTMENT (OUTPATIENT)
Dept: GENERAL RADIOLOGY | Facility: HOSPITAL | Age: 69
DRG: 038 | End: 2024-11-08
Payer: COMMERCIAL

## 2024-11-08 ENCOUNTER — APPOINTMENT (OUTPATIENT)
Dept: NEUROLOGY | Facility: HOSPITAL | Age: 69
DRG: 038 | End: 2024-11-08
Payer: COMMERCIAL

## 2024-11-08 DIAGNOSIS — R13.10 DYSPHAGIA, UNSPECIFIED TYPE: Primary | ICD-10-CM

## 2024-11-08 DIAGNOSIS — I65.22 STENOSIS OF LEFT CAROTID ARTERY: ICD-10-CM

## 2024-11-08 DIAGNOSIS — I65.23 BILATERAL CAROTID ARTERY STENOSIS: ICD-10-CM

## 2024-11-08 LAB
ABO GROUP BLD: NORMAL
ANION GAP SERPL CALCULATED.3IONS-SCNC: 15 MMOL/L (ref 5–15)
BLD GP AB SCN SERPL QL: NEGATIVE
BUN SERPL-MCNC: 24 MG/DL (ref 8–23)
BUN/CREAT SERPL: 14.8 (ref 7–25)
CALCIUM SPEC-SCNC: 8.9 MG/DL (ref 8.6–10.5)
CHLORIDE SERPL-SCNC: 112 MMOL/L (ref 98–107)
CO2 SERPL-SCNC: 24 MMOL/L (ref 22–29)
CREAT SERPL-MCNC: 1.62 MG/DL (ref 0.76–1.27)
DEPRECATED RDW RBC AUTO: 44.4 FL (ref 37–54)
EGFRCR SERPLBLD CKD-EPI 2021: 46.2 ML/MIN/1.73
ERYTHROCYTE [DISTWIDTH] IN BLOOD BY AUTOMATED COUNT: 13.5 % (ref 12.3–15.4)
GLUCOSE BLDC GLUCOMTR-MCNC: 135 MG/DL (ref 70–130)
GLUCOSE BLDC GLUCOMTR-MCNC: 158 MG/DL (ref 70–130)
GLUCOSE BLDC GLUCOMTR-MCNC: 179 MG/DL (ref 70–130)
GLUCOSE SERPL-MCNC: 107 MG/DL (ref 65–99)
HCT VFR BLD AUTO: 37.1 % (ref 37.5–51)
HGB BLD-MCNC: 12.3 G/DL (ref 13–17.7)
INR PPP: 0.97 (ref 0.89–1.12)
MCH RBC QN AUTO: 29.6 PG (ref 26.6–33)
MCHC RBC AUTO-ENTMCNC: 33.2 G/DL (ref 31.5–35.7)
MCV RBC AUTO: 89.4 FL (ref 79–97)
PLATELET # BLD AUTO: 200 10*3/MM3 (ref 140–450)
PMV BLD AUTO: 11.2 FL (ref 6–12)
POTASSIUM SERPL-SCNC: 5 MMOL/L (ref 3.5–5.2)
PROTHROMBIN TIME: 13 SECONDS (ref 12.2–14.5)
RBC # BLD AUTO: 4.15 10*6/MM3 (ref 4.14–5.8)
RH BLD: POSITIVE
SODIUM SERPL-SCNC: 151 MMOL/L (ref 136–145)
T&S EXPIRATION DATE: NORMAL
WBC NRBC COR # BLD AUTO: 9.63 10*3/MM3 (ref 3.4–10.8)

## 2024-11-08 PROCEDURE — 86901 BLOOD TYPING SEROLOGIC RH(D): CPT | Performed by: PHYSICIAN ASSISTANT

## 2024-11-08 PROCEDURE — 03CL0ZZ EXTIRPATION OF MATTER FROM LEFT INTERNAL CAROTID ARTERY, OPEN APPROACH: ICD-10-PCS | Performed by: THORACIC SURGERY (CARDIOTHORACIC VASCULAR SURGERY)

## 2024-11-08 PROCEDURE — 25010000002 PROPOFOL 10 MG/ML EMULSION: Performed by: NURSE ANESTHETIST, CERTIFIED REGISTERED

## 2024-11-08 PROCEDURE — 86900 BLOOD TYPING SEROLOGIC ABO: CPT | Performed by: PHYSICIAN ASSISTANT

## 2024-11-08 PROCEDURE — 25010000002 CEFAZOLIN PER 500 MG: Performed by: THORACIC SURGERY (CARDIOTHORACIC VASCULAR SURGERY)

## 2024-11-08 PROCEDURE — 25010000002 ESMOLOL 100 MG/10ML SOLUTION: Performed by: NURSE ANESTHETIST, CERTIFIED REGISTERED

## 2024-11-08 PROCEDURE — 4A10X4Z MONITORING OF CENTRAL NERVOUS ELECTRICAL ACTIVITY, EXTERNAL APPROACH: ICD-10-PCS | Performed by: THORACIC SURGERY (CARDIOTHORACIC VASCULAR SURGERY)

## 2024-11-08 PROCEDURE — 25010000002 PHENYLEPHRINE 10 MG/ML SOLUTION 1 ML VIAL: Performed by: NURSE ANESTHETIST, CERTIFIED REGISTERED

## 2024-11-08 PROCEDURE — 71045 X-RAY EXAM CHEST 1 VIEW: CPT

## 2024-11-08 PROCEDURE — 35301 RECHANNELING OF ARTERY: CPT | Performed by: PHYSICIAN ASSISTANT

## 2024-11-08 PROCEDURE — 25010000002 LIDOCAINE PF 1% 1 % SOLUTION: Performed by: NURSE ANESTHETIST, CERTIFIED REGISTERED

## 2024-11-08 PROCEDURE — 03UL0KZ SUPPLEMENT LEFT INTERNAL CAROTID ARTERY WITH NONAUTOLOGOUS TISSUE SUBSTITUTE, OPEN APPROACH: ICD-10-PCS | Performed by: THORACIC SURGERY (CARDIOTHORACIC VASCULAR SURGERY)

## 2024-11-08 PROCEDURE — 25010000002 ONDANSETRON PER 1 MG: Performed by: NURSE ANESTHETIST, CERTIFIED REGISTERED

## 2024-11-08 PROCEDURE — 99232 SBSQ HOSP IP/OBS MODERATE 35: CPT

## 2024-11-08 PROCEDURE — 25810000003 SODIUM CHLORIDE 0.9 % SOLUTION 250 ML FLEX CONT: Performed by: NURSE ANESTHETIST, CERTIFIED REGISTERED

## 2024-11-08 PROCEDURE — 25810000003 LACTATED RINGERS PER 1000 ML: Performed by: ANESTHESIOLOGY

## 2024-11-08 PROCEDURE — 25010000002 SUGAMMADEX 200 MG/2ML SOLUTION: Performed by: NURSE ANESTHETIST, CERTIFIED REGISTERED

## 2024-11-08 PROCEDURE — 25010000002 LIDOCAINE PF 1% 1 % SOLUTION: Performed by: ANESTHESIOLOGY

## 2024-11-08 PROCEDURE — 25010000002 DEXAMETHASONE PER 1 MG: Performed by: NURSE ANESTHETIST, CERTIFIED REGISTERED

## 2024-11-08 PROCEDURE — 25010000002 CEFAZOLIN PER 500 MG: Performed by: PHYSICIAN ASSISTANT

## 2024-11-08 PROCEDURE — 25810000003 SODIUM CHLORIDE 0.9 % SOLUTION 250 ML FLEX CONT: Performed by: PHYSICIAN ASSISTANT

## 2024-11-08 PROCEDURE — S0260 H&P FOR SURGERY: HCPCS

## 2024-11-08 PROCEDURE — 88304 TISSUE EXAM BY PATHOLOGIST: CPT | Performed by: THORACIC SURGERY (CARDIOTHORACIC VASCULAR SURGERY)

## 2024-11-08 PROCEDURE — 35301 RECHANNELING OF ARTERY: CPT | Performed by: THORACIC SURGERY (CARDIOTHORACIC VASCULAR SURGERY)

## 2024-11-08 PROCEDURE — 95955 EEG DURING SURGERY: CPT

## 2024-11-08 PROCEDURE — 80048 BASIC METABOLIC PNL TOTAL CA: CPT | Performed by: PHYSICIAN ASSISTANT

## 2024-11-08 PROCEDURE — 25010000002 PROTAMINE SULFATE PER 10 MG: Performed by: NURSE ANESTHETIST, CERTIFIED REGISTERED

## 2024-11-08 PROCEDURE — S0260 H&P FOR SURGERY: HCPCS | Performed by: THORACIC SURGERY (CARDIOTHORACIC VASCULAR SURGERY)

## 2024-11-08 PROCEDURE — 94799 UNLISTED PULMONARY SVC/PX: CPT

## 2024-11-08 PROCEDURE — 85027 COMPLETE CBC AUTOMATED: CPT | Performed by: PHYSICIAN ASSISTANT

## 2024-11-08 PROCEDURE — 94640 AIRWAY INHALATION TREATMENT: CPT

## 2024-11-08 PROCEDURE — 25010000002 HEPARIN (PORCINE) PER 1000 UNITS: Performed by: NURSE ANESTHETIST, CERTIFIED REGISTERED

## 2024-11-08 PROCEDURE — 25010000002 ONDANSETRON PER 1 MG

## 2024-11-08 PROCEDURE — 25010000002 PROCHLORPERAZINE 10 MG/2ML SOLUTION

## 2024-11-08 PROCEDURE — 86850 RBC ANTIBODY SCREEN: CPT | Performed by: PHYSICIAN ASSISTANT

## 2024-11-08 PROCEDURE — C1768 GRAFT, VASCULAR: HCPCS | Performed by: THORACIC SURGERY (CARDIOTHORACIC VASCULAR SURGERY)

## 2024-11-08 PROCEDURE — 25010000002 NICARDIPINE 2.5 MG/ML SOLUTION: Performed by: NURSE ANESTHETIST, CERTIFIED REGISTERED

## 2024-11-08 PROCEDURE — 82948 REAGENT STRIP/BLOOD GLUCOSE: CPT

## 2024-11-08 PROCEDURE — 25010000002 NICARDIPINE 2.5 MG/ML SOLUTION 10 ML VIAL: Performed by: NURSE ANESTHETIST, CERTIFIED REGISTERED

## 2024-11-08 PROCEDURE — 95955 EEG DURING SURGERY: CPT | Performed by: PSYCHIATRY & NEUROLOGY

## 2024-11-08 PROCEDURE — 25010000002 FENTANYL CITRATE (PF) 100 MCG/2ML SOLUTION: Performed by: NURSE ANESTHETIST, CERTIFIED REGISTERED

## 2024-11-08 PROCEDURE — 85610 PROTHROMBIN TIME: CPT | Performed by: PHYSICIAN ASSISTANT

## 2024-11-08 PROCEDURE — 88311 DECALCIFY TISSUE: CPT | Performed by: THORACIC SURGERY (CARDIOTHORACIC VASCULAR SURGERY)

## 2024-11-08 PROCEDURE — 25010000002 NICARDIPINE 2.5 MG/ML SOLUTION 10 ML VIAL: Performed by: PHYSICIAN ASSISTANT

## 2024-11-08 PROCEDURE — 25010000002 GENTAMICIN PER 80 MG: Performed by: THORACIC SURGERY (CARDIOTHORACIC VASCULAR SURGERY)

## 2024-11-08 PROCEDURE — 25010000002 HEPARIN (PORCINE) PER 1000 UNITS: Performed by: THORACIC SURGERY (CARDIOTHORACIC VASCULAR SURGERY)

## 2024-11-08 DEVICE — SEALANT HEMO TACHOSIL FIBRIN PTCH 9.5X4.8CM: Type: IMPLANTABLE DEVICE | Site: CAROTID | Status: FUNCTIONAL

## 2024-11-08 DEVICE — VASCU-GUARD IS PREPARED FROM BOVINE PERICARDIUM CROSS-LINKED WITH GLUTARALDEHYDE, AND TREATED WITH 1 MOLAR SODIUM HYDROXIDE FOR A MINIMUM OF 60 MINUTES AT 20-25 DEGREES C. VASCU-GUARD IS TERMINALLY STERILIZED USING GAMMA IRRADIATION. AND PACKAGED WITHIN A DOUBLE-POUCH SYSTEM. THE CONTENTS OF THE UNOPENED, UNDAMAGED OUTER POUCH ARE STERILE.
Type: IMPLANTABLE DEVICE | Site: CAROTID | Status: FUNCTIONAL
Brand: VASCU-GUARD

## 2024-11-08 RX ORDER — DROPERIDOL 2.5 MG/ML
0.62 INJECTION, SOLUTION INTRAMUSCULAR; INTRAVENOUS
Status: DISCONTINUED | OUTPATIENT
Start: 2024-11-08 | End: 2024-11-08 | Stop reason: HOSPADM

## 2024-11-08 RX ORDER — ROSUVASTATIN CALCIUM 20 MG/1
20 TABLET, COATED ORAL NIGHTLY
Status: DISCONTINUED | OUTPATIENT
Start: 2024-11-08 | End: 2024-11-09 | Stop reason: HOSPADM

## 2024-11-08 RX ORDER — PROMETHAZINE HYDROCHLORIDE 25 MG/1
25 SUPPOSITORY RECTAL ONCE AS NEEDED
Status: DISCONTINUED | OUTPATIENT
Start: 2024-11-08 | End: 2024-11-08 | Stop reason: HOSPADM

## 2024-11-08 RX ORDER — SODIUM CHLORIDE 9 MG/ML
40 INJECTION, SOLUTION INTRAVENOUS AS NEEDED
Status: DISCONTINUED | OUTPATIENT
Start: 2024-11-08 | End: 2024-11-09 | Stop reason: HOSPADM

## 2024-11-08 RX ORDER — MEPERIDINE HYDROCHLORIDE 25 MG/ML
12.5 INJECTION INTRAMUSCULAR; INTRAVENOUS; SUBCUTANEOUS
Status: DISCONTINUED | OUTPATIENT
Start: 2024-11-08 | End: 2024-11-08 | Stop reason: HOSPADM

## 2024-11-08 RX ORDER — FUROSEMIDE 20 MG/1
20 TABLET ORAL DAILY
Status: DISCONTINUED | OUTPATIENT
Start: 2024-11-08 | End: 2024-11-09 | Stop reason: HOSPADM

## 2024-11-08 RX ORDER — FENTANYL CITRATE 50 UG/ML
INJECTION, SOLUTION INTRAMUSCULAR; INTRAVENOUS AS NEEDED
Status: DISCONTINUED | OUTPATIENT
Start: 2024-11-08 | End: 2024-11-08 | Stop reason: SURG

## 2024-11-08 RX ORDER — CHLORHEXIDINE GLUCONATE 500 MG/1
1 CLOTH TOPICAL EVERY 12 HOURS PRN
Status: DISCONTINUED | OUTPATIENT
Start: 2024-11-08 | End: 2024-11-08 | Stop reason: HOSPADM

## 2024-11-08 RX ORDER — FAMOTIDINE 20 MG/1
20 TABLET, FILM COATED ORAL EVERY 12 HOURS SCHEDULED
Status: DISCONTINUED | OUTPATIENT
Start: 2024-11-08 | End: 2024-11-09 | Stop reason: HOSPADM

## 2024-11-08 RX ORDER — SODIUM CHLORIDE 450 MG/100ML
30 INJECTION, SOLUTION INTRAVENOUS CONTINUOUS
Status: ACTIVE | OUTPATIENT
Start: 2024-11-08 | End: 2024-11-08

## 2024-11-08 RX ORDER — HYDROCODONE BITARTRATE AND ACETAMINOPHEN 5; 325 MG/1; MG/1
1 TABLET ORAL EVERY 6 HOURS PRN
Status: DISCONTINUED | OUTPATIENT
Start: 2024-11-08 | End: 2024-11-09 | Stop reason: HOSPADM

## 2024-11-08 RX ORDER — POTASSIUM CHLORIDE 750 MG/1
10 CAPSULE, EXTENDED RELEASE ORAL DAILY
Status: DISCONTINUED | OUTPATIENT
Start: 2024-11-09 | End: 2024-11-09 | Stop reason: HOSPADM

## 2024-11-08 RX ORDER — NALOXONE HCL 0.4 MG/ML
0.4 VIAL (ML) INJECTION AS NEEDED
Status: DISCONTINUED | OUTPATIENT
Start: 2024-11-08 | End: 2024-11-08 | Stop reason: HOSPADM

## 2024-11-08 RX ORDER — PROTAMINE SULFATE 10 MG/ML
INJECTION, SOLUTION INTRAVENOUS AS NEEDED
Status: DISCONTINUED | OUTPATIENT
Start: 2024-11-08 | End: 2024-11-08 | Stop reason: SURG

## 2024-11-08 RX ORDER — PROPOFOL 10 MG/ML
VIAL (ML) INTRAVENOUS AS NEEDED
Status: DISCONTINUED | OUTPATIENT
Start: 2024-11-08 | End: 2024-11-08 | Stop reason: SURG

## 2024-11-08 RX ORDER — SODIUM CHLORIDE 0.9 % (FLUSH) 0.9 %
10 SYRINGE (ML) INJECTION AS NEEDED
Status: DISCONTINUED | OUTPATIENT
Start: 2024-11-08 | End: 2024-11-08 | Stop reason: HOSPADM

## 2024-11-08 RX ORDER — SODIUM CHLORIDE, SODIUM LACTATE, POTASSIUM CHLORIDE, CALCIUM CHLORIDE 600; 310; 30; 20 MG/100ML; MG/100ML; MG/100ML; MG/100ML
9 INJECTION, SOLUTION INTRAVENOUS CONTINUOUS
Status: ACTIVE | OUTPATIENT
Start: 2024-11-09 | End: 2024-11-09

## 2024-11-08 RX ORDER — FENTANYL CITRATE 50 UG/ML
50 INJECTION, SOLUTION INTRAMUSCULAR; INTRAVENOUS
Status: DISCONTINUED | OUTPATIENT
Start: 2024-11-08 | End: 2024-11-08 | Stop reason: HOSPADM

## 2024-11-08 RX ORDER — SODIUM CHLORIDE, SODIUM LACTATE, POTASSIUM CHLORIDE, CALCIUM CHLORIDE 600; 310; 30; 20 MG/100ML; MG/100ML; MG/100ML; MG/100ML
9 INJECTION, SOLUTION INTRAVENOUS CONTINUOUS
Status: DISCONTINUED | OUTPATIENT
Start: 2024-11-08 | End: 2024-11-09 | Stop reason: HOSPADM

## 2024-11-08 RX ORDER — LABETALOL HYDROCHLORIDE 5 MG/ML
5 INJECTION, SOLUTION INTRAVENOUS
Status: DISCONTINUED | OUTPATIENT
Start: 2024-11-08 | End: 2024-11-08 | Stop reason: HOSPADM

## 2024-11-08 RX ORDER — BUPIVACAINE HCL/0.9 % NACL/PF 0.125 %
PLASTIC BAG, INJECTION (ML) EPIDURAL AS NEEDED
Status: DISCONTINUED | OUTPATIENT
Start: 2024-11-08 | End: 2024-11-08 | Stop reason: SURG

## 2024-11-08 RX ORDER — LIDOCAINE HYDROCHLORIDE 10 MG/ML
0.5 INJECTION, SOLUTION EPIDURAL; INFILTRATION; INTRACAUDAL; PERINEURAL ONCE AS NEEDED
Status: COMPLETED | OUTPATIENT
Start: 2024-11-08 | End: 2024-11-08

## 2024-11-08 RX ORDER — ESMOLOL HYDROCHLORIDE 10 MG/ML
INJECTION INTRAVENOUS AS NEEDED
Status: DISCONTINUED | OUTPATIENT
Start: 2024-11-08 | End: 2024-11-08 | Stop reason: SURG

## 2024-11-08 RX ORDER — LIDOCAINE HYDROCHLORIDE 10 MG/ML
INJECTION, SOLUTION EPIDURAL; INFILTRATION; INTRACAUDAL; PERINEURAL AS NEEDED
Status: DISCONTINUED | OUTPATIENT
Start: 2024-11-08 | End: 2024-11-08 | Stop reason: SURG

## 2024-11-08 RX ORDER — ONDANSETRON 2 MG/ML
INJECTION INTRAMUSCULAR; INTRAVENOUS
Status: COMPLETED
Start: 2024-11-08 | End: 2024-11-08

## 2024-11-08 RX ORDER — ACETAMINOPHEN 325 MG/1
650 TABLET ORAL EVERY 8 HOURS PRN
Status: DISCONTINUED | OUTPATIENT
Start: 2024-11-08 | End: 2024-11-09 | Stop reason: HOSPADM

## 2024-11-08 RX ORDER — SODIUM CHLORIDE 0.9 % (FLUSH) 0.9 %
10 SYRINGE (ML) INJECTION EVERY 12 HOURS SCHEDULED
Status: DISCONTINUED | OUTPATIENT
Start: 2024-11-08 | End: 2024-11-08 | Stop reason: HOSPADM

## 2024-11-08 RX ORDER — FAMOTIDINE 20 MG/1
20 TABLET, FILM COATED ORAL EVERY 12 HOURS SCHEDULED
Status: DISCONTINUED | OUTPATIENT
Start: 2024-11-08 | End: 2024-11-08

## 2024-11-08 RX ORDER — PROMETHAZINE HYDROCHLORIDE 25 MG/1
25 TABLET ORAL ONCE AS NEEDED
Status: DISCONTINUED | OUTPATIENT
Start: 2024-11-08 | End: 2024-11-08 | Stop reason: HOSPADM

## 2024-11-08 RX ORDER — ONDANSETRON 2 MG/ML
INJECTION INTRAMUSCULAR; INTRAVENOUS AS NEEDED
Status: DISCONTINUED | OUTPATIENT
Start: 2024-11-08 | End: 2024-11-08 | Stop reason: SURG

## 2024-11-08 RX ORDER — MORPHINE SULFATE 2 MG/ML
2 INJECTION, SOLUTION INTRAMUSCULAR; INTRAVENOUS EVERY 4 HOURS PRN
Status: ACTIVE | OUTPATIENT
Start: 2024-11-08 | End: 2024-11-09

## 2024-11-08 RX ORDER — CARVEDILOL 6.25 MG/1
6.25 TABLET ORAL 2 TIMES DAILY
Status: DISCONTINUED | OUTPATIENT
Start: 2024-11-08 | End: 2024-11-09 | Stop reason: HOSPADM

## 2024-11-08 RX ORDER — SODIUM CHLORIDE 0.9 % (FLUSH) 0.9 %
10 SYRINGE (ML) INJECTION EVERY 12 HOURS SCHEDULED
Status: DISCONTINUED | OUTPATIENT
Start: 2024-11-08 | End: 2024-11-09 | Stop reason: HOSPADM

## 2024-11-08 RX ORDER — DROPERIDOL 2.5 MG/ML
0.62 INJECTION, SOLUTION INTRAMUSCULAR; INTRAVENOUS ONCE AS NEEDED
Status: DISCONTINUED | OUTPATIENT
Start: 2024-11-08 | End: 2024-11-08 | Stop reason: HOSPADM

## 2024-11-08 RX ORDER — HYDROMORPHONE HYDROCHLORIDE 1 MG/ML
0.5 INJECTION, SOLUTION INTRAMUSCULAR; INTRAVENOUS; SUBCUTANEOUS
Status: DISCONTINUED | OUTPATIENT
Start: 2024-11-08 | End: 2024-11-08 | Stop reason: HOSPADM

## 2024-11-08 RX ORDER — HEPARIN SODIUM 1000 [USP'U]/ML
INJECTION, SOLUTION INTRAVENOUS; SUBCUTANEOUS AS NEEDED
Status: DISCONTINUED | OUTPATIENT
Start: 2024-11-08 | End: 2024-11-08 | Stop reason: SURG

## 2024-11-08 RX ORDER — PROCHLORPERAZINE EDISYLATE 5 MG/ML
5 INJECTION INTRAMUSCULAR; INTRAVENOUS EVERY 6 HOURS PRN
Status: DISCONTINUED | OUTPATIENT
Start: 2024-11-08 | End: 2024-11-09 | Stop reason: HOSPADM

## 2024-11-08 RX ORDER — CEFAZOLIN SODIUM 2 G/100ML
2000 INJECTION, SOLUTION INTRAVENOUS EVERY 8 HOURS
Status: DISCONTINUED | OUTPATIENT
Start: 2024-11-08 | End: 2024-11-08

## 2024-11-08 RX ORDER — IPRATROPIUM BROMIDE AND ALBUTEROL SULFATE 2.5; .5 MG/3ML; MG/3ML
3 SOLUTION RESPIRATORY (INHALATION) ONCE AS NEEDED
Status: DISCONTINUED | OUTPATIENT
Start: 2024-11-08 | End: 2024-11-08 | Stop reason: HOSPADM

## 2024-11-08 RX ORDER — MIDAZOLAM HYDROCHLORIDE 1 MG/ML
0.5 INJECTION, SOLUTION INTRAMUSCULAR; INTRAVENOUS
Status: DISCONTINUED | OUTPATIENT
Start: 2024-11-08 | End: 2024-11-08 | Stop reason: HOSPADM

## 2024-11-08 RX ORDER — ALBUTEROL SULFATE 90 UG/1
2 INHALANT RESPIRATORY (INHALATION) EVERY 4 HOURS PRN
Status: DISCONTINUED | OUTPATIENT
Start: 2024-11-08 | End: 2024-11-09 | Stop reason: HOSPADM

## 2024-11-08 RX ORDER — ONDANSETRON 2 MG/ML
4 INJECTION INTRAMUSCULAR; INTRAVENOUS ONCE AS NEEDED
Status: COMPLETED | OUTPATIENT
Start: 2024-11-08 | End: 2024-11-08

## 2024-11-08 RX ORDER — ONDANSETRON 2 MG/ML
4 INJECTION INTRAMUSCULAR; INTRAVENOUS EVERY 6 HOURS PRN
Status: DISCONTINUED | OUTPATIENT
Start: 2024-11-08 | End: 2024-11-09 | Stop reason: HOSPADM

## 2024-11-08 RX ORDER — FAMOTIDINE 10 MG/ML
20 INJECTION, SOLUTION INTRAVENOUS ONCE
Status: DISCONTINUED | OUTPATIENT
Start: 2024-11-08 | End: 2024-11-08 | Stop reason: HOSPADM

## 2024-11-08 RX ORDER — SODIUM CHLORIDE 0.9 % (FLUSH) 0.9 %
10 SYRINGE (ML) INJECTION AS NEEDED
Status: DISCONTINUED | OUTPATIENT
Start: 2024-11-08 | End: 2024-11-09 | Stop reason: HOSPADM

## 2024-11-08 RX ORDER — HYDROCODONE BITARTRATE AND ACETAMINOPHEN 5; 325 MG/1; MG/1
1 TABLET ORAL ONCE AS NEEDED
Status: DISCONTINUED | OUTPATIENT
Start: 2024-11-08 | End: 2024-11-08 | Stop reason: HOSPADM

## 2024-11-08 RX ORDER — CLOPIDOGREL BISULFATE 75 MG/1
75 TABLET ORAL DAILY
Status: DISCONTINUED | OUTPATIENT
Start: 2024-11-09 | End: 2024-11-09 | Stop reason: HOSPADM

## 2024-11-08 RX ORDER — SODIUM CHLORIDE 9 MG/ML
9 INJECTION, SOLUTION INTRAVENOUS AS NEEDED
Status: ACTIVE | OUTPATIENT
Start: 2024-11-08 | End: 2024-11-09

## 2024-11-08 RX ORDER — HYDRALAZINE HYDROCHLORIDE 20 MG/ML
5 INJECTION INTRAMUSCULAR; INTRAVENOUS
Status: DISCONTINUED | OUTPATIENT
Start: 2024-11-08 | End: 2024-11-08 | Stop reason: HOSPADM

## 2024-11-08 RX ORDER — SODIUM CHLORIDE 0.9 % (FLUSH) 0.9 %
3 SYRINGE (ML) INJECTION EVERY 12 HOURS SCHEDULED
Status: DISCONTINUED | OUTPATIENT
Start: 2024-11-08 | End: 2024-11-09 | Stop reason: HOSPADM

## 2024-11-08 RX ORDER — ARFORMOTEROL TARTRATE 15 UG/2ML
15 SOLUTION RESPIRATORY (INHALATION)
Status: DISCONTINUED | OUTPATIENT
Start: 2024-11-08 | End: 2024-11-09 | Stop reason: HOSPADM

## 2024-11-08 RX ORDER — FAMOTIDINE 20 MG/1
20 TABLET, FILM COATED ORAL ONCE
Status: COMPLETED | OUTPATIENT
Start: 2024-11-08 | End: 2024-11-08

## 2024-11-08 RX ORDER — VECURONIUM BROMIDE 1 MG/ML
INJECTION, POWDER, LYOPHILIZED, FOR SOLUTION INTRAVENOUS AS NEEDED
Status: DISCONTINUED | OUTPATIENT
Start: 2024-11-08 | End: 2024-11-08 | Stop reason: SURG

## 2024-11-08 RX ORDER — SODIUM CHLORIDE 0.9 % (FLUSH) 0.9 %
3-10 SYRINGE (ML) INJECTION AS NEEDED
Status: DISCONTINUED | OUTPATIENT
Start: 2024-11-08 | End: 2024-11-09 | Stop reason: HOSPADM

## 2024-11-08 RX ORDER — DEXAMETHASONE SODIUM PHOSPHATE 4 MG/ML
INJECTION, SOLUTION INTRA-ARTICULAR; INTRALESIONAL; INTRAMUSCULAR; INTRAVENOUS; SOFT TISSUE AS NEEDED
Status: DISCONTINUED | OUTPATIENT
Start: 2024-11-08 | End: 2024-11-08 | Stop reason: SURG

## 2024-11-08 RX ORDER — ASPIRIN 81 MG/1
81 TABLET ORAL DAILY
Status: DISCONTINUED | OUTPATIENT
Start: 2024-11-08 | End: 2024-11-09 | Stop reason: HOSPADM

## 2024-11-08 RX ORDER — NICARDIPINE HYDROCHLORIDE 2.5 MG/ML
INJECTION INTRAVENOUS AS NEEDED
Status: DISCONTINUED | OUTPATIENT
Start: 2024-11-08 | End: 2024-11-08 | Stop reason: SURG

## 2024-11-08 RX ADMIN — Medication 200 MCG: at 08:38

## 2024-11-08 RX ADMIN — CARVEDILOL 6.25 MG: 6.25 TABLET, FILM COATED ORAL at 12:16

## 2024-11-08 RX ADMIN — PROTAMINE SULFATE 50 MG: 10 INJECTION, SOLUTION INTRAVENOUS at 08:25

## 2024-11-08 RX ADMIN — Medication 10 ML: at 20:08

## 2024-11-08 RX ADMIN — Medication 100 MCG: at 07:58

## 2024-11-08 RX ADMIN — SODIUM CHLORIDE 30 ML/HR: 4.5 INJECTION, SOLUTION INTRAVENOUS at 11:18

## 2024-11-08 RX ADMIN — NICARDIPINE HYDROCHLORIDE 10 MG/HR: 25 INJECTION, SOLUTION INTRAVENOUS at 23:10

## 2024-11-08 RX ADMIN — HYDROCODONE BITARTRATE AND ACETAMINOPHEN 1 TABLET: 5; 325 TABLET ORAL at 11:40

## 2024-11-08 RX ADMIN — PHENYLEPHRINE HYDROCHLORIDE 0.5 MCG/KG/MIN: 10 INJECTION INTRAVENOUS at 07:22

## 2024-11-08 RX ADMIN — NICARDIPINE HYDROCHLORIDE 10 MG/HR: 25 INJECTION, SOLUTION INTRAVENOUS at 15:35

## 2024-11-08 RX ADMIN — PROCHLORPERAZINE EDISYLATE 5 MG: 5 INJECTION INTRAMUSCULAR; INTRAVENOUS at 11:08

## 2024-11-08 RX ADMIN — Medication 3 ML: at 14:43

## 2024-11-08 RX ADMIN — FUROSEMIDE 20 MG: 20 TABLET ORAL at 12:16

## 2024-11-08 RX ADMIN — NICARDIPINE HYDROCHLORIDE 10 MG/HR: 25 INJECTION, SOLUTION INTRAVENOUS at 18:18

## 2024-11-08 RX ADMIN — Medication 100 MCG: at 07:19

## 2024-11-08 RX ADMIN — HYDROCODONE BITARTRATE AND ACETAMINOPHEN 1 TABLET: 5; 325 TABLET ORAL at 18:18

## 2024-11-08 RX ADMIN — ASPIRIN 81 MG: 81 TABLET, COATED ORAL at 14:41

## 2024-11-08 RX ADMIN — ESMOLOL HYDROCHLORIDE 80 MG: 10 INJECTION, SOLUTION INTRAVENOUS at 07:17

## 2024-11-08 RX ADMIN — EMPAGLIFLOZIN 10 MG: 10 TABLET, FILM COATED ORAL at 14:41

## 2024-11-08 RX ADMIN — ONDANSETRON 4 MG: 2 INJECTION INTRAMUSCULAR; INTRAVENOUS at 10:05

## 2024-11-08 RX ADMIN — Medication 200 MCG: at 07:22

## 2024-11-08 RX ADMIN — VECURONIUM BROMIDE 8 MG: 1 INJECTION, POWDER, LYOPHILIZED, FOR SOLUTION INTRAVENOUS at 07:17

## 2024-11-08 RX ADMIN — NICARDIPINE HYDROCHLORIDE 400 MCG: 25 INJECTION INTRAVENOUS at 07:43

## 2024-11-08 RX ADMIN — SUGAMMADEX 200 MG: 100 INJECTION, SOLUTION INTRAVENOUS at 08:38

## 2024-11-08 RX ADMIN — FAMOTIDINE 20 MG: 20 TABLET, FILM COATED ORAL at 06:56

## 2024-11-08 RX ADMIN — Medication 100 MCG: at 08:28

## 2024-11-08 RX ADMIN — NICARDIPINE HYDROCHLORIDE 5 MG/HR: 25 INJECTION, SOLUTION INTRAVENOUS at 07:43

## 2024-11-08 RX ADMIN — NICARDIPINE HYDROCHLORIDE 300 MCG: 25 INJECTION INTRAVENOUS at 08:16

## 2024-11-08 RX ADMIN — SODIUM CHLORIDE, SODIUM LACTATE, POTASSIUM CHLORIDE, CALCIUM CHLORIDE 9 ML/HR: 20; 30; 600; 310 INJECTION, SOLUTION INTRAVENOUS at 06:56

## 2024-11-08 RX ADMIN — NICARDIPINE HYDROCHLORIDE 400 MCG: 25 INJECTION INTRAVENOUS at 07:45

## 2024-11-08 RX ADMIN — HEPARIN SODIUM 7000 UNITS: 1000 INJECTION INTRAVENOUS; SUBCUTANEOUS at 07:44

## 2024-11-08 RX ADMIN — ARFORMOTEROL TARTRATE 15 MCG: 15 SOLUTION RESPIRATORY (INHALATION) at 19:53

## 2024-11-08 RX ADMIN — DEXAMETHASONE SODIUM PHOSPHATE 4 MG: 4 INJECTION, SOLUTION INTRAMUSCULAR; INTRAVENOUS at 07:26

## 2024-11-08 RX ADMIN — NICARDIPINE HYDROCHLORIDE 12.5 MG/HR: 25 INJECTION, SOLUTION INTRAVENOUS at 20:41

## 2024-11-08 RX ADMIN — ONDANSETRON 4 MG: 2 INJECTION INTRAMUSCULAR; INTRAVENOUS at 08:14

## 2024-11-08 RX ADMIN — CARVEDILOL 6.25 MG: 6.25 TABLET, FILM COATED ORAL at 20:05

## 2024-11-08 RX ADMIN — Medication 3 ML: at 20:08

## 2024-11-08 RX ADMIN — SODIUM CHLORIDE 2000 MG: 900 INJECTION INTRAVENOUS at 15:21

## 2024-11-08 RX ADMIN — FAMOTIDINE 20 MG: 20 TABLET, FILM COATED ORAL at 20:05

## 2024-11-08 RX ADMIN — Medication 100 MCG: at 07:56

## 2024-11-08 RX ADMIN — SODIUM CHLORIDE 2 G: 900 INJECTION INTRAVENOUS at 07:20

## 2024-11-08 RX ADMIN — LIDOCAINE HYDROCHLORIDE 50 MG: 10 INJECTION, SOLUTION EPIDURAL; INFILTRATION; INTRACAUDAL; PERINEURAL at 07:17

## 2024-11-08 RX ADMIN — NICARDIPINE HYDROCHLORIDE 10 MG/HR: 25 INJECTION, SOLUTION INTRAVENOUS at 11:50

## 2024-11-08 RX ADMIN — MUPIROCIN 1 APPLICATION: 20 OINTMENT TOPICAL at 20:08

## 2024-11-08 RX ADMIN — ROSUVASTATIN 20 MG: 20 TABLET, FILM COATED ORAL at 20:05

## 2024-11-08 RX ADMIN — Medication 200 MCG: at 07:24

## 2024-11-08 RX ADMIN — Medication 200 MCG: at 07:30

## 2024-11-08 RX ADMIN — Medication 10 ML: at 14:44

## 2024-11-08 RX ADMIN — LIDOCAINE HYDROCHLORIDE 0.5 ML: 10 INJECTION, SOLUTION EPIDURAL; INFILTRATION; INTRACAUDAL; PERINEURAL at 06:56

## 2024-11-08 RX ADMIN — FENTANYL CITRATE 100 MCG: 50 INJECTION, SOLUTION INTRAMUSCULAR; INTRAVENOUS at 07:40

## 2024-11-08 RX ADMIN — SODIUM CHLORIDE 2000 MG: 900 INJECTION INTRAVENOUS at 22:29

## 2024-11-08 RX ADMIN — PROPOFOL 150 MG: 10 INJECTION, EMULSION INTRAVENOUS at 07:17

## 2024-11-08 NOTE — H&P
"Pre-Op H&P  Preet Edwards  [id number]  1955    Chief complaint: \" I am here to have left carotid surgery.\"    HPI:    Patient is a 69 y.o.male who presents with bilateral carotid artery stenosis (greater than 70%).  He presents today for scheduled surgery and anticipates a left carotid endarterectomy/EEG-LEFT.  Patient reports he takes Plavix and states his last dose was Monday, 11/4/2024.  He denies any symptomatic changes or recent illnesses since last office visit.  He recently underwent right carotid endarterectomy.    Review of Systems:  General ROS: negative for chills, fever or skin lesions;  No changes since last office visit.  Neg for recent sick exposure  Cardiovascular ROS: Positive for dyspnea on exertion at baseline.  Respiratory ROS: Positive for shortness of breath at baseline.  Denies cough.    Allergies: Denies allergy to latex or contrast dye.  No Known Allergies    Home Meds:    No current facility-administered medications on file prior to encounter.     Current Outpatient Medications on File Prior to Encounter   Medication Sig Dispense Refill    acetaminophen (TYLENOL) 500 MG tablet Take 2 tablets by mouth Daily.      aspirin 81 MG EC tablet Take 1 tablet by mouth Daily. 90 tablet 1    carvedilol (COREG) 12.5 MG tablet Take 0.5 tablets by mouth 2 (Two) Times a Day.      clopidogrel (PLAVIX) 75 MG tablet Take 1 tablet by mouth Daily.      famotidine (PEPCID) 20 MG tablet Take 1 tablet by mouth Every 12 (Twelve) Hours.      furosemide (LASIX) 40 MG tablet Take 0.5 tablets by mouth Daily.      potassium chloride 10 MEQ CR tablet Take 1 tablet by mouth Daily. 30 tablet 11    rosuvastatin (CRESTOR) 20 MG tablet Take 1 tablet by mouth Every Night. 30 tablet 11    albuterol sulfate  (90 Base) MCG/ACT inhaler Inhale 2 puffs Every 4 (Four) Hours As Needed for Wheezing.      Anoro Ellipta 62.5-25 MCG/ACT aerosol powder  inhaler Inhale 1 puff As Needed.      empagliflozin (Jardiance) 10 MG " tablet tablet Take 1 tablet by mouth Daily. 30 tablet 1    HYDROcodone-acetaminophen (NORCO) 5-325 MG per tablet Take 1 tablet by mouth Every 6 (Six) Hours As Needed.      levalbuterol (XOPENEX) 0.63 MG/3ML nebulizer solution Take 1 ampule by nebulization Every 6 (Six) Hours As Needed.         PMH:   Past Medical History:   Diagnosis Date    Arthritis     COPD (chronic obstructive pulmonary disease)     Coronary artery disease     Diabetes mellitus     patient placed on jardiance following CABG    Full dentures     GERD (gastroesophageal reflux disease)     Heart attack     2024    History of transfusion     2024 with CABG    Hypertension      PSH:    Past Surgical History:   Procedure Laterality Date    CARDIAC CATHETERIZATION N/A 2024    Procedure: Left Heart Cath;  Surgeon: Logan Calix MD;  Location:  [facility];  Service: Cardiovascular;  Laterality: N/A;    CAROTID ENDARTERECTOMY Right 10/1/2024    Procedure: CAROTID ENDARTERECTOMY WITH EEG RIGHT;  Surgeon: Anderson Pisano MD;  Location:  [facility] OR;  Service: Vascular;  Laterality: Right;    CATARACT EXTRACTION Bilateral     CORONARY ARTERY BYPASS GRAFT WITH AORTIC VALVE REPAIR/REPLACEMENT N/A 2024    Procedure: CORONARY ARTERY BYPASS X 3 WITH INTERNAL MAMMARY ARTERY GRAFT AND AORTIC VALVE REPAIR/REPLACEMENT, SINDY, EVH;  Surgeon: Gage Gibson MD;  Location:  [facility] OR;  Service: Cardiothoracic;  Laterality: N/A;       Immunization History:  Influenza: No  Pneumococcal: No  Tetanus: Yes    Social History:   Tobacco:   Social History     Tobacco Use   Smoking Status Former    Current packs/day: 0.00    Types: Cigarettes    Quit date:     Years since quittin.8    Passive exposure: Never   Smokeless Tobacco Never      Alcohol:     Social History     Substance and Sexual Activity   Alcohol Use Never       Vitals:           /73 (BP Location: Right arm, Patient Position: Lying)   Pulse 70    "Temp 97.3 °F (36.3 °C) (Temporal)   Resp 18   Ht 172.7 cm (68\")   Wt 74.8 kg (165 lb)   SpO2 98%   BMI 25.09 kg/m²     Physical Exam:  General Appearance:    Alert, cooperative, no distress, appears stated age   Head:    Normocephalic, without obvious abnormality, atraumatic   Lungs:     Clear to auscultation bilaterally, respirations unlabored    Heart:   Regular rate and rhythm,.  Murmur auscultated.    Abdomen:    Soft, nontender.  +bowel sounds   Breast Exam:    deferred   Genitalia:    deferred   Extremities:   Extremities normal, atraumatic, no cyanosis or edema   Skin:   Skin color, texture, turgor normal, no rashes or lesions   Neurologic:   Grossly intact   Results Review  LABS:  Lab Results   Component Value Date    WBC 9.63 11/08/2024    HGB 12.3 (L) 11/08/2024    HCT 37.1 (L) 11/08/2024    MCV 89.4 11/08/2024     11/08/2024    NEUTROABS 7.38 (H) 10/02/2024    GLUCOSE 126 (H) 10/02/2024    BUN 36 (H) 10/02/2024    CREATININE 1.66 (H) 10/02/2024     10/02/2024    K 4.6 10/02/2024     10/02/2024    CO2 25.0 10/02/2024    MG 2.3 10/02/2024    PHOS 4.2 10/02/2024    CALCIUM 8.5 (L) 10/02/2024    ALBUMIN 4.3 08/10/2024    AST 36 08/10/2024    ALT 14 08/10/2024    BILITOT 1.7 (H) 08/10/2024    .1 (H) 08/07/2024    INR 0.97 11/08/2024       RADIOLOGY:  No radiology results for the last 3 days     I reviewed the patient's new clinical results.    Cancer Staging (if applicable)  Cancer Patient: __ yes __no __unknown; If yes, clinical stage T:__ N:__M:__, stage group or __N/A    Impression: Patient presents with bilateral carotid artery stenosis (greater than 70%).    Plan: Dr. Pisano will perform left carotid endarterectomy/EEG-LEFT.   I fully agree with the above.  I saw the patient and spoke with the operative area.  He is aware of the risk of stroke bleeding infection death and agrees to proceed    Praveen Sánchez PA-C   11/08/24   7:10 AM EST     "

## 2024-11-08 NOTE — ANESTHESIA PROCEDURE NOTES
Airway  Urgency: elective    Date/Time: 11/8/2024 7:18 AM  Airway not difficult    General Information and Staff    Patient location during procedure: OR  CRNA/CAA: Vaibhav Huff CRNA    Indications and Patient Condition  Indications for airway management: airway protection    Preoxygenated: yes  MILS not maintained throughout  Mask difficulty assessment: 2 - vent by mask + OA or adjuvant +/- NMBA    Final Airway Details  Final airway type: endotracheal airway      Successful airway: ETT  Cuffed: yes   Successful intubation technique: direct laryngoscopy  Endotracheal tube insertion site: oral  Blade: Lety  Blade size: 4  ETT size (mm): 7.5  Cormack-Lehane Classification: grade I - full view of glottis  Placement verified by: chest auscultation and capnometry   Measured from: lips  ETT/EBT  to lips (cm): 20  Number of attempts at approach: 1  Assessment: lips, teeth, and gum same as pre-op and atraumatic intubation    Additional Comments  Negative epigastric sounds, Breath sound equal bilaterally with symmetric chest rise and fall

## 2024-11-08 NOTE — PROGRESS NOTES
Intensive Care Follow-up     Hospital:  LOS: 0 days   Mr. Preet Edwards, 69 y.o. male is followed for:   Bilateral carotid artery stenosis          Subjective   Interval History:  Encountered patient upon arrival to the ICU. He is complaining of something stuck in his throat. Nursing staff reports he has vomited trying to cough. Does report mild shortness of breath. No stridor. No wheeziness. Nothing in the throat I can appreciate upon examination.     Otherwise, patient denies headache, visual changes, chest pain.        The patient's past medical, surgical and social history were reviewed and updated in Epic as appropriate.       Objective     Infusions:  [START ON 11/9/2024] lactated ringers, 9 mL/hr, Last Rate: 9 mL/hr (11/08/24 0712)  niCARdipine, 5-15 mg/hr, Last Rate: Stopped (11/08/24 0828)  phenylephrine, 0.5-3 mcg/kg/min      Medications:  famotidine, 20 mg, Intravenous, Once  mupirocin, 1 Application, Each Nare, Q12H  sodium chloride, 10 mL, Intravenous, Q12H      I reviewed the patient's medications.    Vital Sign Min/Max for last 24 hours  Temp  Min: 97.3 °F (36.3 °C)  Max: 97.3 °F (36.3 °C)   BP  Min: 161/73  Max: 161/73   Pulse  Min: 70  Max: 70   Resp  Min: 18  Max: 18   SpO2  Min: 98 %  Max: 98 %   No data recorded       Input/Output for last 24 hour shift  No intake/output data recorded.   S RR:  [7-12] 7    Physical Exam:  GENERAL: Patient lying in bed and conversant.   HEENT: Normocephalic and atraumatic. Trachea midline. PER. EOM WNL. Left neck incision dressing C/D/I. JAIMIE drain in place with bloody drainage.    LUNGS: Chest rise of normal depth and symmetric. Lungs clear to auscultation bilaterally. No wheezes, rhonchi, or rales.    HEART: S1,S2 detected. Regular rate and rhythm. No rub, murmur, or gallop.   ABDOMEN: Soft, round, nondistended, and nontender. Bowel sounds present.   EXTREMITIES: No clubbing, edema, or cyanosis. Peripheral pulses present. Skin warm and dry.    NEURO/PSYCH:  Alert and oriented. Follows commands. Moves all extremities. No focal deficits.      Results from last 7 days   Lab Units 11/08/24  0637   WBC 10*3/mm3 9.63   HEMOGLOBIN g/dL 12.3*   PLATELETS 10*3/mm3 200     Results from last 7 days   Lab Units 11/08/24  0637   SODIUM mmol/L 151*   POTASSIUM mmol/L 5.0   CO2 mmol/L 24.0   BUN mg/dL 24*   CREATININE mg/dL 1.62*   GLUCOSE mg/dL 107*     Estimated Creatinine Clearance: 46.8 mL/min (A) (by C-G formula based on SCr of 1.62 mg/dL (H)).          I reviewed the patient's new clinical results.  I reviewed the patient's new imaging results/reports including actual images and agree with reports.     Imaging Results (Last 24 Hours)       ** No results found for the last 24 hours. **            Assessment & Plan   Impression      Bilateral carotid artery stenosis (>70%)    CAD s/p CABG x3 (8/9/24)    ICM / HFrEF (EF 31-35%)    Severe aortic stenosis s/p AVR (8/9/24)    Former smoker    Dyslipidemia    Essential hypertension    GERD (gastroesophageal reflux disease)    Preet Edwards is a 69 year-old male with bilateral carotid stenosis, COPD (most recent PFTs no obstruction), HFrEF (LVEF 31-35 % ECHO 8/15), CAD & AS s/p CABG x & AVR (8/9/24 with Dr. Gibson), HTN, and dyslipidemia that presents to [facility] ICU postoperatively from elective left CEA with Dr. Pisano. Previously underwent right CEA on 10/1/24.      Plan      Admit to ICU  CXR now  Monitor for airway compromise, wean for SPO2 > 88%  Monitor site for S&S of bleeding  Postop orders per surgery  Nicardipine for BP control  Continue close neurological monitoring  ASA, Statin  Ensure adequate pain control  Mobilize patient per protocol  Aggressive pulmonary toilet  SCDs for DVT prophylaxis  AM labs    Time spent: 36 minutes  Plan of care and goals reviewed with multidisciplinary/antibiotic stewardship team during rounds.   I discussed the patient's findings and my recommendations with patient, family, and nursing staff      Jocelin Kim, MSN, APRN, [facility]  Pulmonary and Critical Care Medicine

## 2024-11-08 NOTE — ANESTHESIA POSTPROCEDURE EVALUATION
Patient: Preet Edwards    Procedure Summary       Date: 11/08/24 Room / Location: [facility] OR [address] [place] [address] [facility]    Anesthesia Start: 0712 Anesthesia Stop: 0858    Procedure: LEFT CAROTID ENDARTERECTOMY/EEG (Left: Neck) Diagnosis:       Bilateral carotid artery stenosis      (Bilateral carotid artery stenosis [I65.23])    Surgeons: Anderson Pisano MD Provider: Bart Gaspar MD    Anesthesia Type: general ASA Status: 3            Anesthesia Type: general    Vitals  No vitals data found for the desired time range.          Post Anesthesia Care and Evaluation    Patient location during evaluation: PACU  Patient participation: complete - patient participated  Level of consciousness: responsive to verbal stimuli  Pain score: 0  Pain management: adequate    Airway patency: patent  Anesthetic complications: No anesthetic complications  PONV Status: none  Cardiovascular status: hemodynamically stable and acceptable  Respiratory status: nonlabored ventilation, acceptable, nasal cannula and spontaneous ventilation  Hydration status: acceptable    Comments: Nicardipine infusion started @ 10mg/hr to keep BP within surgeon's parameters  No anesthesia care post op

## 2024-11-08 NOTE — ANESTHESIA PROCEDURE NOTES
Arterial Line      Patient reassessed immediately prior to procedure    Patient location during procedure: pre-op  Stop Time:11/8/2024 6:35 AM       Line placed for hemodynamic monitoring.  Performed By   ESPERANZA/CAA: Vaibhav Huff CRNA   Preanesthetic Checklist  Completed: patient identified, IV checked, site marked, risks and benefits discussed, surgical consent, monitors and equipment checked, pre-op evaluation and timeout performed  Arterial Line Prep    Sterile Tech: cap, gloves and sterile barriers  Prep: ChloraPrep  Patient monitoring: blood pressure monitoring, continuous pulse oximetry and EKG  Arterial Line Procedure   Laterality:right  Location:  radial artery  Catheter size: 20 G   Guidance: ultrasound guided  PROCEDURE NOTE/ULTRASOUND INTERPRETATION.  Using ultrasound guidance the potential vascular sites for insertion of the catheter were visualized to determine the patency of the vessel to be used for vascular access.  After selecting the appropriate site for insertion, the needle was visualized under ultrasound being inserted into the radial artery, followed by ultrasound confirmation of wire and catheter placement. There were no abnormalities seen on ultrasound; an image was taken; and the patient tolerated the procedure with no complications.   Number of attempts: 1  Successful placement: yes Images: still images not obtained  Post Assessment   Dressing Type: line sutured, occlusive dressing applied, secured with tape and wrist guard applied.   Complications no  Circ/Move/Sens Assessment: normal and unchanged.   Patient Tolerance: patient tolerated the procedure well with no apparent complications

## 2024-11-08 NOTE — PLAN OF CARE
"Goal Outcome Evaluation:  Plan of Care Reviewed With: patient           Outcome Evaluation: Pt admitted s/p L CEA, c/o headache/nausea on arrival, headache relieved with norco x1. Pt with continuous c/o \"something stuck in throat\" affecting swallowing of whole foods, pt offered SLP consult for tomorrow AM to further assess. Cardene gtt to maintain SBP <110. Neuro checks WNL                             "

## 2024-11-08 NOTE — OP NOTE
Operative Report    Preop Diagnosis: Left internal carotid artery stenosis        Postoperative Diagnosis: Same      Procedure: Left internal carotid endarterectomy with pericardial patch closure and EEG monitoring        Surgeons: Anderson Pisano MD      Assistant: Shena Lamb PA-C    The Assistant provided services of suctioning, irrigation and retraction.  Also, assisted in suture closure of parts of the skin incision.      Indication: Patient referred to me with critical left internal carotid artery stenosis.  He understood our planned procedure the incision planned risk of stroke bleeding infection death no guarantees were made as to outcome and agreed to proceed        Description: Supine position.  Sterile prep and drape and antibiotics given.  General endotracheal anesthesia.  EEG monitoring was employed.  An incision was made on the left neck anterior to the sternocleidomastoid and through the platysma.  Care was taken to identify the hypoglossal and vagus nerves.  Heparin was given.  The internal/external and common carotid arteries were sequentially clamped and no deleterious EEG changes were encountered.  Artery was opened there was heavy calcific plaque which actually extended quite high into the neck up past the level of the hypoglossal nerve.  This made the endarterectomy somewhat technically challenging but we are able to perform adequate endarterectomy.  Specimen was sent to pathology.  Brisk backbleeding was noted from the internal carotid.  Pericardial patch was sutured over the arteriotomy with 6-0 Prolene least in the proper sequential fashion and again no deleterious EEG changes were encountered.  The incision was irrigated copiously with antibiotic solution a good quality Doppler signal was detected distal to the patch repair.  A Allen-Webb drain was placed and the incision closed with multiple layers of suture sponge and needle count reported as correct.  Estimated blood loss  less than 75 mL and there was no apparent early complications      Please note that portions of this note were completed with a voice recognition program. Efforts were made to edit the dictations, but occasionally words are mistranscribed.

## 2024-11-08 NOTE — ANESTHESIA PREPROCEDURE EVALUATION
Anesthesia Evaluation                  Airway   Mallampati: I  TM distance: >3 FB  Neck ROM: full  No difficulty expected  Dental      Pulmonary    (+) COPD,  Cardiovascular     ECG reviewed    (+) hypertension, valvular problems/murmurs, past MI , CAD, CABG,  carotid artery disease    ROS comment: Ef 31-35%, s/p avr cabg    Neuro/Psych  GI/Hepatic/Renal/Endo    (+) GERD, diabetes mellitus    Musculoskeletal     Abdominal    Substance History      OB/GYN          Other   arthritis,                   Anesthesia Plan    ASA 3     general     (A line)  intravenous induction     Anesthetic plan, risks, benefits, and alternatives have been provided, discussed and informed consent has been obtained with: patient.    Plan discussed with CRNA.    CODE STATUS:

## 2024-11-09 VITALS
HEART RATE: 98 BPM | BODY MASS INDEX: 25.01 KG/M2 | DIASTOLIC BLOOD PRESSURE: 60 MMHG | HEIGHT: 68 IN | RESPIRATION RATE: 20 BRPM | SYSTOLIC BLOOD PRESSURE: 143 MMHG | WEIGHT: 165 LBS | TEMPERATURE: 97.6 F | OXYGEN SATURATION: 89 %

## 2024-11-09 LAB
ANION GAP SERPL CALCULATED.3IONS-SCNC: 12 MMOL/L (ref 5–15)
BASOPHILS # BLD AUTO: 0.02 10*3/MM3 (ref 0–0.2)
BASOPHILS NFR BLD AUTO: 0.2 % (ref 0–1.5)
BUN SERPL-MCNC: 30 MG/DL (ref 8–23)
BUN/CREAT SERPL: 19.1 (ref 7–25)
CALCIUM SPEC-SCNC: 8.6 MG/DL (ref 8.6–10.5)
CHLORIDE SERPL-SCNC: 104 MMOL/L (ref 98–107)
CO2 SERPL-SCNC: 23 MMOL/L (ref 22–29)
CREAT SERPL-MCNC: 1.57 MG/DL (ref 0.76–1.27)
DEPRECATED RDW RBC AUTO: 43.9 FL (ref 37–54)
EGFRCR SERPLBLD CKD-EPI 2021: 48 ML/MIN/1.73
EOSINOPHIL # BLD AUTO: 0 10*3/MM3 (ref 0–0.4)
EOSINOPHIL NFR BLD AUTO: 0 % (ref 0.3–6.2)
ERYTHROCYTE [DISTWIDTH] IN BLOOD BY AUTOMATED COUNT: 13.4 % (ref 12.3–15.4)
GLUCOSE BLDC GLUCOMTR-MCNC: 135 MG/DL (ref 70–130)
GLUCOSE SERPL-MCNC: 127 MG/DL (ref 65–99)
HCT VFR BLD AUTO: 31.2 % (ref 37.5–51)
HGB BLD-MCNC: 10.2 G/DL (ref 13–17.7)
IMM GRANULOCYTES # BLD AUTO: 0.08 10*3/MM3 (ref 0–0.05)
IMM GRANULOCYTES NFR BLD AUTO: 0.6 % (ref 0–0.5)
LYMPHOCYTES # BLD AUTO: 1.22 10*3/MM3 (ref 0.7–3.1)
LYMPHOCYTES NFR BLD AUTO: 9.2 % (ref 19.6–45.3)
MCH RBC QN AUTO: 29.7 PG (ref 26.6–33)
MCHC RBC AUTO-ENTMCNC: 32.7 G/DL (ref 31.5–35.7)
MCV RBC AUTO: 90.7 FL (ref 79–97)
MONOCYTES # BLD AUTO: 0.78 10*3/MM3 (ref 0.1–0.9)
MONOCYTES NFR BLD AUTO: 5.9 % (ref 5–12)
NEUTROPHILS NFR BLD AUTO: 11.19 10*3/MM3 (ref 1.7–7)
NEUTROPHILS NFR BLD AUTO: 84.1 % (ref 42.7–76)
NRBC BLD AUTO-RTO: 0 /100 WBC (ref 0–0.2)
PLATELET # BLD AUTO: 173 10*3/MM3 (ref 140–450)
PMV BLD AUTO: 11 FL (ref 6–12)
POTASSIUM SERPL-SCNC: 4.8 MMOL/L (ref 3.5–5.2)
RBC # BLD AUTO: 3.44 10*6/MM3 (ref 4.14–5.8)
SODIUM SERPL-SCNC: 139 MMOL/L (ref 136–145)
WBC NRBC COR # BLD AUTO: 13.29 10*3/MM3 (ref 3.4–10.8)

## 2024-11-09 PROCEDURE — 94664 DEMO&/EVAL PT USE INHALER: CPT

## 2024-11-09 PROCEDURE — 92610 EVALUATE SWALLOWING FUNCTION: CPT

## 2024-11-09 PROCEDURE — 25810000003 SODIUM CHLORIDE 0.9 % SOLUTION 250 ML FLEX CONT: Performed by: PHYSICIAN ASSISTANT

## 2024-11-09 PROCEDURE — 25010000002 NICARDIPINE 2.5 MG/ML SOLUTION 10 ML VIAL: Performed by: PHYSICIAN ASSISTANT

## 2024-11-09 PROCEDURE — 80048 BASIC METABOLIC PNL TOTAL CA: CPT | Performed by: PHYSICIAN ASSISTANT

## 2024-11-09 PROCEDURE — 82948 REAGENT STRIP/BLOOD GLUCOSE: CPT

## 2024-11-09 PROCEDURE — 94799 UNLISTED PULMONARY SVC/PX: CPT

## 2024-11-09 PROCEDURE — 85025 COMPLETE CBC W/AUTO DIFF WBC: CPT | Performed by: PHYSICIAN ASSISTANT

## 2024-11-09 PROCEDURE — 99024 POSTOP FOLLOW-UP VISIT: CPT | Performed by: PHYSICIAN ASSISTANT

## 2024-11-09 RX ORDER — LABETALOL HYDROCHLORIDE 5 MG/ML
20 INJECTION, SOLUTION INTRAVENOUS EVERY 4 HOURS PRN
Status: DISCONTINUED | OUTPATIENT
Start: 2024-11-09 | End: 2024-11-09 | Stop reason: HOSPADM

## 2024-11-09 RX ADMIN — TIOTROPIUM BROMIDE INHALATION SPRAY 2 PUFF: 3.12 SPRAY, METERED RESPIRATORY (INHALATION) at 08:48

## 2024-11-09 RX ADMIN — ASPIRIN 81 MG: 81 TABLET, COATED ORAL at 08:08

## 2024-11-09 RX ADMIN — EMPAGLIFLOZIN 10 MG: 10 TABLET, FILM COATED ORAL at 08:08

## 2024-11-09 RX ADMIN — FAMOTIDINE 20 MG: 20 TABLET, FILM COATED ORAL at 08:08

## 2024-11-09 RX ADMIN — Medication 20 MG: at 02:42

## 2024-11-09 RX ADMIN — Medication 3 ML: at 08:08

## 2024-11-09 RX ADMIN — Medication 10 ML: at 08:08

## 2024-11-09 RX ADMIN — NICARDIPINE HYDROCHLORIDE 5 MG/HR: 25 INJECTION, SOLUTION INTRAVENOUS at 06:08

## 2024-11-09 RX ADMIN — POTASSIUM CHLORIDE 10 MEQ: 750 CAPSULE, EXTENDED RELEASE ORAL at 08:07

## 2024-11-09 RX ADMIN — NICARDIPINE HYDROCHLORIDE 15 MG/HR: 25 INJECTION, SOLUTION INTRAVENOUS at 02:46

## 2024-11-09 RX ADMIN — NICARDIPINE HYDROCHLORIDE 15 MG/HR: 25 INJECTION, SOLUTION INTRAVENOUS at 01:38

## 2024-11-09 RX ADMIN — ARFORMOTEROL TARTRATE 15 MCG: 15 SOLUTION RESPIRATORY (INHALATION) at 08:47

## 2024-11-09 RX ADMIN — CLOPIDOGREL BISULFATE 75 MG: 75 TABLET ORAL at 08:07

## 2024-11-09 RX ADMIN — FUROSEMIDE 20 MG: 20 TABLET ORAL at 08:07

## 2024-11-09 RX ADMIN — MUPIROCIN 1 APPLICATION: 20 OINTMENT TOPICAL at 08:07

## 2024-11-09 RX ADMIN — CARVEDILOL 6.25 MG: 6.25 TABLET, FILM COATED ORAL at 08:07

## 2024-11-09 NOTE — PLAN OF CARE
Goal Outcome Evaluation:              Outcome Evaluation: Patient continues on cardene gtt @ 5; now titrating SBP <130. Labetolol x1. No c/o pain. 2L NC. 15ml out of JAIMIE. Good UOP. Wife at bedside.

## 2024-11-09 NOTE — THERAPY EVALUATION
Acute Care - Speech Language Pathology   Swallow Initial Evaluation [facility]  Clinical Swallow Evaluation       Patient Name: Preet Edwards  : 1955  MRN: [id number]  Today's Date: 2024               Admit Date: 2024    Visit Dx:     ICD-10-CM ICD-9-CM   1. Dysphagia, unspecified type  R13.10 787.20   2. Stenosis of left carotid artery  I65.22 433.10   3. Bilateral carotid artery stenosis  I65.23 433.10     433.30     Patient Active Problem List   Diagnosis    NSTEMI (non-ST elevated myocardial infarction)    CAD s/p CABG x3 (24)    ICM / HFrEF (EF 31-35%)    Severe aortic stenosis s/p AVR (24)    Bilateral carotid artery stenosis (>70%)    Former smoker    Dyslipidemia    Essential hypertension    GERD (gastroesophageal reflux disease)    Carotid stenosis    Carotid stenosis, asymptomatic, left     Past Medical History:   Diagnosis Date    Arthritis     COPD (chronic obstructive pulmonary disease)     Coronary artery disease     Diabetes mellitus     patient placed on jardiance following CABG    Full dentures     GERD (gastroesophageal reflux disease)     Heart attack     2024    History of transfusion     2024 with CABG    Hypertension      Past Surgical History:   Procedure Laterality Date    CARDIAC CATHETERIZATION N/A 2024    Procedure: Left Heart Cath;  Surgeon: Logan Calix MD;  Location: [facility] CATH INVASIVE LOCATION;  Service: Cardiovascular;  Laterality: N/A;    CAROTID ENDARTERECTOMY Right 10/1/2024    Procedure: CAROTID ENDARTERECTOMY WITH EEG RIGHT;  Surgeon: Anderson Pisano MD;  Location:  [facility] OR;  Service: Vascular;  Laterality: Right;    CATARACT EXTRACTION Bilateral     CORONARY ARTERY BYPASS GRAFT WITH AORTIC VALVE REPAIR/REPLACEMENT N/A 2024    Procedure: CORONARY ARTERY BYPASS X 3 WITH INTERNAL MAMMARY ARTERY GRAFT AND AORTIC VALVE REPAIR/REPLACEMENT, SINDY, EVH;  Surgeon: Gage Gibson MD;  Location: [facility] OR;   "Service: Cardiothoracic;  Laterality: N/A;       SLP Recommendation and Plan  SLP Swallowing Diagnosis: swallow WFL/no suspected pharyngeal impairment (11/09/24 0918)  SLP Diet Recommendation: regular textures, thin liquids (11/09/24 0918)  Recommended Precautions and Strategies: upright posture during/after eating, small bites of food and sips of liquid, alternate between small bites of food and sips of liquid (11/09/24 0918)  SLP Rec. for Method of Medication Administration: meds whole, meds crushed, with puree (11/09/24 0918)     Monitor for Signs of Aspiration: notify SLP if any concerns (11/09/24 0918)     Swallow Criteria for Skilled Therapeutic Interventions Met: demonstrates skilled criteria (11/09/24 0918)  Anticipated Discharge Disposition (SLP): home (11/09/24 0918)  Rehab Potential/Prognosis, Swallowing: good, to achieve stated therapy goals (11/09/24 0918)  Therapy Frequency (Swallow): evaluation only (11/09/24 0918)     Oral Care Recommendations: Oral Care BID/PRN, Toothbrush (11/09/24 0918)                                        Progress:  (eval)      SWALLOW EVALUATION (Last 72 Hours)       SLP Adult Swallow Evaluation       Row Name 11/09/24 0918                   Rehab Evaluation    Document Type evaluation  -MM        Subjective Information no complaints  -MM        Patient Observations alert;cooperative;agree to therapy  -MM        Patient/Family/Caregiver Comments/Observations family present  -MM        Patient Effort good  -MM           General Information    Patient Profile Reviewed yes  -MM        Pertinent History Of Current Problem Pt is a 69 year old male who presented to the facility for a carotid endarterectomy on 11/8. In the pm he c/o feeling something stuck in his throat. Today, the pt feels it has resolved but also feels like the food \"takes longer to go down.\"  -MM        Current Method of Nutrition regular textures;thin liquids  -MM        Precautions/Limitations, Vision WFL;for " "purposes of eval  -MM        Precautions/Limitations, Hearing WFL;for purposes of eval  -MM        Prior Level of Function-Communication WFL  -MM        Prior Level of Function-Swallowing no diet consistency restrictions  -MM        Plans/Goals Discussed with patient and family;agreed upon  -MM        Barriers to Rehab none identified  -MM        Patient's Goals for Discharge return home  -MM           Pain    Pretreatment Pain Rating 0/10 - no pain  -MM        Posttreatment Pain Rating 0/10 - no pain  -MM           Oral Motor Structure and Function    Dentition Assessment edentulous, does not have dentures  -MM        Secretion Management WNL/WFL  -MM        Mucosal Quality moist, healthy  -MM           Oral Musculature and Cranial Nerve Assessment    Oral Motor General Assessment WFL  -MM           General Eating/Swallowing Observations    Respiratory Support Currently in Use nasal cannula  -MM        Eating/Swallowing Skills self-fed  -MM        Positioning During Eating upright in bed  -MM        Utensils Used spoon;cup;straw  -MM        Consistencies Trialed regular textures;pureed;thin liquids  -MM           Clinical Swallow Eval    Oral Prep Phase impaired  -MM        Oral Transit WFL  -MM        Oral Residue WFL  -MM        Pharyngeal Phase no overt signs/symptoms of pharyngeal impairment  -MM        Esophageal Phase unremarkable  -MM        Clinical Swallow Evaluation Summary Pt reported \"food takes longer to go down\" after eating regular texture. Pt reported alternating thin liquids helps with clearance. He denied odynophagia. SLP provided education re: procedure, possible swelling, to call if throat feels swollen or worsens. All questions answered.  -MM           Oral Prep Concerns    Oral Prep Concerns prolonged mastication  -MM        Prolonged Mastication regular consistencies  -MM        Oral Prep Concerns, Comment Prolonged mastication secondary to edentulous status. Pt reported his dentures broke " and he is scheduled to get new ones this upcoming week.  -MM           SLP Evaluation Clinical Impression    SLP Swallowing Diagnosis swallow WFL/no suspected pharyngeal impairment  -MM        Functional Impact no impact on function  -MM        Rehab Potential/Prognosis, Swallowing good, to achieve stated therapy goals  -MM        Swallow Criteria for Skilled Therapeutic Interventions Met demonstrates skilled criteria  -MM           Recommendations    Therapy Frequency (Swallow) evaluation only  -MM        SLP Diet Recommendation regular textures;thin liquids  -MM        Recommended Precautions and Strategies upright posture during/after eating;small bites of food and sips of liquid;alternate between small bites of food and sips of liquid  -MM        Oral Care Recommendations Oral Care BID/PRN;Toothbrush  -MM        SLP Rec. for Method of Medication Administration meds whole;meds crushed;with puree  -MM        Monitor for Signs of Aspiration notify SLP if any concerns  -MM        Anticipated Discharge Disposition (SLP) home  -MM                  User Key  (r) = Recorded By, (t) = Taken By, (c) = Cosigned By      Initials Name Effective Dates    Tavia Downs MS CCC-SLP 08/30/24 -                     EDUCATION  The patient has been educated in the following areas:   Dysphagia (Swallowing Impairment).                Time Calculation:    Time Calculation- SLP       Row Name 11/09/24 1019             Time Calculation- SLP    SLP Start Time 0918  -MM      SLP Received On 11/09/24  -MM         Untimed Charges    [id number] Eval Oral Pharyng Swallow Minutes 54  -MM         Total Minutes    Untimed Charges Total Minutes 54  -MM       Total Minutes 54  -MM                User Key  (r) = Recorded By, (t) = Taken By, (c) = Cosigned By      Initials Name Provider Type    Tavia Downs MS CCC-SLP Speech and Language Pathologist                    Therapy Charges for Today       Code Description Service Date Service  Provider Modifiers Qty    [id number] [facility] EVAL ORAL PHARYNG SWALLOW 4 11/9/2024 Tavia Cyr, MS CCC-SLP GN 1                 Tavia Cyr, MS DESIRAE-SLP  11/9/2024

## 2024-11-09 NOTE — PLAN OF CARE
Goal Outcome Evaluation:  Plan of Care Reviewed With: patient           Pt met discharge criteria, SBP <150, discharged per order. Education provided

## 2024-11-09 NOTE — PLAN OF CARE
Goal Outcome Evaluation:  Plan of Care Reviewed With: patient, family        Progress:  (eval)       Anticipated Discharge Disposition (SLP): home          SLP Swallowing Diagnosis: swallow WFL/no suspected pharyngeal impairment (11/09/24 7902)

## 2024-11-09 NOTE — PROGRESS NOTES
1 Day Post-Op left carotid endarterectomy       LOS: 1 day   Patient Care Team:  Edgard Romero MD as PCP - General (Family Medicine)    Chief complaint:    Subjective   Denies chest pain, denies shortness of breath    Objective    Vital Signs  Temp:  [96.7 °F (35.9 °C)-97.8 °F (36.6 °C)] 97.8 °F (36.6 °C)  Heart Rate:  [58-91] 89  Resp:  [12-26] 20  BP: ()/(39-62) 144/61  Arterial Line BP: ()/(38-88) 95/51    Physical Exam:   General Appearance: alert, appears stated age and cooperative   Lungs: clear bilaterally   Heart: Regular rate and rhythm   Skin:  Incision c/d/i   Neuro intact, tongue is midline, rinks equal bilaterally  Results     Results from last 7 days   Lab Units 11/09/24  0515   WBC 10*3/mm3 13.29*   HEMOGLOBIN g/dL 10.2*   HEMATOCRIT % 31.2*   PLATELETS 10*3/mm3 173     Results from last 7 days   Lab Units 11/09/24  0515   SODIUM mmol/L 139   POTASSIUM mmol/L 4.8   CHLORIDE mmol/L 104   CO2 mmol/L 23.0   BUN mg/dL 30*   CREATININE mg/dL 1.57*   GLUCOSE mg/dL 127*   CALCIUM mg/dL 8.6               Assessment      Bilateral carotid artery stenosis (>70%)    CAD s/p CABG x3 (8/9/24)    ICM / HFrEF (EF 31-35%)    Severe aortic stenosis s/p AVR (8/9/24)    Former smoker    Dyslipidemia    Essential hypertension    GERD (gastroesophageal reflux disease)    Carotid stenosis, asymptomatic, left        Plan   discontinue JAIMIE drain  Wean off Cardene  Needs blood pressure control  Discharge home     Jb Arboleda PA-C  11/09/24  08:39 EST

## 2024-11-17 NOTE — DISCHARGE SUMMARY
CTS Discharge Summary    Patient Care Team:  Edgard Romero MD as PCP - General (Family Medicine)      Date of Admission: 11/8/2024  5:26 AM  Date of Discharge: 11/9/2024    Discharge Diagnosis  Past Medical History:   Diagnosis Date    Arthritis     COPD (chronic obstructive pulmonary disease)     Coronary artery disease     Diabetes mellitus     patient placed on jardiance following CABG    Full dentures     GERD (gastroesophageal reflux disease)     Heart attack     august 7, 2024    History of transfusion     august 9 2024 with CABG    Hypertension          Bilateral carotid artery stenosis (>70%)    CAD s/p CABG x3 (8/9/24)    ICM / HFrEF (EF 31-35%)    Severe aortic stenosis s/p AVR (8/9/24)    Former smoker    Dyslipidemia    Essential hypertension    GERD (gastroesophageal reflux disease)    Carotid stenosis, asymptomatic, left      History of Present Illnessor scheduled surgery and anticipates a left carotid endarterectomy/EEG-LEFT.  Patient reports he takes Plavix and states his last dose was Monday, 11/4/2024.  He denies any symptomatic changes or recent illnesses since last office visit.  He recently underwent right carotid endarterectomy.         Hospital Course  Patient is a 69 y.o. male underwent left internal carotid artery endarterectomy with pericardial patch closure and EEG interop monitoring he had no IntraOp complications postop course he did well was able be discharged home his first postop day    Procedures Performed  Procedure(s):  CAROTID ENDARTERECTOMY WITH EEG LEFT       Consults:   Consults       No orders found from 10/10/2024 to 11/9/2024.              Discharge Medications     Discharge Medications        Continue These Medications        Instructions Start Date   acetaminophen 500 MG tablet  Commonly known as: TYLENOL   1,000 mg, Daily      albuterol sulfate  (90 Base) MCG/ACT inhaler  Commonly known as: PROVENTIL HFA;VENTOLIN HFA;PROAIR HFA   2 puffs, Every 4 Hours PRN       Anoro Ellipta 62.5-25 MCG/ACT aerosol powder  inhaler  Generic drug: Umeclidinium-Vilanterol   1 puff, As Needed      aspirin 81 MG EC tablet   81 mg, Oral, Daily      carvedilol 12.5 MG tablet  Commonly known as: COREG   6.25 mg, Oral, 2 Times Daily      clopidogrel 75 MG tablet  Commonly known as: PLAVIX   75 mg, Daily      empagliflozin 10 MG tablet tablet  Commonly known as: Jardiance   10 mg, Oral, Daily      famotidine 20 MG tablet  Commonly known as: PEPCID   1 tablet, Every 12 Hours Scheduled      furosemide 40 MG tablet  Commonly known as: LASIX   20 mg, Daily      HYDROcodone-acetaminophen 5-325 MG per tablet  Commonly known as: NORCO   1 tablet, Every 6 Hours PRN      levalbuterol 0.63 MG/3ML nebulizer solution  Commonly known as: XOPENEX   1 ampule, Every 6 Hours PRN      potassium chloride 10 MEQ CR tablet   10 mEq, Oral, Daily      rosuvastatin 20 MG tablet  Commonly known as: CRESTOR   20 mg, Oral, Nightly               Discharge Diet:   Diet Instructions    Resume heart healthy diet           Activity at Discharge:   Activity Instructions    No heavy lifting greater than 10 lbs until follow up appointment.          Do not drive while taking narcotics    Follow-up Appointments  Future Appointments   Date Time Provider Department Center   11/21/2024  1:00 PM Mari Barreto PA-C [facility]   12/16/2024  9:00 AM Logan Calix MD [facility]          Jb Arboleda PA-C  11/17/24  10:22 EST

## 2024-11-18 DIAGNOSIS — I25.5 ISCHEMIC CARDIOMYOPATHY: ICD-10-CM

## 2024-11-18 DIAGNOSIS — I50.21 ACUTE HFREF (HEART FAILURE WITH REDUCED EJECTION FRACTION): ICD-10-CM

## 2024-11-25 DIAGNOSIS — I50.21 ACUTE HFREF (HEART FAILURE WITH REDUCED EJECTION FRACTION): ICD-10-CM

## 2024-11-25 DIAGNOSIS — I25.5 ISCHEMIC CARDIOMYOPATHY: ICD-10-CM

## 2024-11-25 RX ORDER — CARVEDILOL 12.5 MG/1
6.25 TABLET ORAL 2 TIMES DAILY
Start: 2024-11-25

## 2024-11-25 RX ORDER — CARVEDILOL 6.25 MG/1
6.25 TABLET ORAL 2 TIMES DAILY
Qty: 60 TABLET | Refills: 5 | Status: SHIPPED | OUTPATIENT
Start: 2024-11-25

## 2024-11-25 NOTE — TELEPHONE ENCOUNTER
Last visit 10/23/24, has follow up with Dr. Calix 12/16/24. Please refill if appropriate, deny, or re-route.  Jay Lin MA

## 2024-11-26 NOTE — PROGRESS NOTES
"     [facility] Cardiothoracic Surgery Office Follow Up Note     Date of Encounter: 2024     Name: Preet Edwards  : 1955     Referred By: No ref. provider found  PCP: Edgard Romero MD    Chief Complaint:    No chief complaint on file.      Subjective      History of Present Illness:    Preet Edwards is a 69 y.o. male  with a past medical history of ischemic cardiomyopathy, CAD s/p CABG x 3, NSTEMI, severe aortic stenosis s/p AVR, hyperlipidemia, COPD, GERD, bilateral carotid artery disease and former smoker. He underwent CABG x 3 with EVH of the right greater saphenous vein and AVR (# 23 bovine Tapia valve) w/ Dr. Gibson on 24.    He then underwent right CEA on 10/01/24 with Dr. Pisano followed by left CEA on 24 with Dr. Pisano.    He returns today with complaints of hoarse voice and difficulty swallowing thin liquids. He gets \"choked up\" drinking water. He has some balance problems, but no recent falls.   He has recently been diagnosed with Covid and has a productive cough as well.     Review of Systems:  Review of Systems   Constitutional: Positive for night sweats. Negative for chills, decreased appetite, diaphoresis, fever, malaise/fatigue, weight gain and weight loss.   HENT:  Positive for congestion (post nasal drip) and hoarse voice (pt states problem swallowing since sx).    Eyes:  Negative for blurred vision, double vision and visual disturbance.   Cardiovascular:  Positive for leg swelling. Negative for chest pain, claudication, dyspnea on exertion, irregular heartbeat, near-syncope, orthopnea, palpitations, paroxysmal nocturnal dyspnea and syncope.   Respiratory:  Positive for cough, sputum production and wheezing. Negative for hemoptysis and shortness of breath.    Hematologic/Lymphatic: Negative for adenopathy and bleeding problem. Bruises/bleeds easily.   Skin:  Negative for color change, nail changes, poor wound healing and rash.   Musculoskeletal:  " Positive for arthritis, back pain, joint pain and joint swelling. Negative for falls and muscle cramps.   Gastrointestinal:  Negative for abdominal pain, dysphagia and heartburn.   Genitourinary:  Positive for nocturia and urgency. Negative for flank pain.   Neurological:  Positive for dizziness (when standing up to quick) and light-headedness. Negative for brief paralysis, disturbances in coordination, focal weakness, headaches, loss of balance, numbness, paresthesias, sensory change, vertigo and weakness.   Psychiatric/Behavioral:  Negative for depression and suicidal ideas.    Allergic/Immunologic: Positive for environmental allergies. Negative for persistent infections.       I have reviewed the following portions of the patient's history: problem list, current medications, allergies, past surgical history, past medical history, past social history, past family history, and ROS and confirm it's accurate.    Allergies:  No Known Allergies    Medications:      Current Outpatient Medications:     acetaminophen (TYLENOL) 500 MG tablet, Take 2 tablets by mouth Daily., Disp: , Rfl:     albuterol sulfate  (90 Base) MCG/ACT inhaler, Inhale 2 puffs Every 4 (Four) Hours As Needed for Wheezing., Disp: , Rfl:     Anoro Ellipta 62.5-25 MCG/ACT aerosol powder  inhaler, Inhale 1 puff As Needed., Disp: , Rfl:     aspirin 81 MG EC tablet, Take 1 tablet by mouth Daily., Disp: 90 tablet, Rfl: 1    carvedilol (COREG) 6.25 MG tablet, Take 1 tablet by mouth 2 (Two) Times a Day., Disp: 60 tablet, Rfl: 5    clopidogrel (PLAVIX) 75 MG tablet, Take 1 tablet by mouth Daily., Disp: , Rfl:     empagliflozin (Jardiance) 10 MG tablet tablet, Take 1 tablet by mouth Daily., Disp: 90 tablet, Rfl: 1    famotidine (PEPCID) 20 MG tablet, Take 1 tablet by mouth Every 12 (Twelve) Hours., Disp: , Rfl:     furosemide (LASIX) 40 MG tablet, Take 0.5 tablets by mouth Daily., Disp: , Rfl:     HYDROcodone-acetaminophen (NORCO) 5-325 MG per tablet,  Take 1 tablet by mouth Every 6 (Six) Hours As Needed., Disp: , Rfl:     levalbuterol (XOPENEX) 0.63 MG/3ML nebulizer solution, Take 1 ampule by nebulization Every 6 (Six) Hours As Needed., Disp: , Rfl:     potassium chloride 10 MEQ CR tablet, Take 1 tablet by mouth Daily., Disp: 30 tablet, Rfl: 11    rosuvastatin (CRESTOR) 20 MG tablet, Take 1 tablet by mouth Every Night., Disp: 30 tablet, Rfl: 11    History:   Past Medical History:   Diagnosis Date    Arthritis     COPD (chronic obstructive pulmonary disease)     Coronary artery disease     Diabetes mellitus     patient placed on jardiance following CABG    Full dentures     GERD (gastroesophageal reflux disease)     Heart attack     august 7, 2024    History of transfusion     august 9 2024 with CABG    Hypertension        Past Surgical History:   Procedure Laterality Date    CARDIAC CATHETERIZATION N/A 08/07/2024    Procedure: Left Heart Cath;  Surgeon: Logan Calix MD;  Location:  [facility] CATH INVASIVE LOCATION;  Service: Cardiovascular;  Laterality: N/A;    CAROTID ENDARTERECTOMY Right 10/1/2024    Procedure: CAROTID ENDARTERECTOMY WITH EEG RIGHT;  Surgeon: Anderson Pisano MD;  Location:  [facility] OR;  Service: Vascular;  Laterality: Right;    CAROTID ENDARTERECTOMY Left 11/8/2024    Procedure: CAROTID ENDARTERECTOMY WITH EEG LEFT;  Surgeon: Anderson Pisano MD;  Location:  [facility] OR;  Service: Vascular;  Laterality: Left;    CATARACT EXTRACTION Bilateral     CORONARY ARTERY BYPASS GRAFT WITH AORTIC VALVE REPAIR/REPLACEMENT N/A 08/09/2024    Procedure: CORONARY ARTERY BYPASS X 3 WITH INTERNAL MAMMARY ARTERY GRAFT AND AORTIC VALVE REPAIR/REPLACEMENT, SINDY, EVH;  Surgeon: Gage Gibson MD;  Location:  [facility];  Service: Cardiothoracic;  Laterality: N/A;       Social History     Socioeconomic History    Marital status:     Number of children: 4   Tobacco Use    Smoking status: Former     Current packs/day: 0.00     Types: Cigarettes     Quit date:  2010     Years since quittin.9     Passive exposure: Never    Smokeless tobacco: Never   Vaping Use    Vaping status: Former   Substance and Sexual Activity    Alcohol use: Never    Drug use: Not Currently    Sexual activity: Defer        Family History   Problem Relation Age of Onset    Heart disease Mother     Heart attack Mother     Diabetes Mother     Cancer Father     Lung cancer Father     No Known Problems Sister     No Known Problems Sister     Heart attack Brother     Heart disease Brother     Heart attack Brother     Heart disease Brother        Objective     Physical Exam:  There were no vitals filed for this visit.   There is no height or weight on file to calculate BMI.    Physical Exam  Vitals reviewed.   Constitutional:       Appearance: Normal appearance.   HENT:      Mouth/Throat:      Tongue: Tongue does not deviate from midline.   Eyes:      Pupils: Pupils are equal, round, and reactive to light.   Cardiovascular:      Rate and Rhythm: Normal rate and regular rhythm.      Pulses: Normal pulses.      Heart sounds: Normal heart sounds.   Pulmonary:      Effort: Pulmonary effort is normal.      Breath sounds: Normal breath sounds.      Comments: Productive cough, upper airway congestion  Skin:     General: Skin is dry.      Capillary Refill: Capillary refill takes less than 2 seconds.   Neurological:      General: No focal deficit present.      Mental Status: He is alert and oriented to person, place, and time.      Cranial Nerves: No facial asymmetry.   Psychiatric:         Mood and Affect: Mood normal.         Behavior: Behavior normal.         Imaging/Labs:  CXR obtained today no evidence of pneumonia       Assessment / Plan      Assessment / Plan:  Carotid artery stenosis s/p right CEA 10/01/24 and left CEA 24 with Dr. Pisano  Discussed with patient that hoarseness and swallowing difficulty should improve over the next few weeks, but that it could take up to a year. Patient knows to  call and request to be seen if this does not improve  Incisions area healing well. BP is under good control.   May continue surveillance with Cardiology, he has a scheduled appointment with Dr. Calix.     Follow Up:   Release to follow up as needed  Or sooner for any further concerns or worsening sign and symptoms. If unable to reach us in the office please dial 911 or go to the nearest emergency department.      Mari Barreto PA-C   [facility] Cardiothoracic Surgery      Time Spent: I spent 30 minutes caring for Preet on this date of service. This time includes time spent by me in the following activities: preparing for the visit, reviewing tests, obtaining and/or reviewing a separately obtained history, performing a medically appropriate examination and/or evaluation, counseling and educating the patient/family/caregiver, ordering medications, tests, or procedures, referring and communicating with other health care professionals, documenting information in the medical record, independently interpreting results and communicating that information with the patient/family/caregiver, and care coordination.

## 2024-11-27 ENCOUNTER — OFFICE VISIT (OUTPATIENT)
Dept: CARDIAC SURGERY | Facility: CLINIC | Age: 69
End: 2024-11-27
Payer: COMMERCIAL

## 2024-11-27 VITALS
OXYGEN SATURATION: 97 % | DIASTOLIC BLOOD PRESSURE: 70 MMHG | HEIGHT: 68 IN | WEIGHT: 161.8 LBS | BODY MASS INDEX: 24.52 KG/M2 | SYSTOLIC BLOOD PRESSURE: 120 MMHG | TEMPERATURE: 97.8 F | HEART RATE: 95 BPM

## 2024-11-27 DIAGNOSIS — I65.23 BILATERAL CAROTID ARTERY STENOSIS: Primary | ICD-10-CM

## 2024-11-27 PROCEDURE — 99024 POSTOP FOLLOW-UP VISIT: CPT | Performed by: PHYSICIAN ASSISTANT

## 2024-11-27 RX ORDER — BENZONATATE 100 MG/1
100 CAPSULE ORAL 3 TIMES DAILY PRN
COMMUNITY
Start: 2024-11-21 | End: 2024-12-02

## 2024-12-02 ENCOUNTER — OFFICE VISIT (OUTPATIENT)
Dept: CARDIOLOGY | Facility: CLINIC | Age: 69
End: 2024-12-02
Payer: COMMERCIAL

## 2024-12-02 VITALS
HEIGHT: 68 IN | SYSTOLIC BLOOD PRESSURE: 184 MMHG | BODY MASS INDEX: 24.37 KG/M2 | DIASTOLIC BLOOD PRESSURE: 88 MMHG | WEIGHT: 160.8 LBS | HEART RATE: 103 BPM | OXYGEN SATURATION: 95 %

## 2024-12-02 DIAGNOSIS — I25.810 CORONARY ARTERY DISEASE INVOLVING CORONARY BYPASS GRAFT OF NATIVE HEART WITHOUT ANGINA PECTORIS: Primary | ICD-10-CM

## 2024-12-02 DIAGNOSIS — I10 ESSENTIAL HYPERTENSION: ICD-10-CM

## 2024-12-02 DIAGNOSIS — E78.5 DYSLIPIDEMIA: ICD-10-CM

## 2024-12-02 PROCEDURE — 99214 OFFICE O/P EST MOD 30 MIN: CPT | Performed by: INTERNAL MEDICINE

## 2024-12-02 RX ORDER — CHOLECALCIFEROL (VITAMIN D3) 1250 MCG
CAPSULE ORAL
COMMUNITY
Start: 2024-11-22

## 2024-12-02 NOTE — PROGRESS NOTES
[facility]  Subjective:     Encounter Date: 12/02/2024      Patient ID: Preet Edwards is a 69 y.o. male.    Chief Complaint: Coronary artery disease involving coronary bypass graft of       PROBLEM LIST:  CAD  [facility], 08/07/2024: EF 30%.Multivessel coronary artery disease. 90% ulcerated proximal LAD stenosis, involves origin of moderate-sized diagonal. 80% proximal left circumflex stenosis. Small subtotally occluded OM1. Chronically occluded proximal RCA with 3+ collaterals to the large right PDA. Likely critical aortic stenosis with peak to peak gradient of 46 mmHg. In the setting of severe LV dysfunction this is likely critical aortic stenosis.  Echo, 08/08/2024: EF 35%. The following left ventricular wall segments are hypokinetic: mid anterior, apical anterior, apical lateral, apical septal and apex hypokinetic. Severe AV stenosis. Aortic valve maximum pressure gradient is 67 mmHg. Aortic valve mean pressure gradient is 42 mmHg.   CABG x3, 08/09/2024: Left Anterior Descending Artery, SVG to Diagonal and Obtuse Marginal AND AORTIC VALVE REPAIR/REPLACEMENT, 23mm Bovine Tapia valve   Echo, 08/15/2024: EF 35%. The following left ventricular wall segments are hypokinetic: apical anterior, apical lateral and apex hypokinetic.   Carotid artery disease   Carotid duplex, 08/08/2024: Bilateral ICA >70% stenosis.  Right CEA, Dr. Pisano, 9/24  Left CEA, Dr. Pisano 10/24  hypertension  Hyperlipidemia  COPD  Remote tobacco use      History of Present Illness  Preet Edwards returns today for a follow up with a history of CAD and cardiac risk factors. Since last visit, patient has been doing well overall from a cardiovascular standpoint. He is planning to start rehab for his CABG soon. Patient questioned when he should be able to return to working as a  and was advised. He notes his blood pressure is elevated today because his wife had surgery today. Patient checks  his blood pressure regularly at home. Patient denies chest pain, shortness of breath, orthopnea, palpitations, edema, dizziness, and syncope.          No Known Allergies      Current Outpatient Medications:     acetaminophen (TYLENOL) 500 MG tablet, Take 2 tablets by mouth Daily., Disp: , Rfl:     albuterol sulfate  (90 Base) MCG/ACT inhaler, Inhale 2 puffs Every 4 (Four) Hours As Needed for Wheezing., Disp: , Rfl:     Anoro Ellipta 62.5-25 MCG/ACT aerosol powder  inhaler, Inhale 1 puff As Needed., Disp: , Rfl:     aspirin 81 MG EC tablet, Take 1 tablet by mouth Daily., Disp: 90 tablet, Rfl: 1    carvedilol (COREG) 6.25 MG tablet, Take 1 tablet by mouth 2 (Two) Times a Day., Disp: 60 tablet, Rfl: 5    Cholecalciferol (Vitamin D3) 1.25 MG (05894 UT) capsule, , Disp: , Rfl:     clopidogrel (PLAVIX) 75 MG tablet, Take 1 tablet by mouth Daily., Disp: , Rfl:     empagliflozin (Jardiance) 10 MG tablet tablet, Take 1 tablet by mouth Daily., Disp: 90 tablet, Rfl: 1    famotidine (PEPCID) 20 MG tablet, Take 1 tablet by mouth Every 12 (Twelve) Hours., Disp: , Rfl:     furosemide (LASIX) 40 MG tablet, Take 0.5 tablets by mouth Daily., Disp: , Rfl:     HYDROcodone-acetaminophen (NORCO) 5-325 MG per tablet, Take 1 tablet by mouth Every 6 (Six) Hours As Needed., Disp: , Rfl:     levalbuterol (XOPENEX) 0.63 MG/3ML nebulizer solution, Take 1 ampule by nebulization Every 6 (Six) Hours As Needed., Disp: , Rfl:     potassium chloride 10 MEQ CR tablet, Take 1 tablet by mouth Daily., Disp: 30 tablet, Rfl: 11    rosuvastatin (CRESTOR) 20 MG tablet, Take 1 tablet by mouth Every Night., Disp: 30 tablet, Rfl: 11    The following portions of the patient's history were reviewed and updated as appropriate: allergies, current medications, past family history, past medical history, past social history, past surgical history and problem list.    ROS       Objective:     Vitals:    12/02/24 1548   BP: (!) 184/88   BP Location: Right arm  "  Patient Position: Sitting   Cuff Size: Adult   Pulse: 103   SpO2: 95%   Weight: 72.9 kg (160 lb 12.8 oz)   Height: 172.7 cm (67.99\")         Vitals reviewed.   Constitutional:       Appearance: Well-developed and not in distress.   Neck:      Thyroid: No thyromegaly.      Vascular: No carotid bruit or JVD.   Pulmonary:      Breath sounds: Normal breath sounds.   Chest:      Comments: Well-healed sternotomy and CEA scars bilateral.  Cardiovascular:      Regular rhythm.      No gallop. No S3 and S4 gallop.   Pulses:     Intact distal pulses.      Carotid: 2+ bilaterally.     Radial: 2+ bilaterally.  Edema:     Peripheral edema absent.   Abdominal:      General: Bowel sounds are normal.      Palpations: Abdomen is soft. There is no abdominal mass.      Tenderness: There is no abdominal tenderness.   Musculoskeletal:         General: No deformity.      Extremities: No clubbing present.Skin:     General: Skin is warm and dry.      Findings: No rash.   Neurological:      Mental Status: Alert and oriented to person, place, and time.         Lab Review:  Lab Results   Component Value Date    GLUCOSE 127 (H) 11/09/2024    BUN 30 (H) 11/09/2024    CREATININE 1.57 (H) 11/09/2024    BCR 19.1 11/09/2024    K 4.8 11/09/2024    CO2 23.0 11/09/2024    CALCIUM 8.6 11/09/2024    ALBUMIN 4.3 08/10/2024    ALKPHOS 22 (L) 08/10/2024    AST 36 08/10/2024    ALT 14 08/10/2024     Lab Results   Component Value Date    CHOL 80 08/09/2024    TRIG 72 08/09/2024    HDL 15 (L) 08/09/2024    LDL 49 08/09/2024      Lab Results   Component Value Date    WBC 13.29 (H) 11/09/2024    RBC 3.44 (L) 11/09/2024    HGB 10.2 (L) 11/09/2024    HCT 31.2 (L) 11/09/2024    MCV 90.7 11/09/2024     11/09/2024     Lab Results   Component Value Date    TSH 4.230 (H) 08/08/2024     Lab Results   Component Value Date    HGBA1C 5.70 (H) 09/30/2024        Procedures      Advance Care Planning   ACP discussion was held with the patient during this visit. " Patient does not have an advance directive, declines further assistance.           Assessment:   Diagnoses and all orders for this visit:    1. Coronary artery disease involving coronary bypass graft of native heart without angina pectoris (Primary)    2. Essential hypertension    3. Dyslipidemia        Impression:  1. Coronary artery disease 2 months post CABG.. Stable and asymptomatic. Continue on aspirin 81 mg for antiplatelet therapy. Continue on Plavix 75 mg daily for antiplatelet therapy.     2. Essential hypertension. Elevated. Continue on carvedilol 6.25 mg BID for hypertension. Continue on Lasix 40 mg daily for fluid retention.  Patient notes blood pressure, which she checks twice daily, is running 1 20-1 30s at home.    3. Dyslipidemia. Well controlled.  On high intensity statin    4.  Carotid artery disease status post bilateral CEA's    5.  Ischemic cardiomyopathy, preoperative LVEF 35%.    Plan:  Stable cardiac status.   Continue current medications.  DAPT at least 1 year and perhaps lifelong.  Strongly encouraged starting cardiac rehab as soon as possible.  They will also be able to follow his blood pressure to see if further medications are needed.  Check echocardiogram next 1 to 2 weeks.  If LVEF still low may need ACE/ARB/Entresto and we may need to consider ICD prior to returning to work.  Notify patient to be off work until her next visit in January  Revisit in 6 WKS, or sooner as needed.          Scribed for Logan Calix MD by Shelton Shultz.    Logan Calix MD    Please note that portions of this note may have been completed with a voice recognition program. Efforts were made to edit the dictations, but occasionally words are mistranscribed.

## 2024-12-18 ENCOUNTER — TELEPHONE (OUTPATIENT)
Dept: CARDIOLOGY | Facility: CLINIC | Age: 69
End: 2024-12-18
Payer: COMMERCIAL

## 2024-12-18 DIAGNOSIS — I25.5 ISCHEMIC CARDIOMYOPATHY: Primary | ICD-10-CM

## 2024-12-18 DIAGNOSIS — I50.22 CHRONIC HFREF (HEART FAILURE WITH REDUCED EJECTION FRACTION): ICD-10-CM

## 2024-12-18 NOTE — TELEPHONE ENCOUNTER
Caller: LARRY HASSAN    Relationship: Emergency Contact    Best call back number: [phone]    What form or medical record are you requesting: SHORT TERM DISABILITY PAPERWORK     Who is requesting this form or medical record from you: [employer] (EMPLOYER)    How would you like to receive the form or medical records (pick-up, mail, fax): FAX    Timeframe paperwork needed: BEFORE 12/28/24    Additional notes: PATIENT WIFE CALLED IN TO SEE IF DR. WHELAN CAN DO HIS SHORT TERM DISABILITY PAPERWORK. PATIENT HAS BEEN GETTING THIS PAPERWORK FILLED OUT BY HIS PCP BUT HE HASN'T SEEN HIS PCP IN SOME TIME. PATIENT WIFE STATED THEY ARE GOING TO FAX OVER PAPERWORK AND ON IT WILL BE A FAX NUMBER TO FAX THE PAPERWORK BACK.

## 2024-12-18 NOTE — TELEPHONE ENCOUNTER
Spoke with wife, fax number given, per Dr. Calix's last office note, patient to be off until Jan appt.  Wife will have paperwork faxed to our office.

## 2025-01-09 ENCOUNTER — HOSPITAL ENCOUNTER (OUTPATIENT)
Facility: HOSPITAL | Age: 70
Discharge: HOME | End: 2025-01-09
Admitting: INTERNAL MEDICINE
Payer: COMMERCIAL

## 2025-01-09 VITALS — HEIGHT: 68 IN | BODY MASS INDEX: 24.25 KG/M2 | WEIGHT: 160 LBS

## 2025-01-09 DIAGNOSIS — I50.22 CHRONIC HFREF (HEART FAILURE WITH REDUCED EJECTION FRACTION): ICD-10-CM

## 2025-01-09 DIAGNOSIS — I25.5 ISCHEMIC CARDIOMYOPATHY: ICD-10-CM

## 2025-01-09 PROCEDURE — 93306 TTE W/DOPPLER COMPLETE: CPT | Performed by: INTERNAL MEDICINE

## 2025-01-09 PROCEDURE — 93306 TTE W/DOPPLER COMPLETE: CPT

## 2025-01-09 PROCEDURE — 25010000002 SULFUR HEXAFLUORIDE MICROSPH 60.7-25 MG RECONSTITUTED SUSPENSION: Performed by: INTERNAL MEDICINE

## 2025-01-09 RX ADMIN — SULFUR HEXAFLUORIDE 2 ML: KIT at 15:30

## 2025-01-10 LAB
AV MEAN PRESS GRAD SYS DOP V1V2: 8 MMHG
AV VMAX SYS DOP: 199.5 CM/SEC
BH CV ECHO MEAS - AI P1/2T: 295.6 MSEC
BH CV ECHO MEAS - AO MAX PG: 15.9 MMHG
BH CV ECHO MEAS - AO ROOT DIAM: 2.6 CM
BH CV ECHO MEAS - AO V2 VTI: 38.4 CM
BH CV ECHO MEAS - AVA(I,D): 1.47 CM2
BH CV ECHO MEAS - EDV(CUBED): 190.1 ML
BH CV ECHO MEAS - EDV(MOD-SP2): 105 ML
BH CV ECHO MEAS - EDV(MOD-SP4): 89.6 ML
BH CV ECHO MEAS - EF(MOD-SP2): 45.8 %
BH CV ECHO MEAS - EF(MOD-SP4): 49.7 %
BH CV ECHO MEAS - ESV(CUBED): 42.9 ML
BH CV ECHO MEAS - ESV(MOD-SP2): 56.9 ML
BH CV ECHO MEAS - ESV(MOD-SP4): 45.1 ML
BH CV ECHO MEAS - FS: 39.1 %
BH CV ECHO MEAS - IVS/LVPW: 0.95 CM
BH CV ECHO MEAS - IVSD: 0.9 CM
BH CV ECHO MEAS - LAT PEAK E' VEL: 5.6 CM/SEC
BH CV ECHO MEAS - LV DIASTOLIC VOL/BSA (35-75): 48.3 CM2
BH CV ECHO MEAS - LV MASS(C)D: 207.7 GRAMS
BH CV ECHO MEAS - LV MAX PG: 3 MMHG
BH CV ECHO MEAS - LV MEAN PG: 1 MMHG
BH CV ECHO MEAS - LV SYSTOLIC VOL/BSA (12-30): 24.3 CM2
BH CV ECHO MEAS - LV V1 MAX: 86.6 CM/SEC
BH CV ECHO MEAS - LV V1 VTI: 17.1 CM
BH CV ECHO MEAS - LVIDD: 5.8 CM
BH CV ECHO MEAS - LVIDS: 3.5 CM
BH CV ECHO MEAS - LVOT AREA: 3.3 CM2
BH CV ECHO MEAS - LVOT DIAM: 2.05 CM
BH CV ECHO MEAS - LVPWD: 0.95 CM
BH CV ECHO MEAS - MED PEAK E' VEL: 7.9 CM/SEC
BH CV ECHO MEAS - MV A MAX VEL: 99 CM/SEC
BH CV ECHO MEAS - MV DEC SLOPE: 466.5 CM/SEC2
BH CV ECHO MEAS - MV DEC TIME: 0.11 SEC
BH CV ECHO MEAS - MV E MAX VEL: 52.2 CM/SEC
BH CV ECHO MEAS - MV E/A: 0.53
BH CV ECHO MEAS - MV MAX PG: 3.7 MMHG
BH CV ECHO MEAS - MV MEAN PG: 1 MMHG
BH CV ECHO MEAS - MV V2 VTI: 16 CM
BH CV ECHO MEAS - MVA(VTI): 3.5 CM2
BH CV ECHO MEAS - PA ACC TIME: 0.1 SEC
BH CV ECHO MEAS - PA V2 MAX: 97.5 CM/SEC
BH CV ECHO MEAS - SV(LVOT): 56.4 ML
BH CV ECHO MEAS - SV(MOD-SP2): 48.1 ML
BH CV ECHO MEAS - SV(MOD-SP4): 44.5 ML
BH CV ECHO MEAS - SVI(LVOT): 30.5 ML/M2
BH CV ECHO MEAS - SVI(MOD-SP2): 26 ML/M2
BH CV ECHO MEAS - SVI(MOD-SP4): 24 ML/M2
BH CV ECHO MEAS - TAPSE (>1.6): 1.35 CM
BH CV ECHO MEASUREMENTS AVERAGE E/E' RATIO: 7.73
BH CV VAS BP LEFT ARM: NORMAL MMHG
BH CV XLRA - RV BASE: 2.6 CM
BH CV XLRA - RV LENGTH: 7.7 CM
BH CV XLRA - RV MID: 2.6 CM
BH CV XLRA - TDI S': 8.2 CM/SEC
LEFT ATRIUM VOLUME INDEX: 19.7 ML/M2
LV EF 2D ECHO EST: 40 %

## 2025-01-10 NOTE — PROGRESS NOTES
[facility] Cardiology  Subjective:     Encounter Date: 01/13/2025      Patient ID: Preet Edwards is a 69 y.o. male.    Chief Complaint: Coronary Artery Disease (Coronary artery disease involving coronary bypass graft of native heart without angina pectoris)      PROBLEM LIST:  CAD  [facility], 08/07/2024: EF 30%.Multivessel coronary artery disease. 90% ulcerated proximal LAD stenosis, involves origin of moderate-sized diagonal. 80% proximal left circumflex stenosis. Small subtotally occluded OM1. Chronically occluded proximal RCA with 3+ collaterals to the large right PDA. Likely critical aortic stenosis with peak to peak gradient of 46 mmHg. In the setting of severe LV dysfunction this is likely critical aortic stenosis.  Echo, 08/08/2024: EF 35%. The following left ventricular wall segments are hypokinetic: mid anterior, apical anterior, apical lateral, apical septal and apex hypokinetic. Severe AV stenosis. Aortic valve maximum pressure gradient is 67 mmHg. Aortic valve mean pressure gradient is 42 mmHg.   CABG x3, 08/09/2024: Left Anterior Descending Artery, SVG to Diagonal and Obtuse Marginal AND AORTIC VALVE REPAIR/REPLACEMENT, 23mm Bovine Tapia valve   Echo, 08/15/2024: EF 35%. The following left ventricular wall segments are hypokinetic: apical anterior, apical lateral and apex hypokinetic.   Echo, 01/09/2025: EF 36-40%. Hypokinetic wall segments (apical anterior, apical lateral, apical septal) Bioprosthetic aortic valve present. Moderate paravalvular regurgitation in prosthetic aortic valve. Moderate PFO present with left to right shunting.   Carotid artery disease   Carotid duplex, 08/08/2024: Bilateral ICA >70% stenosis.  Right CEA, Dr. Pisano, 9/24  Left CEA, Dr. Pisano 10/24  hypertension  Hyperlipidemia  COPD  Remote tobacco use      History of Present Illness  Preet Edwards returns today for a 6 week follow up with a history of CAD and cardiac risk factors.  Since last visit, patient has been doing well overall from a cardiovascular standpoint. He notes dizziness while walking or periods of activity and his vision going dark. He reports his blood pressure runs high during physical therapy. Recent echo was discussed. Starting valsartan and potentially discontinuing lasix was discussed. Patient denies chest pain, shortness of breath, orthopnea, palpitations, edema, and syncope.     No Known Allergies      Current Outpatient Medications:     acetaminophen (TYLENOL) 500 MG tablet, Take 2 tablets by mouth Daily., Disp: , Rfl:     albuterol sulfate  (90 Base) MCG/ACT inhaler, Inhale 2 puffs Every 4 (Four) Hours As Needed for Wheezing., Disp: , Rfl:     Anoro Ellipta 62.5-25 MCG/ACT aerosol powder  inhaler, Inhale 1 puff As Needed., Disp: , Rfl:     aspirin 81 MG EC tablet, Take 1 tablet by mouth Daily., Disp: 90 tablet, Rfl: 1    carvedilol (COREG) 6.25 MG tablet, Take 1 tablet by mouth 2 (Two) Times a Day., Disp: 60 tablet, Rfl: 5    Cholecalciferol (Vitamin D3) 1.25 MG (33424 UT) capsule, , Disp: , Rfl:     clopidogrel (PLAVIX) 75 MG tablet, Take 1 tablet by mouth Daily., Disp: , Rfl:     empagliflozin (Jardiance) 10 MG tablet tablet, Take 1 tablet by mouth Daily., Disp: 90 tablet, Rfl: 1    famotidine (PEPCID) 20 MG tablet, Take 1 tablet by mouth Every 12 (Twelve) Hours., Disp: , Rfl:     furosemide (LASIX) 40 MG tablet, Take 0.5 tablets by mouth Daily., Disp: , Rfl:     HYDROcodone-acetaminophen (NORCO) 5-325 MG per tablet, Take 1 tablet by mouth Every 6 (Six) Hours As Needed., Disp: , Rfl:     levalbuterol (XOPENEX) 0.63 MG/3ML nebulizer solution, Take 1 ampule by nebulization Every 6 (Six) Hours As Needed., Disp: , Rfl:     potassium chloride 10 MEQ CR tablet, Take 1 tablet by mouth Daily., Disp: 30 tablet, Rfl: 11    rosuvastatin (CRESTOR) 20 MG tablet, Take 1 tablet by mouth Every Night., Disp: 30 tablet, Rfl: 11    The following portions of the patient's  "history were reviewed and updated as appropriate: allergies, current medications, past family history, past medical history, past social history, past surgical history and problem list.    ROS       Objective:     Vitals:    01/13/25 1404   BP: 134/60   BP Location: Right arm   Patient Position: Sitting   Pulse: 83   SpO2: 95%   Weight: 72.8 kg (160 lb 9.6 oz)   Height: 172.7 cm (68\")         Vitals reviewed.   Constitutional:       Appearance: Well-developed and not in distress.   Neck:      Thyroid: No thyromegaly.      Vascular: No carotid bruit or JVD.   Pulmonary:      Breath sounds: Normal breath sounds.   Cardiovascular:      Regular rhythm.      Murmurs: There is a grade 1/6 systolic murmur at the URSB.      No gallop. No S3 and S4 gallop.   Pulses:     Intact distal pulses.      Carotid: 2+ bilaterally.     Radial: 2+ bilaterally.  Edema:     Peripheral edema absent.   Abdominal:      General: Bowel sounds are normal.      Palpations: Abdomen is soft. There is no abdominal mass.      Tenderness: There is no abdominal tenderness.   Musculoskeletal:         General: No deformity.      Extremities: No clubbing present.Skin:     General: Skin is warm and dry.      Findings: No rash.   Neurological:      Mental Status: Alert and oriented to person, place, and time.         Lab Review:  Lab Results   Component Value Date    GLUCOSE 127 (H) 11/09/2024    BUN 30 (H) 11/09/2024    CREATININE 1.57 (H) 11/09/2024    BCR 19.1 11/09/2024    K 4.8 11/09/2024    CO2 23.0 11/09/2024    CALCIUM 8.6 11/09/2024    ALBUMIN 4.3 08/10/2024    ALKPHOS 22 (L) 08/10/2024    AST 36 08/10/2024    ALT 14 08/10/2024     Lab Results   Component Value Date    CHOL 80 08/09/2024    TRIG 72 08/09/2024    HDL 15 (L) 08/09/2024    LDL 49 08/09/2024      Lab Results   Component Value Date    WBC 13.29 (H) 11/09/2024    RBC 3.44 (L) 11/09/2024    HGB 10.2 (L) 11/09/2024    HCT 31.2 (L) 11/09/2024    MCV 90.7 11/09/2024     11/09/2024 "     Lab Results   Component Value Date    TSH 4.230 (H) 08/08/2024     Lab Results   Component Value Date    HGBA1C 5.70 (H) 09/30/2024        Procedures      Advance Care Planning   ACP discussion was held with the patient during this visit. Patient has an advance directive (not in EMR), copy requested.           Assessment:   Diagnoses and all orders for this visit:    1. Coronary artery disease involving coronary bypass graft of native heart without angina pectoris (Primary)    2. Essential hypertension    3. Dyslipidemia        Impression:  1. Coronary artery disease. Stable without angina on current activity. Continue on aspirin 81 mg for antiplatelet therapy. Continue on Plavix 75 mg daily for antiplatelet therapy.     2. Essential hypertension. Controlled. Continue on carvedilol 6.25 mg BID for hypertension.     3. Dyslipidemia. Controlled. Continue on rosuvastatin 20 mg daily for hyperlipidemia.     4.  Ischemic cardiomyopathy, LVEF still approximate 35% by echo.  Did not tolerate Entresto due to hypotension.  No evidence of heart failure.  5.  COPD resolved tobacco use    Plan:  Functional class II with ischemic cardiomyopathy.  2.  Start on valsartan 80 mg daily for hypertension.   3.  Decrease furosemide to 3 times weekly  4.  Add eplerenone 25 mg daily  5.  Recheck MUGA in 3 to 4 weeks.  6.  If LVEF is still less than 35% by MUGA, would need ICD.  7.  Defer return to work until after assessment whether ICD is needed ([employer] requirements).        Scribed for Logan Calix MD by Shelton Shultz. 1/13/2025  16:58 EST  Logan Calix MD      Please note that portions of this note may have been completed with a voice recognition program. Efforts were made to edit the dictations, but occasionally words are mistranscribed.

## 2025-01-13 ENCOUNTER — OFFICE VISIT (OUTPATIENT)
Dept: CARDIOLOGY | Facility: CLINIC | Age: 70
End: 2025-01-13
Payer: COMMERCIAL

## 2025-01-13 VITALS
HEIGHT: 68 IN | SYSTOLIC BLOOD PRESSURE: 134 MMHG | DIASTOLIC BLOOD PRESSURE: 60 MMHG | BODY MASS INDEX: 24.34 KG/M2 | HEART RATE: 83 BPM | OXYGEN SATURATION: 95 % | WEIGHT: 160.6 LBS

## 2025-01-13 DIAGNOSIS — I50.22 CHRONIC HFREF (HEART FAILURE WITH REDUCED EJECTION FRACTION): ICD-10-CM

## 2025-01-13 DIAGNOSIS — E78.5 DYSLIPIDEMIA: ICD-10-CM

## 2025-01-13 DIAGNOSIS — I10 ESSENTIAL HYPERTENSION: ICD-10-CM

## 2025-01-13 DIAGNOSIS — I25.5 ISCHEMIC CARDIOMYOPATHY: Primary | ICD-10-CM

## 2025-01-13 DIAGNOSIS — I25.810 CORONARY ARTERY DISEASE INVOLVING CORONARY BYPASS GRAFT OF NATIVE HEART WITHOUT ANGINA PECTORIS: Primary | ICD-10-CM

## 2025-01-13 PROCEDURE — 99214 OFFICE O/P EST MOD 30 MIN: CPT | Performed by: INTERNAL MEDICINE

## 2025-01-13 RX ORDER — VALSARTAN 80 MG/1
80 TABLET ORAL DAILY
Qty: 30 TABLET | Refills: 11 | Status: SHIPPED | OUTPATIENT
Start: 2025-01-13 | End: 2025-01-16 | Stop reason: SDUPTHER

## 2025-01-16 DIAGNOSIS — I50.21 ACUTE HFREF (HEART FAILURE WITH REDUCED EJECTION FRACTION): ICD-10-CM

## 2025-01-16 DIAGNOSIS — I25.5 ISCHEMIC CARDIOMYOPATHY: ICD-10-CM

## 2025-01-17 RX ORDER — VALSARTAN 80 MG/1
80 TABLET ORAL DAILY
Qty: 30 TABLET | Refills: 11 | Status: SHIPPED | OUTPATIENT
Start: 2025-01-17

## 2025-01-17 RX ORDER — CARVEDILOL 6.25 MG/1
6.25 TABLET ORAL 2 TIMES DAILY
Qty: 60 TABLET | Refills: 5 | Status: SHIPPED | OUTPATIENT
Start: 2025-01-17

## 2025-02-06 ENCOUNTER — TELEPHONE (OUTPATIENT)
Dept: CARDIAC SURGERY | Facility: CLINIC | Age: 70
End: 2025-02-06
Payer: COMMERCIAL

## 2025-02-06 NOTE — TELEPHONE ENCOUNTER
Mr. Edwards's spouse, Praveen called stating that patient is having hoarseness and dysphagia that has not improved since surgery. Spouse feels that this is actually worsening. He is unable to talk loud and has to slowly enunciate words in order for others to hear/understand him. He has problems swallowing liquids and solids.     I spoke with SABRINA Morales- patient will need ENT referral started, and can see Mari in office for evaluation.

## 2025-02-10 RX ORDER — VALSARTAN 80 MG/1
80 TABLET ORAL DAILY
Qty: 30 TABLET | Refills: 11 | Status: SHIPPED | OUTPATIENT
Start: 2025-02-10

## 2025-02-10 NOTE — PROGRESS NOTES
[facility] Cardiothoracic Surgery Office Follow Up Note     Date of Encounter: 2025     Name: Preet Edwards  : 1955     Referred By: No ref. provider found  PCP: Edgard Romero MD    Chief Complaint:    Chief Complaint   Patient presents with    Follow-up     Follow up with complaints of hoarseness and dysphagia since left CEA 2024.  He has had issues swallowing food and drinking with choking sensation and tightness in his neck. He is only able to speak with whisper as speaking louder feels like he is straining.       Subjective      History of Present Illness:    Preet Edwards is a 69 y.o. male with ischemic cardiomyopathy, CAD s/p CABG x 3, NSTEMI, severe aortic stenosis s/p AVR, hyperlipidemia, COPD, GERD, bilateral carotid artery disease and former smoker.   He underwent CABG x 3 with EVH of the right greater saphenous vein and AVR (# 23 bovine Tapia valve) w/ Dr. Gibson on 24.    He then underwent right CEA on 10/01/24 with Dr. Pisano followed by left CEA on 24 with Dr. Pisano.    Patient was last seen in clinic on 24 with complaints of hoarseness and difficulty swallowing. He returns today with similar complaint, except that he and his wife feel that the problem is worsening. He is hoarse and reports that he has difficulty swallowing both solids and liquids.   He denies weakness of the extremities, gait disturbances, or visual changes.     Review of Systems:  Review of Systems   Constitutional: Negative. Negative for chills, decreased appetite, diaphoresis, fever, malaise/fatigue, night sweats, weight gain and weight loss.   HENT:  Positive for hoarse voice.    Eyes: Negative.  Negative for blurred vision, double vision and visual disturbance.   Cardiovascular: Negative.  Negative for chest pain, claudication, dyspnea on exertion, irregular heartbeat, leg swelling, near-syncope, orthopnea, palpitations, paroxysmal nocturnal dyspnea and  syncope.   Respiratory:  Positive for cough. Negative for hemoptysis, shortness of breath, sputum production and wheezing.    Hematologic/Lymphatic: Negative for adenopathy and bleeding problem. Bruises/bleeds easily.   Skin: Negative.  Negative for color change, nail changes, poor wound healing and rash.   Musculoskeletal:  Positive for back pain (lumbar). Negative for falls and muscle cramps.   Gastrointestinal:  Positive for dysphagia and nausea (minimal). Negative for abdominal pain and heartburn.   Genitourinary: Negative.  Negative for flank pain.   Neurological:  Positive for dizziness (daily) and light-headedness. Negative for brief paralysis, disturbances in coordination, focal weakness, headaches, loss of balance, numbness, paresthesias, sensory change, vertigo and weakness.   Psychiatric/Behavioral: Negative.  Negative for depression and suicidal ideas.    Allergic/Immunologic: Negative for persistent infections.     I have reviewed the following portions of the patient's history: problem list, current medications, allergies, past surgical history, past medical history, past social history, past family history, and ROS and confirm it's accurate.    Allergies:  No Known Allergies    Medications:      Current Outpatient Medications:     acetaminophen (TYLENOL) 500 MG tablet, Take 2 tablets by mouth Daily., Disp: , Rfl:     albuterol sulfate  (90 Base) MCG/ACT inhaler, Inhale 2 puffs Every 4 (Four) Hours As Needed for Wheezing., Disp: , Rfl:     Anoro Ellipta 62.5-25 MCG/ACT aerosol powder  inhaler, Inhale 1 puff As Needed., Disp: , Rfl:     aspirin 81 MG EC tablet, Take 1 tablet by mouth Daily., Disp: 90 tablet, Rfl: 1    carvedilol (COREG) 6.25 MG tablet, Take 1 tablet by mouth 2 (Two) Times a Day., Disp: 60 tablet, Rfl: 5    Cholecalciferol (Vitamin D3) 1.25 MG (64017 UT) capsule, , Disp: , Rfl:     clopidogrel (PLAVIX) 75 MG tablet, Take 1 tablet by mouth Daily., Disp: , Rfl:     empagliflozin  (Jardiance) 10 MG tablet tablet, Take 1 tablet by mouth Daily., Disp: 90 tablet, Rfl: 1    famotidine (PEPCID) 20 MG tablet, Take 1 tablet by mouth Every 12 (Twelve) Hours., Disp: , Rfl:     furosemide (LASIX) 40 MG tablet, Take 0.5 tablets by mouth Daily., Disp: , Rfl:     levalbuterol (XOPENEX) 0.63 MG/3ML nebulizer solution, Take 1 ampule by nebulization Every 6 (Six) Hours As Needed., Disp: , Rfl:     potassium chloride 10 MEQ CR tablet, Take 1 tablet by mouth Daily., Disp: 30 tablet, Rfl: 11    rosuvastatin (CRESTOR) 20 MG tablet, Take 1 tablet by mouth Every Night., Disp: 30 tablet, Rfl: 11    valsartan (DIOVAN) 80 MG tablet, Take 1 tablet by mouth Daily., Disp: 30 tablet, Rfl: 11    HYDROcodone-acetaminophen (NORCO) 5-325 MG per tablet, Take 1 tablet by mouth Every 6 (Six) Hours As Needed., Disp: , Rfl:     History:   Past Medical History:   Diagnosis Date    Abnormal ECG 8/7/24    Aortic valve replaced     Arthritis     CHF (congestive heart failure) 8/9/24    COPD (chronic obstructive pulmonary disease)     Coronary artery disease     Diabetes mellitus     patient placed on jardiance following CABG    Full dentures     GERD (gastroesophageal reflux disease)     Heart attack     august 7, 2024    Heart valve disease August 7th    History of transfusion     august 9 2024 with CABG    Hypertension        Past Surgical History:   Procedure Laterality Date    AORTIC VALVE REPAIR/REPLACEMENT  8/9/24    AORTIC VALVE SURGERY  8/82024    ARTERIAL BYPASS SURGERY  8/9/24    CARDIAC CATHETERIZATION N/A 08/07/2024    Procedure: Left Heart Cath;  Surgeon: Logan Calix MD;  Location:  [facility]IVE LOC[facility];  Service: Cardiovascular;  Laterality: N/A;    CARDIAC VALVE REPLACEMENT  8/9/24    CAROTID ENDARTERECTOMY Right 10/01/2024    Procedure: CAROTID ENDARTERECTOMY WITH EEG RIGHT;  Surgeon: Anderson Pisano MD;  Location:  [facility];  Service: Vascular;  Laterality: Right;    CAROTID ENDARTERECTOMY Left  "11/08/2024    Procedure: CAROTID ENDARTERECTOMY WITH EEG LEFT;  Surgeon: Anderson Pisano MD;  Location:  [facility];  Service: Vascular;  Laterality: Left;    CATARACT EXTRACTION Bilateral     CORONARY ARTERY BYPASS GRAFT  8/9/24    CORONARY ARTERY BYPASS GRAFT WITH AORTIC VALVE REPAIR/REPLACEMENT N/A 08/09/2024    Procedure: CORONARY ARTERY BYPASS X 3 WITH INTERNAL MAMMARY ARTERY GRAFT AND AORTIC VALVE REPAIR/REPLACEMENT, SINDY, EVH;  Surgeon: Gage Gibson MD;  Location:  [facility];  Service: Cardiothoracic;  Laterality: N/A;       Social History     Socioeconomic History    Marital status:     Number of children: 4   Tobacco Use    Smoking status: Former     Current packs/day: 0.00     Types: Cigarettes     Quit date: 2010     Years since quitting: 15.1     Passive exposure: Never    Smokeless tobacco: Never    Tobacco comments:     Pt states that he quit smoking in 2010   Vaping Use    Vaping status: Former   Substance and Sexual Activity    Alcohol use: Never    Drug use: Never    Sexual activity: Not Currently     Partners: Female        Family History   Problem Relation Age of Onset    Heart disease Mother     Heart attack Mother     Diabetes Mother     Cancer Father     Lung cancer Father     No Known Problems Sister     No Known Problems Sister     Heart attack Brother     Heart disease Brother     Heart attack Brother     Heart disease Brother        Objective     Physical Exam:  Vitals:    02/11/25 1014 02/11/25 1015   BP: 110/56 118/58   BP Location: Left arm Right arm   Patient Position: Sitting Sitting   Pulse: 79    Temp: 97.1 °F (36.2 °C)    SpO2: 99%    Weight: 75.7 kg (166 lb 12.8 oz)    Height: 172.7 cm (67.99\")  Comment: pt reported       Body mass index is 25.37 kg/m².    Physical Exam  Vitals reviewed.   Constitutional:       Appearance: Normal appearance.   HENT:      Mouth/Throat:      Comments: Voice is hoarse and strained.   Neurological:      Mental Status: He is alert. "         Imaging/Labs:  No new imaging to review     Assessment / Plan      Assessment / Plan:    Dysphagia s/p left CEA on 11/8/24 with Dr. Pisano  Was last seen in our clinic with complaint of hoarseness and dysphagia on 11/27/24, POD19  It has now been 13 weeks since surgery and the patient and his wife feel that the symptoms are worsening. Indeed, this patient's voice does sound weaker than the last time I evaluated him in the office.   External exam reveals no abnormalities, incisions have healed nicely. Therefore I will refer to the patient to ENT for their assessment and may wish to refer the patient to speech therapy if the patient would be a reasonable candidate     Follow Up:   Referral to ENT   RTC in 6 months for follow up   Or sooner for any further concerns or worsening sign and symptoms. If unable to reach us in the office please dial 911 or go to the nearest emergency department.      Mari Barreto PA-C   [facility] Cardiothoracic Surgery      Time Spent: I spent 30 minutes caring for Preet on this date of service. This time includes time spent by me in the following activities: preparing for the visit, reviewing tests, obtaining and/or reviewing a separately obtained history, performing a medically appropriate examination and/or evaluation, counseling and educating the patient/family/caregiver, ordering medications, tests, or procedures, referring and communicating with other health care professionals, documenting information in the medical record, independently interpreting results and communicating that information with the patient/family/caregiver, and care coordination.

## 2025-02-11 ENCOUNTER — OFFICE VISIT (OUTPATIENT)
Dept: CARDIAC SURGERY | Facility: CLINIC | Age: 70
End: 2025-02-11
Payer: COMMERCIAL

## 2025-02-11 ENCOUNTER — HOSPITAL ENCOUNTER (OUTPATIENT)
Dept: CARDIOLOGY | Facility: HOSPITAL | Age: 70
Discharge: HOME | End: 2025-02-11
Admitting: INTERNAL MEDICINE
Payer: COMMERCIAL

## 2025-02-11 VITALS
OXYGEN SATURATION: 99 % | HEIGHT: 68 IN | TEMPERATURE: 97.1 F | BODY MASS INDEX: 25.28 KG/M2 | HEART RATE: 79 BPM | WEIGHT: 166.8 LBS | SYSTOLIC BLOOD PRESSURE: 118 MMHG | DIASTOLIC BLOOD PRESSURE: 58 MMHG

## 2025-02-11 DIAGNOSIS — I50.22 CHRONIC HFREF (HEART FAILURE WITH REDUCED EJECTION FRACTION): ICD-10-CM

## 2025-02-11 DIAGNOSIS — I25.5 ISCHEMIC CARDIOMYOPATHY: ICD-10-CM

## 2025-02-11 DIAGNOSIS — I65.22 CAROTID STENOSIS, ASYMPTOMATIC, LEFT: Primary | ICD-10-CM

## 2025-02-11 PROCEDURE — 34310000005 TECHNETIUM LABELED RED BLOOD CELLS: Performed by: INTERNAL MEDICINE

## 2025-02-11 PROCEDURE — 78472 GATED HEART PLANAR SINGLE: CPT

## 2025-02-11 PROCEDURE — A9560 TC99M LABELED RBC: HCPCS | Performed by: INTERNAL MEDICINE

## 2025-02-11 RX ADMIN — TECHNETIUM TC 99M-LABELED RED BLOOD CELLS 1 DOSE: KIT at 12:20

## 2025-02-14 NOTE — PROGRESS NOTES
[facility] Cardiology  Subjective:     Encounter Date: 02/17/2025      Patient ID: Preet Edwards is a 69 y.o. male.    Chief Complaint: Coronary artery disease involving coronary bypass graft of  (4-Wk F/U)      PROBLEM LIST:  CAD  [facility], 08/07/2024: EF 30%.Multivessel coronary artery disease. 90% ulcerated proximal LAD stenosis, involves origin of moderate-sized diagonal. 80% proximal left circumflex stenosis. Small subtotally occluded OM1. Chronically occluded proximal RCA with 3+ collaterals to the large right PDA. Likely critical aortic stenosis with peak to peak gradient of 46 mmHg. In the setting of severe LV dysfunction this is likely critical aortic stenosis.  Echo, 08/08/2024: EF 35%. The following left ventricular wall segments are hypokinetic: mid anterior, apical anterior, apical lateral, apical septal and apex hypokinetic. Severe AV stenosis. Aortic valve maximum pressure gradient is 67 mmHg. Aortic valve mean pressure gradient is 42 mmHg.   CABG x3, 08/09/2024: Left Anterior Descending Artery, SVG to Diagonal and Obtuse Marginal AND AORTIC VALVE REPAIR/REPLACEMENT, 23mm Bovine Tapia valve   Echo, 08/15/2024: EF 35%. The following left ventricular wall segments are hypokinetic: apical anterior, apical lateral and apex hypokinetic.   Echo, 01/09/2025: EF 36-40%. Hypokinetic wall segments (apical anterior, apical lateral, apical septal) Bioprosthetic aortic valve present. Moderate paravalvular regurgitation in prosthetic aortic valve. Moderate PFO present with left to right shunting.   Carotid artery disease   Carotid duplex, 08/08/2024: Bilateral ICA >70% stenosis.  Right CEA, Dr. Pisano, 9/24  Left CEA, Dr. Pisano 10/24  Heart failure  MUGA, 02/11/2025: EF 46%.   hypertension  Hyperlipidemia  COPD  Remote tobacco use      History of Present Illness  Preet Edwards returns today for a 4 week follow up with a history of CAD, ICM, and cardiac risk factors. Since  last visit, patient has been doing well overall from a cardiovascular standpoint. Patient's recent MUGA was discussed and was told he could go back to work as a . Patient denies chest pain, shortness of breath, orthopnea, palpitations, edema, dizziness, and syncope.      No Known Allergies      Current Outpatient Medications:     albuterol sulfate  (90 Base) MCG/ACT inhaler, Inhale 2 puffs Every 4 (Four) Hours As Needed for Wheezing., Disp: , Rfl:     Anoro Ellipta 62.5-25 MCG/ACT aerosol powder  inhaler, Inhale 1 puff As Needed., Disp: , Rfl:     aspirin 81 MG EC tablet, Take 1 tablet by mouth Daily., Disp: 90 tablet, Rfl: 1    carvedilol (COREG) 6.25 MG tablet, Take 1 tablet by mouth 2 (Two) Times a Day., Disp: 60 tablet, Rfl: 5    Cholecalciferol (Vitamin D3) 1.25 MG (46964 UT) capsule, , Disp: , Rfl:     clopidogrel (PLAVIX) 75 MG tablet, Take 1 tablet by mouth Daily., Disp: , Rfl:     empagliflozin (Jardiance) 10 MG tablet tablet, Take 1 tablet by mouth Daily., Disp: 90 tablet, Rfl: 1    famotidine (PEPCID) 20 MG tablet, Take 1 tablet by mouth Every 12 (Twelve) Hours., Disp: , Rfl:     furosemide (LASIX) 40 MG tablet, Take 0.5 tablets by mouth Daily. (Patient taking differently: Take 1 tablet by mouth 3 (Three) Times a Week. Sunday, Tuesday, Thursday.), Disp: , Rfl:     levalbuterol (XOPENEX) 0.63 MG/3ML nebulizer solution, Take 1 ampule by nebulization Every 6 (Six) Hours As Needed., Disp: , Rfl:     potassium chloride 10 MEQ CR tablet, Take 1 tablet by mouth Daily., Disp: 30 tablet, Rfl: 11    rosuvastatin (CRESTOR) 20 MG tablet, Take 1 tablet by mouth Every Night., Disp: 30 tablet, Rfl: 11    valsartan (DIOVAN) 80 MG tablet, Take 1 tablet by mouth Daily., Disp: 30 tablet, Rfl: 11    The following portions of the patient's history were reviewed and updated as appropriate: allergies, current medications, past family history, past medical history, past social history, past surgical  "history and problem list.    ROS       Objective:     Vitals:    02/17/25 1457   BP: 114/44   BP Location: Left arm   Patient Position: Sitting   Cuff Size: Adult   Pulse: 90   SpO2: 97%   Weight: 74.9 kg (165 lb 3.2 oz)   Height: 172.7 cm (68\")         Vitals reviewed.   Constitutional:       Appearance: Well-developed and not in distress.   Neck:      Thyroid: No thyromegaly.      Vascular: No carotid bruit or JVD.   Pulmonary:      Breath sounds: Normal breath sounds.   Cardiovascular:      Regular rhythm.      Murmurs: There is a high frequency decrescendo, early diastolic murmur at the URSB, radiating to the apex.      No gallop. No S3 and S4 gallop.   Pulses:     Intact distal pulses.      Carotid: 2+ bilaterally.     Radial: 2+ bilaterally.  Edema:     Peripheral edema absent.   Abdominal:      General: Bowel sounds are normal.      Palpations: Abdomen is soft. There is no abdominal mass.      Tenderness: There is no abdominal tenderness.   Musculoskeletal:         General: No deformity.      Extremities: No clubbing present.Skin:     General: Skin is warm and dry.      Findings: No rash.   Neurological:      Mental Status: Alert and oriented to person, place, and time.         Lab Review:  Lab Results   Component Value Date    GLUCOSE 127 (H) 11/09/2024    BUN 30 (H) 11/09/2024    CREATININE 1.57 (H) 11/09/2024    BCR 19.1 11/09/2024    K 4.8 11/09/2024    CO2 23.0 11/09/2024    CALCIUM 8.6 11/09/2024    ALBUMIN 4.3 08/10/2024    ALKPHOS 22 (L) 08/10/2024    AST 36 08/10/2024    ALT 14 08/10/2024     Lab Results   Component Value Date    CHOL 80 08/09/2024    TRIG 72 08/09/2024    HDL 15 (L) 08/09/2024    LDL 49 08/09/2024      Lab Results   Component Value Date    WBC 13.29 (H) 11/09/2024    RBC 3.44 (L) 11/09/2024    HGB 10.2 (L) 11/09/2024    HCT 31.2 (L) 11/09/2024    MCV 90.7 11/09/2024     11/09/2024     Lab Results   Component Value Date    TSH 4.230 (H) 08/08/2024     Lab Results   Component " Value Date    HGBA1C 5.70 (H) 09/30/2024        Procedures      Advance Care Planning   ACP discussion was held with the patient during this visit. Patient has an advance directive in EMR which is still valid.            Assessment:   Diagnoses and all orders for this visit:    1. Coronary artery disease involving coronary bypass graft of native heart without angina pectoris (Primary)    2. Essential hypertension    3. Dyslipidemia    4. Ischemic cardiomyopathy        Impression:  1. Coronary artery disease. Stable without angina on current activity. Stable without angina on current activity. Continue on aspirin 81 mg for antiplatelet therapy. Continue on Plavix 75 mg daily for antiplatelet therapy.     2. Essential hypertension. Well controlled. Continue on carvedilol 6.25 mg BID for hypertension. Continue on valsartan 80 mg daily for hypertension. Wean off Lasix 40 mg to PRN.     3. Dyslipidemia. Well controlled. Last LDL 49. Continue on rosuvastatin 20 mg daily for hyperlipidemia.      4. Ischemic cardiomyopathy. EF on MUGA was 46%. No evidence of heart failure.     5.  Status post AVR, with moderate paravalvular regurgitation on echo.  Asymptomatic    6.  Status post bilateral CEA's last year.    Plan:  Stable cardiac status.   Patient wishes to do to return to work, and rate may return.  To get DOT clearance.  Change furosemide to as needed  Continue other current medications.  Revisit in 6 MO, or sooner as needed.        Scribed for Logan Calix MD by Shelton Shultz 2/17/2025  17:08 EST  Logan Calix MD      Please note that portions of this note may have been completed with a voice recognition program. Efforts were made to edit the dictations, but occasionally words are mistranscribed.

## 2025-02-17 ENCOUNTER — OFFICE VISIT (OUTPATIENT)
Dept: CARDIOLOGY | Facility: CLINIC | Age: 70
End: 2025-02-17
Payer: COMMERCIAL

## 2025-02-17 VITALS
HEART RATE: 90 BPM | SYSTOLIC BLOOD PRESSURE: 114 MMHG | DIASTOLIC BLOOD PRESSURE: 44 MMHG | OXYGEN SATURATION: 97 % | BODY MASS INDEX: 25.04 KG/M2 | HEIGHT: 68 IN | WEIGHT: 165.2 LBS

## 2025-02-17 DIAGNOSIS — I25.810 CORONARY ARTERY DISEASE INVOLVING CORONARY BYPASS GRAFT OF NATIVE HEART WITHOUT ANGINA PECTORIS: Primary | ICD-10-CM

## 2025-02-17 DIAGNOSIS — I10 ESSENTIAL HYPERTENSION: ICD-10-CM

## 2025-02-17 DIAGNOSIS — E78.5 DYSLIPIDEMIA: ICD-10-CM

## 2025-02-17 DIAGNOSIS — I25.5 ISCHEMIC CARDIOMYOPATHY: ICD-10-CM

## 2025-02-17 PROCEDURE — 99214 OFFICE O/P EST MOD 30 MIN: CPT | Performed by: INTERNAL MEDICINE

## 2025-03-06 ENCOUNTER — TELEPHONE (OUTPATIENT)
Dept: CARDIOLOGY | Facility: CLINIC | Age: 70
End: 2025-03-06
Payer: COMMERCIAL

## 2025-03-06 NOTE — TELEPHONE ENCOUNTER
Received fax cardiac clearance request from [place].    Pt scheduled to have Left true vocal cord injection with prolaryn on 3/19/25.    That office is requesting cardiac clearance, and the ok to hold Plavis for 3 days prior.    If clearance is given, letter will be faxed to Farzaneh Sanford, Surgical Coordinator, at [phone].

## 2025-03-11 ENCOUNTER — PATIENT MESSAGE (OUTPATIENT)
Dept: CARDIOLOGY | Facility: CLINIC | Age: 70
End: 2025-03-11
Payer: COMMERCIAL

## 2025-03-11 ENCOUNTER — TELEPHONE (OUTPATIENT)
Dept: CARDIOLOGY | Facility: CLINIC | Age: 70
End: 2025-03-11

## 2025-03-11 NOTE — TELEPHONE ENCOUNTER
Caller: LARRY HASSAN    Relationship: Emergency Contact    Best call back number: [phone]     What is the best time to reach you: ANYTIME    Who are you requesting to speak with (clinical staff, provider,  specific staff member): YOUSUF     What was the call regarding:  PT'S WIFE IS CALLING TO SEE IF YOUSUF CAN SEND OVER CARDIAC CLEARANCE AND MEDICAL RECORDS TO DOT DOCTOR. DOT DOCTOR IS ALSO WANTING THE PT TO GET A STRESS TEST. CAN  SEND AN ORDER TO [facility] IF POSSIBLE FOR THIS STRESS TEST?    Is it okay if the provider responds through MyChart:  YES

## 2025-03-12 NOTE — TELEPHONE ENCOUNTER
Called [address] in [place], [place] and spoke with staff to get clarification, at this point they just need records as they are still working on determination for DOT

## 2025-04-15 ENCOUNTER — APPOINTMENT (OUTPATIENT)
Dept: GENERAL RADIOLOGY | Facility: HOSPITAL | Age: 70
End: 2025-04-15
Payer: COMMERCIAL

## 2025-04-15 ENCOUNTER — HOSPITAL ENCOUNTER (EMERGENCY)
Facility: HOSPITAL | Age: 70
Discharge: HOME | End: 2025-04-15
Attending: EMERGENCY MEDICINE | Admitting: EMERGENCY MEDICINE
Payer: COMMERCIAL

## 2025-04-15 ENCOUNTER — APPOINTMENT (OUTPATIENT)
Dept: CT IMAGING | Facility: HOSPITAL | Age: 70
End: 2025-04-15
Payer: COMMERCIAL

## 2025-04-15 VITALS
SYSTOLIC BLOOD PRESSURE: 133 MMHG | BODY MASS INDEX: 24.55 KG/M2 | HEART RATE: 74 BPM | OXYGEN SATURATION: 97 % | DIASTOLIC BLOOD PRESSURE: 57 MMHG | HEIGHT: 68 IN | RESPIRATION RATE: 16 BRPM | TEMPERATURE: 97.6 F | WEIGHT: 162 LBS

## 2025-04-15 DIAGNOSIS — Z02.89 REFERRED BY HEALTH CARE PROFESSIONAL: ICD-10-CM

## 2025-04-15 DIAGNOSIS — N18.32 STAGE 3B CHRONIC KIDNEY DISEASE: ICD-10-CM

## 2025-04-15 DIAGNOSIS — R55 SYNCOPE, UNSPECIFIED SYNCOPE TYPE: Primary | ICD-10-CM

## 2025-04-15 LAB
ALBUMIN SERPL-MCNC: 4.3 G/DL (ref 3.5–5.2)
ALBUMIN/GLOB SERPL: 1.4 G/DL
ALP SERPL-CCNC: 58 U/L (ref 39–117)
ALT SERPL W P-5'-P-CCNC: 12 U/L (ref 1–41)
ANION GAP SERPL CALCULATED.3IONS-SCNC: 11 MMOL/L (ref 5–15)
AST SERPL-CCNC: 14 U/L (ref 1–40)
BASOPHILS # BLD AUTO: 0.07 10*3/MM3 (ref 0–0.2)
BASOPHILS NFR BLD AUTO: 0.7 % (ref 0–1.5)
BILIRUB SERPL-MCNC: 0.3 MG/DL (ref 0–1.2)
BILIRUB UR QL STRIP: NEGATIVE
BUN SERPL-MCNC: 33 MG/DL (ref 8–23)
BUN/CREAT SERPL: 17 (ref 7–25)
CALCIUM SPEC-SCNC: 9.4 MG/DL (ref 8.6–10.5)
CHLORIDE SERPL-SCNC: 103 MMOL/L (ref 98–107)
CLARITY UR: CLEAR
CO2 SERPL-SCNC: 25 MMOL/L (ref 22–29)
COLOR UR: YELLOW
CREAT SERPL-MCNC: 1.94 MG/DL (ref 0.76–1.27)
DEPRECATED RDW RBC AUTO: 40.8 FL (ref 37–54)
EGFRCR SERPLBLD CKD-EPI 2021: 37.2 ML/MIN/1.73
EOSINOPHIL # BLD AUTO: 0.59 10*3/MM3 (ref 0–0.4)
EOSINOPHIL NFR BLD AUTO: 6.3 % (ref 0.3–6.2)
ERYTHROCYTE [DISTWIDTH] IN BLOOD BY AUTOMATED COUNT: 12.5 % (ref 12.3–15.4)
GEN 5 1HR TROPONIN T REFLEX: 18 NG/L
GLOBULIN UR ELPH-MCNC: 3.1 GM/DL
GLUCOSE SERPL-MCNC: 100 MG/DL (ref 65–99)
GLUCOSE UR STRIP-MCNC: ABNORMAL MG/DL
HCT VFR BLD AUTO: 38.9 % (ref 37.5–51)
HGB BLD-MCNC: 12.6 G/DL (ref 13–17.7)
HGB UR QL STRIP.AUTO: NEGATIVE
HOLD SPECIMEN: NORMAL
IMM GRANULOCYTES # BLD AUTO: 0.04 10*3/MM3 (ref 0–0.05)
IMM GRANULOCYTES NFR BLD AUTO: 0.4 % (ref 0–0.5)
KETONES UR QL STRIP: NEGATIVE
LEUKOCYTE ESTERASE UR QL STRIP.AUTO: NEGATIVE
LYMPHOCYTES # BLD AUTO: 2.33 10*3/MM3 (ref 0.7–3.1)
LYMPHOCYTES NFR BLD AUTO: 24.7 % (ref 19.6–45.3)
MAGNESIUM SERPL-MCNC: 2.5 MG/DL (ref 1.6–2.4)
MCH RBC QN AUTO: 29.4 PG (ref 26.6–33)
MCHC RBC AUTO-ENTMCNC: 32.4 G/DL (ref 31.5–35.7)
MCV RBC AUTO: 90.9 FL (ref 79–97)
MONOCYTES # BLD AUTO: 0.84 10*3/MM3 (ref 0.1–0.9)
MONOCYTES NFR BLD AUTO: 8.9 % (ref 5–12)
NEUTROPHILS NFR BLD AUTO: 5.56 10*3/MM3 (ref 1.7–7)
NEUTROPHILS NFR BLD AUTO: 59 % (ref 42.7–76)
NITRITE UR QL STRIP: NEGATIVE
NRBC BLD AUTO-RTO: 0 /100 WBC (ref 0–0.2)
PH UR STRIP.AUTO: 5.5 [PH] (ref 5–8)
PLATELET # BLD AUTO: 273 10*3/MM3 (ref 140–450)
PMV BLD AUTO: 10.8 FL (ref 6–12)
POTASSIUM SERPL-SCNC: 4.8 MMOL/L (ref 3.5–5.2)
PROT SERPL-MCNC: 7.4 G/DL (ref 6–8.5)
PROT UR QL STRIP: NEGATIVE
QT INTERVAL: 376 MS
QTC INTERVAL: 423 MS
RBC # BLD AUTO: 4.28 10*6/MM3 (ref 4.14–5.8)
SODIUM SERPL-SCNC: 139 MMOL/L (ref 136–145)
SP GR UR STRIP: 1.01 (ref 1–1.03)
TROPONIN T NUMERIC DELTA: -2 NG/L
TROPONIN T SERPL HS-MCNC: 20 NG/L
UROBILINOGEN UR QL STRIP: ABNORMAL
WBC NRBC COR # BLD AUTO: 9.43 10*3/MM3 (ref 3.4–10.8)
WHOLE BLOOD HOLD COAG: NORMAL
WHOLE BLOOD HOLD SPECIMEN: NORMAL

## 2025-04-15 PROCEDURE — 80053 COMPREHEN METABOLIC PANEL: CPT | Performed by: EMERGENCY MEDICINE

## 2025-04-15 PROCEDURE — 81003 URINALYSIS AUTO W/O SCOPE: CPT | Performed by: NURSE PRACTITIONER

## 2025-04-15 PROCEDURE — 70450 CT HEAD/BRAIN W/O DYE: CPT

## 2025-04-15 PROCEDURE — 71045 X-RAY EXAM CHEST 1 VIEW: CPT

## 2025-04-15 PROCEDURE — 93005 ELECTROCARDIOGRAM TRACING: CPT | Performed by: EMERGENCY MEDICINE

## 2025-04-15 PROCEDURE — 84484 ASSAY OF TROPONIN QUANT: CPT | Performed by: EMERGENCY MEDICINE

## 2025-04-15 PROCEDURE — 99285 EMERGENCY DEPT VISIT HI MDM: CPT

## 2025-04-15 PROCEDURE — 71275 CT ANGIOGRAPHY CHEST: CPT

## 2025-04-15 PROCEDURE — 25510000001 IOPAMIDOL PER 1 ML: Performed by: EMERGENCY MEDICINE

## 2025-04-15 PROCEDURE — 83735 ASSAY OF MAGNESIUM: CPT | Performed by: EMERGENCY MEDICINE

## 2025-04-15 PROCEDURE — 36415 COLL VENOUS BLD VENIPUNCTURE: CPT

## 2025-04-15 PROCEDURE — 85025 COMPLETE CBC W/AUTO DIFF WBC: CPT | Performed by: EMERGENCY MEDICINE

## 2025-04-15 PROCEDURE — 25810000003 SODIUM CHLORIDE 0.9 % SOLUTION: Performed by: NURSE PRACTITIONER

## 2025-04-15 RX ORDER — IOPAMIDOL 755 MG/ML
100 INJECTION, SOLUTION INTRAVASCULAR
Status: COMPLETED | OUTPATIENT
Start: 2025-04-15 | End: 2025-04-15

## 2025-04-15 RX ORDER — SODIUM CHLORIDE 0.9 % (FLUSH) 0.9 %
10 SYRINGE (ML) INJECTION AS NEEDED
Status: DISCONTINUED | OUTPATIENT
Start: 2025-04-15 | End: 2025-04-15 | Stop reason: HOSPADM

## 2025-04-15 RX ADMIN — IOPAMIDOL 80 ML: 755 INJECTION, SOLUTION INTRAVENOUS at 15:13

## 2025-04-15 RX ADMIN — SODIUM CHLORIDE 1000 ML: 9 INJECTION, SOLUTION INTRAVENOUS at 12:45

## 2025-04-15 NOTE — ED PROVIDER NOTES
Subjective   History of Present Illness  Pleasant patient presents to the ER with syncopal episode that occurred yesterday.  Was very briefly.  He denies any pain or trauma from that syncope.  He tells me he has had a headache for the last week.  Waxing and waning.  Tells me he was hospitalized around August for aortic valve replacement.  He also has a history of CHF.  COPD.  Coronary artery disease.  Diabetes.  He also had carotid artery surgery as well.  He tells me he is anticoagulated on Plavix.  Dr. Calix is his cardiologist.  He lives in [facility].  He tells me he feels okay except from exertional difficulty breathing.  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  Spoke to his regular doctor was advised to come to the ER for further evaluation.  Currently he tells me he feels just fine.        Review of Systems    Past Medical History:   Diagnosis Date    Abnormal ECG 8/7/24    Aortic valve replaced     Arthritis     CHF (congestive heart failure) 8/9/24    COPD (chronic obstructive pulmonary disease)     Coronary artery disease     Diabetes mellitus     patient placed on jardiance following CABG    Full dentures     GERD (gastroesophageal reflux disease)     Heart attack     august 7, 2024    Heart valve disease August 7th    History of transfusion     august 9 2024 with CABG    Hypertension        No Known Allergies    Past Surgical History:   Procedure Laterality Date    AORTIC VALVE REPAIR/REPLACEMENT  8/9/24    AORTIC VALVE SURGERY  8/82024    ARTERIAL BYPASS SURGERY  8/9/24    CARDIAC CATHETERIZATION N/A 08/07/2024    Procedure: Left Heart Cath;  Surgeon: Logan Calix MD;  Location: [facility] CATH INVASIVE LOCATION;  Service: Cardiovascular;  Laterality: N/A;    CARDIAC VALVE REPLACEMENT  8/9/24    CAROTID ENDARTERECTOMY Right  10/01/2024    Procedure: CAROTID ENDARTERECTOMY WITH EEG RIGHT;  Surgeon: Anderson Pisano MD;  Location:  [facility];  Service: Vascular;  Laterality: Right;    CAROTID ENDARTERECTOMY Left 11/08/2024    Procedure: CAROTID ENDARTERECTOMY WITH EEG LEFT;  Surgeon: Anderson Pisano MD;  Location:  [facility];  Service: Vascular;  Laterality: Left;    CATARACT EXTRACTION Bilateral     CORONARY ARTERY BYPASS GRAFT  8/9/24    CORONARY ARTERY BYPASS GRAFT WITH AORTIC VALVE REPAIR/REPLACEMENT N/A 08/09/2024    Procedure: CORONARY ARTERY BYPASS X 3 WITH INTERNAL MAMMARY ARTERY GRAFT AND AORTIC VALVE REPAIR/REPLACEMENT, SINDY, EVH;  Surgeon: Gage Gibson MD;  Location:  [facility];  Service: Cardiothoracic;  Laterality: N/A;       Family History   Problem Relation Age of Onset    Heart disease Mother     Heart attack Mother     Diabetes Mother     Cancer Father     Lung cancer Father     No Known Problems Sister     No Known Problems Sister     Heart attack Brother     Heart disease Brother     Heart attack Brother     Heart disease Brother        Social History     Socioeconomic History    Marital status:     Number of children: 4   Tobacco Use    Smoking status: Former     Current packs/day: 0.00     Types: Cigarettes     Quit date: 2010     Years since quitting: 15.2     Passive exposure: Never    Smokeless tobacco: Never    Tobacco comments:     Pt states that he quit smoking in 2010   Vaping Use    Vaping status: Former   Substance and Sexual Activity    Alcohol use: Never    Drug use: Never    Sexual activity: Not Currently     Partners: Female           Objective   Physical Exam  Constitutional:       Appearance: He is well-developed.   HENT:      Head: Normocephalic and atraumatic.      Right Ear: External ear normal.      Left Ear: External ear normal.      Nose: Nose normal.   Eyes:      Conjunctiva/sclera: Conjunctivae normal.      Pupils: Pupils are equal, round, and reactive to light.   Cardiovascular:       Rate and Rhythm: Normal rate and regular rhythm.      Heart sounds: Normal heart sounds.   Pulmonary:      Effort: Pulmonary effort is normal.      Breath sounds: Normal breath sounds.   Abdominal:      General: Bowel sounds are normal.      Palpations: Abdomen is soft.   Musculoskeletal:         General: Normal range of motion.      Cervical back: Normal range of motion and neck supple.   Skin:     General: Skin is warm and dry.   Neurological:      Mental Status: He is alert and oriented to person, place, and time.   Psychiatric:         Behavior: Behavior normal.         Judgment: Judgment normal.         Procedures           ED Course  ED Course as of 04/15/25 1620   Tue Apr 15, 2025   1257 Differential diagnosis includes pulmonary embolism.  Pneumonia.  Aneurysm.  Head bleed.  Does have exertional difficulty breathing.  Recent hospitalization was several surgeries.  Will give a fluid bolus.  GFR is greater than 30. [JM]   1358 Awaiting CAT scans [JM]   1438 Awaiting CT of the chest [JM]   1500 CAT scan of the chest interpreted myself with no acute process [JM]   1503 EKG interpreted by myself no acute process [JM]   1606 Had a very long conversation with the patient and his wife at the bedside.  He has been asymptomatic for the duration of ER stay.  He is actually able to walk in the ER without any difficulty. [JM]   1607 He denies any dizziness or near syncope.  His creatinine is little higher than usual today but tells me he has not had much to eat or drink today.  Labs are overall reassuring except for elevated creatinine and a little elevated magnesium.  He did have a liter of fluid here.  He is hungry  Getting something to eat and drink leaves. [JM]   1608 We discussed with shared decision making source [JM]   1608  staying in the hospital versus going home.  At this point he tells me he feels just fine and he would like to go home like that is reasonable.  I think he needs mandatory follow-up with  his nephrologist.  I advised him and his you are to contact them tomorrow for an earlier follow-up about his kidney dysfunction I will also make him a follow-up with her syncope clinic as well.  I did give him strict warning signs symptoms worsening condition.  He will also avoid driving and will have his wife take him home [JM]   1609 They are thankful for care given at [facility] ER.  We discussed the risk benefits of IV contrast for the workup today.  Kidney dysfunction as well as a sense of worsening condition.  So in summary [JM]   1609  the patient is asymptomatic here in the ER he would like to go home think that is low reasonable provided he has close follow-up with nephrology and the syncope clinic here.  However he starts to develop chest pain difficulty breathing more near syncopal episodes he should return to the ER for further evaluation. [JM]   1620 Pt thankful and agreeable with tx poc.  We discussed the signs and symptoms of worsening condition as well as what should bring them back to the emergency department along with appropriate follow-up.  Well aware of the ss of worsening condition.     If advanced imaging was ordered, please see the radiology interpretation of the CT scan MRI or ultrasound for the official reading.   [JM]      ED Course User Index  [JM] Jabari Mcfarland APRN                                           CT Angiogram Chest   Final Result   1.No evidence for pulmonary embolism.   2.No evidence for acute intrathoracic abnormality.            Electronically Signed: Juan Alberto Schwab MD     4/15/2025 3:31 PM EDT     Workstation ID: [id number]      CT Head Without Contrast   Final Result   Impression:   No acute intracranial findings.      Inspissated secretions in the maxillary sinuses.      Electronically Signed: Francisco Mejia MD     4/15/2025 3:18 PM EDT     Workstation ID: [id number]      XR Chest 1 View   Final Result   Impression:   No acute cardiopulmonary findings.         Electronically  Signed: Fam Tran MD     4/15/2025 12:35 PM EDT     Workstation ID: [id number]                    Medical Decision Making  Problems Addressed:  Referred by health care professional: complicated acute illness or injury  Stage 3b chronic kidney disease: complicated acute illness or injury  Syncope, unspecified syncope type: complicated acute illness or injury    Amount and/or Complexity of Data Reviewed  Labs: ordered.  Radiology: ordered.  ECG/medicine tests: ordered.    Risk  Prescription drug management.        Final diagnoses:   Syncope, unspecified syncope type   Stage 3b chronic kidney disease   Referred by health care professional       ED Disposition  ED Disposition       ED Disposition   Discharge    Condition   Stable    Comment   --               [facility] CARDIOLOGY  [address]  [address]  [facility] [id number] [phone]  Schedule an appointment as soon as possible for a visit       [facility]  [address]  [facility] [id number] [phone]  Schedule an appointment as soon as possible for a visit       [facility] EMERGENCY DEPARTMENT  [address] [id number] [phone]    If symptoms worsen    Edgard Romero MD  [address] [place] [id number] [phone]    Schedule an appointment as soon as possible for a visit            Medication List        Changed      furosemide 40 MG tablet  Commonly known as: LASIX  What changed:   how much to take  when to take this  additional instructions                 Jabari Mcfarland, DESEAN  04/15/25 1506     Cooperative/Awake/Alert

## 2025-04-26 LAB
QT INTERVAL: 376 MS
QTC INTERVAL: 423 MS

## 2025-06-26 DIAGNOSIS — I65.23 BILATERAL CAROTID ARTERY STENOSIS: Primary | ICD-10-CM

## 2025-07-09 ENCOUNTER — TELEPHONE (OUTPATIENT)
Dept: CARDIOLOGY | Facility: CLINIC | Age: 70
End: 2025-07-09
Payer: COMMERCIAL

## 2025-07-09 NOTE — TELEPHONE ENCOUNTER
Caller: LARRY HASSAN    Relationship: Emergency Contact    Best call back number: [phone]    What is the best time to reach you: ANYTIME    Who are you requesting to speak with (clinical staff, provider,  specific staff member): ANYONE    What was the call regarding: PATIENTS WIFE CALLING TO INFORM THAT MORE INFORMATION IS NEEDED FOR THE [employer] PH[employer]ICAL. WOULD LIKE TO KNOW IF SHE CAN FAX THAT OVER OR IF NEEDED SHE CAN DROP IT OFF. WANTING TO KNOW WHAT YOUSUF WANTS HER TO DO.

## 2025-07-09 NOTE — TELEPHONE ENCOUNTER
Left VM advising of fax number to send form to and to call back with any other questions, or send a [employer] message with more information.

## 2025-07-23 RX ORDER — ROSUVASTATIN CALCIUM 20 MG/1
20 TABLET, COATED ORAL
Qty: 30 TABLET | Refills: 10 | Status: SHIPPED | OUTPATIENT
Start: 2025-07-23

## 2025-07-23 RX ORDER — CLOPIDOGREL BISULFATE 75 MG/1
75 TABLET ORAL DAILY
Qty: 30 TABLET | Refills: 11 | OUTPATIENT
Start: 2025-07-23

## 2025-08-01 ENCOUNTER — HOSPITAL ENCOUNTER (OUTPATIENT)
Dept: CARDIOLOGY | Facility: HOSPITAL | Age: 70
Discharge: HOME | End: 2025-08-01
Admitting: NURSE PRACTITIONER
Payer: COMMERCIAL

## 2025-08-01 VITALS — BODY MASS INDEX: 24.56 KG/M2 | WEIGHT: 162.04 LBS | HEIGHT: 68 IN

## 2025-08-01 DIAGNOSIS — I65.23 BILATERAL CAROTID ARTERY STENOSIS: ICD-10-CM

## 2025-08-01 LAB
BH CV XLRA MEAS LEFT DIST CCA EDV: 10.8 CM/SEC
BH CV XLRA MEAS LEFT DIST CCA PSV: 86.1 CM/SEC
BH CV XLRA MEAS LEFT DIST ICA EDV: 24 CM/SEC
BH CV XLRA MEAS LEFT DIST ICA PSV: 103.9 CM/SEC
BH CV XLRA MEAS LEFT ICA/CCA RATIO: 1.47
BH CV XLRA MEAS LEFT MID CCA EDV: 11.3 CM/SEC
BH CV XLRA MEAS LEFT MID CCA PSV: 70.5 CM/SEC
BH CV XLRA MEAS LEFT MID ICA EDV: 25.3 CM/SEC
BH CV XLRA MEAS LEFT MID ICA PSV: 126.6 CM/SEC
BH CV XLRA MEAS LEFT PROX CCA EDV: 8.4 CM/SEC
BH CV XLRA MEAS LEFT PROX CCA PSV: 55.8 CM/SEC
BH CV XLRA MEAS LEFT PROX ECA EDV: 3.6 CM/SEC
BH CV XLRA MEAS LEFT PROX ECA PSV: 123.2 CM/SEC
BH CV XLRA MEAS LEFT PROX ICA EDV: 21.4 CM/SEC
BH CV XLRA MEAS LEFT PROX ICA PSV: 96.4 CM/SEC
BH CV XLRA MEAS LEFT PROX SCLA PSV: 293.8 CM/SEC
BH CV XLRA MEAS LEFT VERTEBRAL A EDV: 13.6 CM/SEC
BH CV XLRA MEAS LEFT VERTEBRAL A PSV: 55.8 CM/SEC
BH CV XLRA MEAS RIGHT DIST CCA EDV: 11.9 CM/SEC
BH CV XLRA MEAS RIGHT DIST CCA PSV: 62.3 CM/SEC
BH CV XLRA MEAS RIGHT DIST ICA EDV: 21.4 CM/SEC
BH CV XLRA MEAS RIGHT DIST ICA PSV: 107.1 CM/SEC
BH CV XLRA MEAS RIGHT ICA/CCA RATIO: 1.4
BH CV XLRA MEAS RIGHT MID CCA EDV: 13.5 CM/SEC
BH CV XLRA MEAS RIGHT MID CCA PSV: 70.6 CM/SEC
BH CV XLRA MEAS RIGHT MID ICA EDV: 24.3 CM/SEC
BH CV XLRA MEAS RIGHT MID ICA PSV: 114.2 CM/SEC
BH CV XLRA MEAS RIGHT PROX CCA EDV: 8.7 CM/SEC
BH CV XLRA MEAS RIGHT PROX CCA PSV: 81.7 CM/SEC
BH CV XLRA MEAS RIGHT PROX ECA EDV: 10 CM/SEC
BH CV XLRA MEAS RIGHT PROX ECA PSV: 317 CM/SEC
BH CV XLRA MEAS RIGHT PROX ICA EDV: 26.4 CM/SEC
BH CV XLRA MEAS RIGHT PROX ICA PSV: 98.5 CM/SEC
BH CV XLRA MEAS RIGHT PROX SCLA PSV: 196.4 CM/SEC
BH CV XLRA MEAS RIGHT VERTEBRAL A EDV: 17.9 CM/SEC
BH CV XLRA MEAS RIGHT VERTEBRAL A PSV: 114.2 CM/SEC
LEFT ARM BP: NORMAL MMHG
RIGHT ARM BP: NORMAL MMHG

## 2025-08-01 PROCEDURE — 93880 EXTRACRANIAL BILAT STUDY: CPT

## 2025-08-07 ENCOUNTER — OFFICE VISIT (OUTPATIENT)
Age: 70
End: 2025-08-07
Payer: COMMERCIAL

## 2025-08-07 VITALS
SYSTOLIC BLOOD PRESSURE: 130 MMHG | HEART RATE: 67 BPM | DIASTOLIC BLOOD PRESSURE: 70 MMHG | BODY MASS INDEX: 26.07 KG/M2 | HEIGHT: 69 IN | WEIGHT: 176 LBS | OXYGEN SATURATION: 97 %

## 2025-08-07 DIAGNOSIS — I25.5 ISCHEMIC CARDIOMYOPATHY: Chronic | ICD-10-CM

## 2025-08-07 DIAGNOSIS — I35.0 SEVERE AORTIC STENOSIS: Chronic | ICD-10-CM

## 2025-08-07 DIAGNOSIS — J44.9 COPD MIXED TYPE: Primary | ICD-10-CM

## 2025-08-07 DIAGNOSIS — Z87.891 FORMER SMOKER: Chronic | ICD-10-CM

## 2025-08-07 RX ORDER — EZETIMIBE 10 MG/1
TABLET ORAL
COMMUNITY
Start: 2025-07-17

## 2025-08-07 RX ORDER — PREDNISONE 10 MG/1
TABLET ORAL
Qty: 40 TABLET | Refills: 0 | Status: SHIPPED | OUTPATIENT
Start: 2025-08-07

## 2025-08-07 RX ORDER — ALBUTEROL SULFATE 90 UG/1
4 INHALANT RESPIRATORY (INHALATION) ONCE
Status: COMPLETED | OUTPATIENT
Start: 2025-08-07 | End: 2025-08-07

## 2025-08-07 RX ADMIN — ALBUTEROL SULFATE 4 PUFF: 90 INHALANT RESPIRATORY (INHALATION) at 12:29

## 2025-08-15 PROBLEM — I65.22 CAROTID STENOSIS, ASYMPTOMATIC, LEFT: Status: RESOLVED | Noted: 2024-11-08 | Resolved: 2025-08-15

## 2025-08-18 ENCOUNTER — OFFICE VISIT (OUTPATIENT)
Dept: CARDIOLOGY | Facility: CLINIC | Age: 70
End: 2025-08-18
Payer: COMMERCIAL

## 2025-08-18 ENCOUNTER — OFFICE VISIT (OUTPATIENT)
Dept: CARDIAC SURGERY | Facility: CLINIC | Age: 70
End: 2025-08-18
Payer: COMMERCIAL

## 2025-08-18 ENCOUNTER — PATIENT ROUNDING (BHMG ONLY) (OUTPATIENT)
Dept: CARDIOLOGY | Facility: CLINIC | Age: 70
End: 2025-08-18
Payer: COMMERCIAL

## 2025-08-18 VITALS
WEIGHT: 181.6 LBS | BODY MASS INDEX: 26.9 KG/M2 | HEIGHT: 69 IN | TEMPERATURE: 96.8 F | HEART RATE: 70 BPM | SYSTOLIC BLOOD PRESSURE: 154 MMHG | OXYGEN SATURATION: 97 % | DIASTOLIC BLOOD PRESSURE: 70 MMHG

## 2025-08-18 VITALS
DIASTOLIC BLOOD PRESSURE: 52 MMHG | HEART RATE: 65 BPM | OXYGEN SATURATION: 95 % | BODY MASS INDEX: 26.81 KG/M2 | SYSTOLIC BLOOD PRESSURE: 148 MMHG | HEIGHT: 69 IN

## 2025-08-18 DIAGNOSIS — I65.23 BILATERAL CAROTID ARTERY STENOSIS: Primary | ICD-10-CM

## 2025-08-18 DIAGNOSIS — I25.810 CORONARY ARTERY DISEASE INVOLVING CORONARY BYPASS GRAFT OF NATIVE HEART WITHOUT ANGINA PECTORIS: Primary | ICD-10-CM

## 2025-08-18 DIAGNOSIS — I10 ESSENTIAL HYPERTENSION: ICD-10-CM

## 2025-08-18 DIAGNOSIS — I25.5 ISCHEMIC CARDIOMYOPATHY: ICD-10-CM

## 2025-08-18 DIAGNOSIS — E78.5 DYSLIPIDEMIA: ICD-10-CM

## 2025-08-18 PROCEDURE — 99214 OFFICE O/P EST MOD 30 MIN: CPT | Performed by: INTERNAL MEDICINE

## 2025-08-18 PROCEDURE — 99213 OFFICE O/P EST LOW 20 MIN: CPT | Performed by: NURSE PRACTITIONER

## 2025-08-18 RX ORDER — VALSARTAN 160 MG/1
160 TABLET ORAL DAILY
COMMUNITY

## 2025-08-20 ENCOUNTER — TELEPHONE (OUTPATIENT)
Dept: PULMONOLOGY | Facility: CLINIC | Age: 70
End: 2025-08-20
Payer: COMMERCIAL

## 2025-08-20 ENCOUNTER — OFFICE VISIT (OUTPATIENT)
Age: 70
End: 2025-08-20
Payer: COMMERCIAL

## 2025-08-20 VITALS
TEMPERATURE: 98.2 F | HEART RATE: 72 BPM | OXYGEN SATURATION: 94 % | DIASTOLIC BLOOD PRESSURE: 66 MMHG | SYSTOLIC BLOOD PRESSURE: 130 MMHG | HEIGHT: 69 IN | BODY MASS INDEX: 27.12 KG/M2 | WEIGHT: 183.1 LBS

## 2025-08-20 DIAGNOSIS — K21.9 GASTROESOPHAGEAL REFLUX DISEASE WITHOUT ESOPHAGITIS: Chronic | ICD-10-CM

## 2025-08-20 DIAGNOSIS — J44.9 COPD MIXED TYPE: Primary | ICD-10-CM

## 2025-08-20 DIAGNOSIS — Z87.891 PERSONAL HISTORY OF SMOKING: ICD-10-CM

## 2025-08-20 RX ORDER — ALBUTEROL SULFATE 90 UG/1
4 INHALANT RESPIRATORY (INHALATION) ONCE
Status: COMPLETED | OUTPATIENT
Start: 2025-08-20 | End: 2025-08-20

## 2025-08-20 RX ADMIN — ALBUTEROL SULFATE 4 PUFF: 90 INHALANT RESPIRATORY (INHALATION) at 12:13

## 2025-08-21 ENCOUNTER — TELEPHONE (OUTPATIENT)
Age: 70
End: 2025-08-21

## 2025-08-21 DIAGNOSIS — J44.9 COPD MIXED TYPE: Primary | ICD-10-CM

## 2025-08-22 RX ORDER — FLUTICASONE FUROATE, UMECLIDINIUM BROMIDE AND VILANTEROL TRIFENATATE 200; 62.5; 25 UG/1; UG/1; UG/1
1 POWDER RESPIRATORY (INHALATION)
Qty: 60 EACH | Refills: 11 | Status: SHIPPED | OUTPATIENT
Start: 2025-08-22

## 2025-08-26 ENCOUNTER — TELEPHONE (OUTPATIENT)
Dept: PULMONOLOGY | Facility: CLINIC | Age: 70
End: 2025-08-26
Payer: COMMERCIAL

## (undated) DEVICE — COPILOT BLEEDBACK CONTROL VALVE: Brand: COPILOT

## (undated) DEVICE — SUT PROLN 6/0 C1 D/A 30IN 8706H

## (undated) DEVICE — JACKSON-PRATT 100CC BULB RESERVOIR: Brand: CARDINAL HEALTH

## (undated) DEVICE — CATHETER,URETHRAL,REDRUBBER,STRL,18FR: Brand: MEDLINE

## (undated) DEVICE — ELECTRD BLD EZ CLN STD 2.5IN

## (undated) DEVICE — SUT SILK 4/0 RB1CR8 18IN C054D

## (undated) DEVICE — TBG SXN RIGD MINI/SUCKER 9F 4.75IN

## (undated) DEVICE — SUT PROLN 7/0 BV1 D/A 24IN 8702H

## (undated) DEVICE — PK VASC 10

## (undated) DEVICE — CLTH CLENS READYCLEANSE PERI CARE PK/5

## (undated) DEVICE — SUT SILK 2/0 TIES 18IN A185H

## (undated) DEVICE — CANN VESL DLP 1WY BLNT/TP 3MM

## (undated) DEVICE — TBG SXN PERFUS W TP

## (undated) DEVICE — CATH DIAG EXPO .056 FL3.5 6F 100CM

## (undated) DEVICE — GLV SURG BIOGEL LTX PF 8

## (undated) DEVICE — SUCTION CANISTER 2500CC: Brand: DEROYAL

## (undated) DEVICE — SUT PROLN 5/0 C1 D/A 36IN 8720H

## (undated) DEVICE — TRAP FLD MINIVAC MEGADYNE 100ML

## (undated) DEVICE — SUT SILK 4/0 TIES 18IN A183H

## (undated) DEVICE — SUT PROLN 4/0 RB1 D/A 36IN 8557H

## (undated) DEVICE — GLV SURG BIOGEL LTX PF 7 1/2

## (undated) DEVICE — CATH DIAG EXPO M/ PK 6FR FL4/FR4 PIG 3PK

## (undated) DEVICE — DRAIN JACKSON PRATT ROUND 15FR: Brand: CARDINAL HEALTH

## (undated) DEVICE — IRRIGATOR BULB ASEPTO 60CC STRL

## (undated) DEVICE — 1 ML TUBERCULIN SYRINGE REGULAR TIP: Brand: MONOJECT

## (undated) DEVICE — Device: Brand: JELCO

## (undated) DEVICE — ADAPT/Y PERFUS DLP FML/LUER COLR/CODE/CLMP 8.9AND25.4CM

## (undated) DEVICE — 3M™ IOBAN™ 2 ANTIMICROBIAL INCISE DRAPE 6650EZ: Brand: IOBAN™ 2

## (undated) DEVICE — SYS VASOVIEW2 HEMOPRO ENDOSCOPIC HARVST VESL

## (undated) DEVICE — PK ATS CUST W CARDIOTOMY RESEVOIR

## (undated) DEVICE — ANTIBACTERIAL UNDYED BRAIDED (POLYGLACTIN 910), SYNTHETIC ABSORBABLE SUTURE: Brand: COATED VICRYL

## (undated) DEVICE — PENCL SMOKE/EVAC MEGADYNE TELESCP 10FT

## (undated) DEVICE — Device: Brand: PERFECTCUT ROTATING AORTIC PUNCH

## (undated) DEVICE — ST INF PRI SMRTSTE 20DRP 2VLV 24ML 117

## (undated) DEVICE — DEV INFL MONARCH 25W

## (undated) DEVICE — GLIDESHEATH SLENDER STAINLESS STEEL KIT: Brand: GLIDESHEATH SLENDER

## (undated) DEVICE — BLD SCLPL BEAVR MINI STR 2BVL 180D LF

## (undated) DEVICE — TUBING, SUCTION, 1/4" X 10', STRAIGHT: Brand: MEDLINE

## (undated) DEVICE — SUT SILK 2 SUTUPAK TIE 60IN SA8H 2STRAND

## (undated) DEVICE — TB SXN DLP RIGD MACROSUCKER 6F 3IN

## (undated) DEVICE — SUT PDS 1 CTX 36IN VIO PDP371T

## (undated) DEVICE — PENCL ROCKRSWCH MEGADYNE W/HOLSTR 10FT SS

## (undated) DEVICE — OASIS DRAIN, SINGLE, INLINE & ATS COMPATIBLE: Brand: OASIS

## (undated) DEVICE — Device

## (undated) DEVICE — ORGANIZER SUT GABBAY FRATER GFS10A PK/3

## (undated) DEVICE — ST EXT IV SMRTSTE 2VLV FIX M LL 6ML 41

## (undated) DEVICE — PK CATH CARD 10

## (undated) DEVICE — PATIENT RETURN ELECTRODE, SINGLE-USE, CONTACT QUALITY MONITORING, ADULT, WITH 9FT CORD, FOR PATIENTS WEIGING OVER 33LBS. (15KG): Brand: MEGADYNE

## (undated) DEVICE — SUT PROLN 7/0 CV BV1 24IN 8304H BX/36

## (undated) DEVICE — PK HEART OPN 10

## (undated) DEVICE — SPNG GZ WOVN 4X4IN 12PLY 10/BX STRL

## (undated) DEVICE — TR BAND RADIAL ARTERY COMPRESSION DEVICE: Brand: TR BAND

## (undated) DEVICE — DECANTER BAG 9": Brand: MEDLINE INDUSTRIES, INC.

## (undated) DEVICE — SENSR CERBRL O2 PK/2

## (undated) DEVICE — SYR CONTRL LUERLOK 10CC

## (undated) DEVICE — CVR PROB ULTRASND/TRANSD W/GEL 18X120CM STRL

## (undated) DEVICE — SUT SILK 0/0 CT2 18IN C027D

## (undated) DEVICE — CANN AORT ROOT DLP VNT 14G 7F

## (undated) DEVICE — SUT SILK 2/0 PS 18IN 1588H

## (undated) DEVICE — PAD,ARMBOARD,CONV,FOAM,2X8X20",12PR/CS: Brand: MEDLINE

## (undated) DEVICE — SUT PROLN SURGILENE 5.0 24IN BLU 9702H

## (undated) DEVICE — SUT SILK 3/0 TIES 18IN A184H

## (undated) DEVICE — EZ GLIDE AORTIC CANNULA: Brand: EDWARDS LIFESCIENCES EZ GLIDE AORTIC CANNULA

## (undated) DEVICE — MODEL BT2000 P/N 700287-012KIT CONTENTS: MANIFOLD WITH SALINE AND CONTRAST PORTS, SALINE TUBING WITH SPIKE AND HAND SYRINGE, TRANSDUCER: Brand: BT2000 AUTOMATED MANIFOLD KIT

## (undated) DEVICE — SUT PROLN 3/0 8832H

## (undated) DEVICE — NDL PERC 1PRT THNWALL W/BASEPLT 18G 7CM

## (undated) DEVICE — ADULT, W/LG. BACK PAD, RADIOTRANSPARENT ELEMENT AND LEAD WIRE COMPATIBLE W/: Brand: DEFIBRILLATION ELECTRODES

## (undated) DEVICE — MODEL AT P65, P/N 701554-001KIT CONTENTS: HAND CONTROLLER, 3-WAY HIGH-PRESSURE STOPCOCK WITH ROTATING END AND PREMIUM HIGH-PRESSURE TUBING: Brand: ANGIOTOUCH® KIT

## (undated) DEVICE — BLOOD TRANSFUSION FILTER: Brand: HAEMONETICS

## (undated) DEVICE — PCH INST SURG INVISISHIELD 2PCKT

## (undated) DEVICE — LEVEL SENSORS PADS ARE USED TO ATTACH THE LEVEL SENSORS TO A HARD SHELL RESERVOIR. INCLUDES COUPLING GEL.: Brand: TERUMO® ADVANCED PERFUSION SYSTEM 1

## (undated) DEVICE — CATHETER,URETHRAL,REDRUBBER,STERILE,22FR: Brand: MEDLINE

## (undated) DEVICE — 12 FOOT DISPOSABLE EXTENSION CABLE WITH SAFE CONNECT / SCREW-DOWN

## (undated) DEVICE — FLTR RESERV PERFUS INTERSEPT 02 STRL

## (undated) DEVICE — STERILE PVP: Brand: MEDLINE INDUSTRIES, INC.

## (undated) DEVICE — CONNECT Y INTERSEPT W/LL 3/8 X 3/8 X 3/8IN

## (undated) DEVICE — HYPODERMIC SAFETY NEEDLE: Brand: MONOJECT

## (undated) DEVICE — AVID DUAL STAGE VENOUS DRAINAGE CANNULA: Brand: AVID DUAL STAGE VENOUS DRAINAGE CANNULA

## (undated) DEVICE — SUT PROLN 4/0 SH D/A 36IN 8521H

## (undated) DEVICE — 3M™ MEDIPORE™ H SOFT CLOTH SURGICAL TAPE, 2863, 3 IN X 10 YD, 12/CASE: Brand: 3M™ MEDIPORE™

## (undated) DEVICE — PK PERFUS CUST W/CARDIOPLEGIA

## (undated) DEVICE — GW PERIPH GUIDERIGHT STD/EXCHNG/J/TIP SS 0.035IN 5X260CM

## (undated) DEVICE — TOWEL,OR,DSP,ST,BLUE,STD,4/PK,20PK/CS: Brand: MEDLINE

## (undated) DEVICE — AVANTI + 4F STD W/GW: Brand: AVANTI

## (undated) DEVICE — BG BLD SYS